# Patient Record
Sex: FEMALE | Race: WHITE | NOT HISPANIC OR LATINO | Employment: OTHER | URBAN - METROPOLITAN AREA
[De-identification: names, ages, dates, MRNs, and addresses within clinical notes are randomized per-mention and may not be internally consistent; named-entity substitution may affect disease eponyms.]

---

## 2017-01-04 DIAGNOSIS — E78.5 HYPERLIPIDEMIA WITH TARGET LDL LESS THAN 130: Primary | ICD-10-CM

## 2017-01-04 RX ORDER — SIMVASTATIN 10 MG
10 TABLET ORAL AT BEDTIME
Qty: 90 TABLET | Refills: 3 | Status: SHIPPED | OUTPATIENT
Start: 2017-01-04 | End: 2017-12-19

## 2017-01-04 NOTE — TELEPHONE ENCOUNTER
Last lipids over 1 year ago.  Naima refill #30 given last month and letter mailed to patient.    No appointment pending at this time.  Routing to provider to advise.    Alexa Crawford RN

## 2017-01-10 DIAGNOSIS — I10 ESSENTIAL HYPERTENSION WITH GOAL BLOOD PRESSURE LESS THAN 140/90: Primary | ICD-10-CM

## 2017-01-10 RX ORDER — LOSARTAN POTASSIUM AND HYDROCHLOROTHIAZIDE 25; 100 MG/1; MG/1
1 TABLET ORAL DAILY
Qty: 30 TABLET | Refills: 0 | Status: SHIPPED | OUTPATIENT
Start: 2017-01-10 | End: 2017-01-24

## 2017-01-10 NOTE — Clinical Note
[facility]                                             [address] [place]  [address]    January 10, 2017    Lindsay Yuen  [address]  [facility] [address]    Dear Lindsay,       We recently received a refill request for losartan-hydrochlorothiazide .  We have refilled this for a one time 30 day supply only because you are due for a:    Blood pressure office visit      Please call at your earliest convenience so that there will not be a delay with your future refills.          Thank you,   Your [facility] Care Team/jannet  [phone]

## 2017-01-10 NOTE — TELEPHONE ENCOUNTER
Rx refilled per [place] RN refill protocol, #30 only.    TC, patient overdue for:  OV for BP    Alexa Crawford RN

## 2017-01-10 NOTE — TELEPHONE ENCOUNTER
LOSARTAN POTASSIUM-HCTZ      Last Written Prescription Date: 10-9-16  Last Fill Quantity: 90, # refills: 1  Last Office Visit with G, [facility] or [facility] prescribing provider: 6-21-16       POTASSIUM   Date Value Ref Range Status   12/28/2016 3.9 3.4 - 5.3 mmol/L Final     CREATININE   Date Value Ref Range Status   12/28/2016 0.58 0.52 - 1.04 mg/dL Final     BP Readings from Last 3 Encounters:   06/21/16 139/72   02/25/16 132/65   09/30/15 136/84

## 2017-01-18 NOTE — PROGRESS NOTES
SUBJECTIVE:                                                    Lindsay Yuen is a 61 year old female who presents to clinic today for the following health issues:      Hyperlipidemia Follow-Up      Rate your low fat/cholesterol diet?: good    Taking statin?  Yes, no muscle aches from statin    Other lipid medications/supplements?:  none     Hypertension Follow-up      Outpatient blood pressures are being checked at work.  Results are 130/70's.    Low Salt Diet: no added salt  Hypertension ROS: taking medications as instructed, no medication side effects noted, no TIA's, no chest pain on exertion, no dyspnea on exertion, no swelling of ankles.  No headache or visual changes.        Amount of exercise or physical activity: 5 days a work     Problems taking medications regularly: No    Medication side effects: none    Diet: low salt and low fat/cholesterol    Pt has stopped eating out since 1 Jan 2017 and has already lost 5 pounds.  Starting to be more active as well.  Spouse is joining her in her efforts.  Daughter getting  this spring in [place] - so more motivation to lose wt.      Patient Active Problem List   Diagnosis     Hyperlipidemia with target LDL less than 130     Cervical polyp     Hypertension goal BP (blood pressure) < 150/90     Advanced directives, counseling/discussion     Morbid obesity due to excess calories (H)     Past Surgical History   Procedure Laterality Date     Cholecystectomy, laporoscopic               Social History   Substance Use Topics     Smoking status: Never Smoker      Smokeless tobacco: Never Used      Comment: no smokers in the [facility]     Alcohol Use: Yes      Comment: occasional     Family History   Problem Relation Age of Onset     DIABETES Paternal Grandfather      adult     Breast Cancer Sister      CANCER Sister      HEART DISEASE Maternal Aunt      Breast Cancer Mother      Hypertension Mother      Lipids Mother      CANCER Father      kidney cancer     CANCER  Sister      pancreatic         Current Outpatient Prescriptions   Medication Sig Dispense Refill     losartan-hydrochlorothiazide (HYZAAR) 100-25 MG per tablet Take 1 tablet by mouth daily 90 tablet 1     simvastatin (ZOCOR) 10 MG tablet Take 1 tablet (10 mg) by mouth At Bedtime 90 tablet 3     aspirin 81 MG tablet Take 1 tablet by mouth daily.       FOLIC ACID 1 MG OR TABS ONE DAILY       CALCIUM 600 + D 600-200 MG-UNIT OR TABS 2 pill a day  3     CENTRUM SILVER OR TABS ONE DAILY 0 0     VITAMIN E 400 UNIT OR CAPS ONE CAPSULE DAILY       [DISCONTINUED] losartan-hydrochlorothiazide (HYZAAR) 100-25 MG per tablet Take 1 tablet by mouth daily 30 tablet 0     BP Readings from Last 3 Encounters:   01/24/17 138/68   06/21/16 139/72   02/25/16 132/65    Wt Readings from Last 3 Encounters:   01/24/17 220 lb (99.791 kg)   06/21/16 218 lb (98.884 kg)   02/25/16 219 lb (99.338 kg)                  Labs reviewed in EPIC  Problem list, Medication list, Allergies, and Medical/Social/Surgical histories reviewed in [facility] and updated as appropriate.    ROS:  Constitutional, HEENT, cardiovascular, pulmonary, gi and gu systems are negative, except as otherwise noted.    OBJECTIVE:                                                    /68 mmHg  Pulse 79  Temp(Src) 98.2  F (36.8  C) (Oral)  Wt 220 lb (99.791 kg)  SpO2 97%  Body mass index is 37.74 kg/(m^2).  GENERAL: healthy, alert and no distress  EYES: Eyes grossly normal to inspection, PERRL and conjunctivae and sclerae normal  NECK: no adenopathy, no asymmetry, masses, or scars and thyroid normal to palpation  RESP: lungs clear to auscultation - no rales, rhonchi or wheezes  CV: regular rate and rhythm, normal S1 S2, no S3 or S4, no murmur, click or rub, no peripheral edema and peripheral pulses strong  MS: no gross musculoskeletal defects noted, no edema  SKIN: no suspicious lesions or rashes  PSYCH: mentation appears normal, affect normal/bright    Diagnostic Test  Results:  none      ASSESSMENT/PLAN:                                                    (I10) Essential hypertension with goal blood pressure less than 140/90  (primary encounter diagnosis)  Comment: to goal  Plan: losartan-hydrochlorothiazide (HYZAAR) 100-25 MG        per tablet         Refill x 6 months f/u 6 months for AFEand labs     (E66.01) Morbid obesity due to excess calories (H)  Comment: losing weight with changes in nutrition and activity  Plan: continue efforts, encouraged pt to keep up the good work    (Z71.89) Advanced directives, counseling/discussion  Comment: due  Plan: info given    See Patient Instructions    Colleen Marroquin MD  [facility]

## 2017-01-24 ENCOUNTER — OFFICE VISIT (OUTPATIENT)
Dept: FAMILY MEDICINE | Facility: CLINIC | Age: 62
End: 2017-01-24
Payer: COMMERCIAL

## 2017-01-24 VITALS
WEIGHT: 220 LBS | HEART RATE: 79 BPM | DIASTOLIC BLOOD PRESSURE: 68 MMHG | OXYGEN SATURATION: 97 % | TEMPERATURE: 98.2 F | BODY MASS INDEX: 37.74 KG/M2 | SYSTOLIC BLOOD PRESSURE: 138 MMHG

## 2017-01-24 DIAGNOSIS — E66.01 MORBID OBESITY DUE TO EXCESS CALORIES (H): ICD-10-CM

## 2017-01-24 DIAGNOSIS — I10 ESSENTIAL HYPERTENSION WITH GOAL BLOOD PRESSURE LESS THAN 140/90: Primary | ICD-10-CM

## 2017-01-24 DIAGNOSIS — Z71.89 ADVANCED DIRECTIVES, COUNSELING/DISCUSSION: ICD-10-CM

## 2017-01-24 PROCEDURE — 99213 OFFICE O/P EST LOW 20 MIN: CPT | Performed by: FAMILY MEDICINE

## 2017-01-24 RX ORDER — LOSARTAN POTASSIUM AND HYDROCHLOROTHIAZIDE 25; 100 MG/1; MG/1
1 TABLET ORAL DAILY
Qty: 90 TABLET | Refills: 1 | Status: SHIPPED | OUTPATIENT
Start: 2017-01-24 | End: 2017-07-28

## 2017-01-24 NOTE — NURSING NOTE
"Chief Complaint   Patient presents with     Hypertension     Lipids       Initial /68 mmHg  Pulse 79  Temp(Src) 98.2  F (36.8  C) (Oral)  Wt 220 lb (99.791 kg)  SpO2 97% Estimated body mass index is 37.74 kg/(m^2) as calculated from the following:    Height as of 6/21/16: 5' 4\" (1.626 m).    Weight as of this encounter: 220 lb (99.791 kg).  BP completed using cuff size: nahum Escobar CMA      "

## 2017-01-24 NOTE — MR AVS SNAPSHOT
"              After Visit Summary   1/24/2017    Lindsay Yuen    MRN: [id number]           Patient Information     Date Of Birth          1955        Visit Information        Provider Department      1/24/2017 10:15 AM Colleen Marroquin MD [facility]        Today's Diagnoses     Essential hypertension with goal blood pressure less than 140/90    -  1     Advanced directives, counseling/discussion           Care Instructions    A Team member will come into the exam room to help you schedule a:    Laboratory appointment  (non-fasting) in 6 month(s) - this is been scheduled for ___-___-___ at ____ am/pm   Follow up visit with Colleen Marroquin MD for physical in 6 month(s) - this is been scheduled for ___-___-___ at ____ am/pm     ----------------------------------------------------------------------------------------------------------------------------------------     ----------------------------------------------------------------------------------------------------------------------------------------    If you have problems after leaving the clinic please call [facility] at [phone] or contact us via \"Image Socket\" using the care team option      [facility]    If you have any questions regarding to your visit please contact:       Team Saints:   Clinic Hours Telephone Number   Dr. Jill Robinson Dr. William Sypura Kristen Kehr, [place] 7am-7pm  Monday - Thursday   7am-5pm  Fridays  [phone] fax    Denise/RN     After Hours Nurse Advise and Appts.   [facility] Nurse Advisors: [phone]  Matti On Call: to make appointments anytime - [phone]    Urgent Care -   [place] 12pm-8pm Monday-Friday  9am-5pm Saturday-Sunday    9am-5pm Saturday-Sunday [phone] - [place]     [phone] - [place]       After hours, weekend or if you need to make an appointment with your primary provider please call [phone].     If you need a " "medication refill please contact your pharmacy.   Please allow 3 business days for your refills to be completed.    Use [employer] (secure email communication and access to your chart) to send your primary care provider a message or make an appointment. Ask someone on your Team how to sign up for [employer].  [employer] Call now has an chiquita for your smart phone.         Follow-ups after your visit        Who to contact     If you have questions or need follow up information about today's clinic visit or your schedule please contact [facility] directly at [phone].  Normal or non-critical lab and imaging results will be communicated to you by MyChart, letter or phone within 4 business days after the clinic has received the results. If you do not hear from us within 7 days, please contact the clinic through [employer] Safety[employer] or phone. If you have a critical or abnormal lab result, we will notify you by phone as soon as possible.  Submit refill requests through [employer] Call or call your pharmacy and they will forward the refill request to us. Please allow 3 business days for your refill to be completed.          Additional Information About Your Visit        StreamLine Call Information     Stream[employer] Call lets you send messages to your doctor, view your test results, renew your prescriptions, schedule appointments and more. To sign up, go to [email or web address] . Click on \"Log in\" on the left side of the screen, which will take you to the Welcome page. Then click on \"Sign up Now\" on the right side of the page.     You will be asked to enter the access code listed below, as well as some personal information. Please follow the directions to create your username and password.     Your access code is: [email or web address]  Expires: 2017 10:31 AM     Your access code will  in 90 days. If you need help or a new code, please call your [facility] or [phone].        Care EveryWhere ID     This is your Care EveryWhere ID. This could be " used by other organizations to access your [facility] medical records  [id number]        Your Vitals Were     Pulse Temperature Pulse Oximetry             79 98.2  F (36.8  C) (Oral) 97%          Blood Pressure from Last 3 Encounters:   01/24/17 138/68   06/21/16 139/72   02/25/16 132/65    Weight from Last 3 Encounters:   01/24/17 220 lb (99.791 kg)   06/21/16 218 lb (98.884 kg)   02/25/16 219 lb (99.338 kg)              Today, you had the following     No orders found for display         Where to get your medicines      These medications were sent to [id number] - [place], [place] - [address]  [address] [place] [address]     Phone:  [phone]    - losartan-hydrochlorothiazide 100-25 MG per tablet       Primary Care Provider Office Phone # Fax #    Colleen Marroquin [phone]       [facility] [address] [address] [address]        Thank you!     Thank you for choosing [facility]  for your care. Our goal is always to provide you with excellent care. Hearing back from our patients is one way we can continue to improve our services. Please take a few minutes to complete the written survey that you may receive in the mail after your visit with us. Thank you!             Your Updated Medication List - Protect others around you: Learn how to safely use, store and throw away your medicines at www.disposemymeds.org.          This list is accurate as of: 1/24/17 10:31 AM.  Always use your most recent med list.                   Brand Name Dispense Instructions for use    aspirin 81 MG tablet      Take 1 tablet by mouth daily.       CALCIUM 600 + D 600-200 MG-UNIT Tabs          2 pill a day       CENTRUM SILVER per tablet     0    ONE DAILY       folic acid 1 MG tablet    FOLVITE         ONE DAILY       losartan-hydrochlorothiazide 100-25 MG per tablet    HYZAAR    90 tablet    Take 1 tablet by mouth daily       simvastatin 10 MG  tablet    ZOCOR    90 tablet    Take 1 tablet (10 mg) by mouth At Bedtime       vitamin E 400 UNIT capsule          ONE CAPSULE DAILY

## 2017-01-24 NOTE — PATIENT INSTRUCTIONS
"A Team member will come into the exam room to help you schedule a:    Laboratory appointment  (non-fasting) in 6 month(s) - this is been scheduled for ___-___-___ at ____ am/pm   Follow up visit with Colleen Marroquin MD for physical in 6 month(s) - this is been scheduled for ___-___-___ at ____ am/pm     ----------------------------------------------------------------------------------------------------------------------------------------     ----------------------------------------------------------------------------------------------------------------------------------------    If you have problems after leaving the clinic please call [facility] at [phone] or contact us via \"[employer]\" using the care team option      [facility]    If you have any questions regarding to your visit please contact:       Team Saints:   Clinic Hours Telephone Number   Dr. Jill Robinson Dr. William Sypura Kristen Kehr, [place] 7am-7pm  Monday - Thursday   7am-5pm  Fridays  [phone] fax    Denise/BITA     After Hours Nurse Advise and Appts.   [facility]atti Nurse Advisors: [phone]  Matti On Call: to make appointments anytime - [phone]    Urgent Care -   [place]enee Vigil    [place] 12pm-8pm Monday-Friday  9am-5pm Saturday-Sunday    9am-5pm Saturday-Sunday [phone] - [place]     [phone] - [place]       After hours, weekend or if you need to make an appointment with your primary provider please call [phone].     If you need a medication refill please contact your pharmacy.   Please allow 3 business days for your refills to be completed.    Use [employer] (secure email communication and access to your chart) to send your primary care provider a message or make an appointment. Ask someone on your Team how to sign up for [employer].  [employer] now has an chiquita for your smart phone.   "

## 2017-04-17 ENCOUNTER — TELEPHONE (OUTPATIENT)
Dept: FAMILY MEDICINE | Facility: CLINIC | Age: 62
End: 2017-04-17

## 2017-04-17 NOTE — TELEPHONE ENCOUNTER
Medication has never been addressed.   Please advise patient will need to do an   Evisit.  May need to contact    [employer] help desk number, [phone]  I can then send the questionnaire to her [employer]t.  Denise Norris RN

## 2017-04-17 NOTE — TELEPHONE ENCOUNTER
She wants flight anxiety medication (in case needed) for a flight she'll be taking 4/26.  Would like medication by end of this week.

## 2017-04-19 NOTE — TELEPHONE ENCOUNTER
Notified patient of message below and gave # to call to reset password. Patient states understanding and will send e-visit./Marilyn Garcia,

## 2017-06-17 ENCOUNTER — OFFICE VISIT (OUTPATIENT)
Dept: URGENT CARE | Facility: URGENT CARE | Age: 62
End: 2017-06-17
Payer: COMMERCIAL

## 2017-06-17 VITALS
SYSTOLIC BLOOD PRESSURE: 146 MMHG | DIASTOLIC BLOOD PRESSURE: 78 MMHG | BODY MASS INDEX: 37.42 KG/M2 | WEIGHT: 218 LBS | OXYGEN SATURATION: 95 % | TEMPERATURE: 98.8 F | HEART RATE: 85 BPM

## 2017-06-17 DIAGNOSIS — J02.9 ACUTE PHARYNGITIS, UNSPECIFIED ETIOLOGY: ICD-10-CM

## 2017-06-17 DIAGNOSIS — J02.9 SORE THROAT: Primary | ICD-10-CM

## 2017-06-17 LAB
DEPRECATED S PYO AG THROAT QL EIA: NORMAL
MICRO REPORT STATUS: NORMAL
SPECIMEN SOURCE: NORMAL

## 2017-06-17 PROCEDURE — 87880 STREP A ASSAY W/OPTIC: CPT | Performed by: FAMILY MEDICINE

## 2017-06-17 PROCEDURE — 87081 CULTURE SCREEN ONLY: CPT | Performed by: FAMILY MEDICINE

## 2017-06-17 PROCEDURE — 99213 OFFICE O/P EST LOW 20 MIN: CPT | Performed by: FAMILY MEDICINE

## 2017-06-17 NOTE — PATIENT INSTRUCTIONS
Viral Pharyngitis (Sore Throat)    You (or your child, if your child is the patient) have pharyngitis (sore throat). This infection is caused by a virus. It can cause throat pain that is worse when swallowing, aching all over, headache, and fever. The infection may be spread by coughing, kissing, or touching others after touching your mouth or nose. Antibiotic medications do not work against viruses, so they are not used for treating this condition.  Home care    If your symptoms are severe, rest at home. Return to work or school when you feel well enough.     Drink plenty of fluids to avoid dehydration.    For children: Use acetaminophen for fever, fussiness or discomfort. In infants over six months of age, you may use ibuprofen instead of acetaminophen. (NOTE: If your child has chronic liver or kidney disease or ever had a stomach ulcer or GI bleeding, talk with your doctor before using these medicines.) (NOTE: Aspirin should never be used in anyone under 18 years of age who is ill with a fever. It may cause severe liver damage.)     For adults: You may use acetaminophen or ibuprofen to control pain or fever, unless another medicine was prescribed for this. (NOTE: If you have chronic liver or kidney disease or ever had a stomach ulcer or GI bleeding, talk with your doctor before using these medicines.)    Throat lozenges or numbing throat sprays can help reduce pain. Gargling with warm salt water will also help reduce throat pain. For this, dissolve 1/2 teaspoon of salt in 1 glass of warm water. To help soothe a sore throat, children can sip on juice or a popsicle. Children 5 years and older can also suck on a lollipop or hard candy.    Avoid salty or spicy foods, which can be irritating to the throat.  Follow-up care  Follow up with your healthcare provider or our staff if you are not improving over the next week.  When to seek medical advice  Call your healthcare provider right away if any of these  occur:    Fever as directed by your doctor.  For children, seek care if:    Your child is of any age and has repeated fevers above 104 F (40 C).    Your child is younger than 2 years of age and has a fever of 100.4 F (38 C) that continues for more than 1 day.    Your child is 2 years old or older and has a fever of 100.4 F (38 C) that continues for more than 3 days.    New or worsening ear pain, sinus pain, or headache    Painful lumps in the back of neck    Stiff neck    Lymph nodes are getting larger    Inability to swallow liquids, excessive drooling, or inability to open mouth wide due to throat pain    Signs of dehydration (very dark urine or no urine, sunken eyes, dizziness)    Trouble breathing or noisy breathing    Muffled voice    New rash    Child appears to be getting sicker  Date Last Reviewed: 4/13/2015 2000-2017 [employer]. [address] [place] [address], [place] [address]. All rights reserved. This information is not intended as a substitute for professional medical care. Always follow your healthcare professional's instructions.

## 2017-06-17 NOTE — PROGRESS NOTES
SUBJECTIVE:                                                    Lindsay Yuen is a 62 year old female who presents to clinic today for the following health issues:      RESPIRATORY SYMPTOMS      Duration: this am     Description  Sore throat, feels knot in throat     Severity: moderate    Accompanying signs and symptoms: None    History (predisposing factors):  none    Precipitating or alleviating factors: started teaching     Therapies tried and outcome:  none         Problem list and histories reviewed & adjusted, as indicated.  Additional history: as documented    Patient Active Problem List   Diagnosis     Hyperlipidemia with target LDL less than 130     Cervical polyp     Hypertension goal BP (blood pressure) < 150/90     Advanced directives, counseling/discussion     Morbid obesity due to excess calories (H)     Past Surgical History:   Procedure Laterality Date     CHOLECYSTECTOMY, LAPOROSCOPIC              Social History   Substance Use Topics     Smoking status: Never Smoker     Smokeless tobacco: Never Used      Comment: no smokers in the [facility]     Alcohol use Yes      Comment: occasional     Family History   Problem Relation Age of Onset     DIABETES Paternal Grandfather      adult     Breast Cancer Sister      CANCER Sister      HEART DISEASE Maternal Aunt      Breast Cancer Mother      Hypertension Mother      Lipids Mother      CANCER Father      kidney cancer     CANCER Sister      pancreatic         Current Outpatient Prescriptions   Medication Sig Dispense Refill     losartan-hydrochlorothiazide (HYZAAR) 100-25 MG per tablet Take 1 tablet by mouth daily 90 tablet 1     simvastatin (ZOCOR) 10 MG tablet Take 1 tablet (10 mg) by mouth At Bedtime 90 tablet 3     aspirin 81 MG tablet Take 1 tablet by mouth daily.       FOLIC ACID 1 MG OR TABS ONE DAILY       CALCIUM 600 + D 600-200 MG-UNIT OR TABS 2 pill a day  3     CENTRUM SILVER OR TABS ONE DAILY 0 0     VITAMIN E 400 UNIT OR CAPS ONE  CAPSULE DAILY       Allergies   Allergen Reactions     No Known Drug Allergies      Recent Labs   Lab Test  12/28/16   0831  06/21/16   1602  09/16/15   1119  11/10/14   1033   02/14/11   0942  09/16/10   0732   LDL  94   --   79  85   < >  91  109   HDL  66   --   62  54   < >  60  49*   TRIG  141   --   79  158*   < >  88  108   ALT   --    --   36   --    --   26  28   CR  0.58  0.62  0.59  0.57   < >  0.54   --    GFRESTIMATED  >90  Non  GFR Calc    >90  Non  GFR Calc    >90  Non  GFR Calc    >90  Non  GFR Calc     < >  >90   --    GFRESTBLACK  >90   GFR Calc    >90   GFR Calc    >90   GFR Calc    >90   GFR Calc     < >  >90   --    POTASSIUM  3.9  3.8  3.9  3.7   < >  4.1   --     < > = values in this interval not displayed.      BP Readings from Last 3 Encounters:   06/17/17 146/78   01/24/17 138/68   06/21/16 139/72    Wt Readings from Last 3 Encounters:   06/17/17 218 lb (98.9 kg)   01/24/17 220 lb (99.8 kg)   06/21/16 218 lb (98.9 kg)                  Labs reviewed in EPIC    ROS:  Constitutional, HEENT, cardiovascular, pulmonary, gi and gu systems are negative, except as otherwise noted.    OBJECTIVE:                                                    /78  Pulse 85  Temp 98.8  F (37.1  C) (Tympanic)  Wt 218 lb (98.9 kg)  SpO2 95%  BMI 37.42 kg/m2  Body mass index is 37.42 kg/(m^2).  GENERAL: healthy, alert and no distress  EYES: Eyes grossly normal to inspection, PERRL and conjunctivae and sclerae normal  HENT: normal cephalic/atraumatic, ear canals and TM's normal, nose and mouth without ulcers or lesions, oral mucous membranes moist and oropharxnx crowded  NECK: no adenopathy, no asymmetry, masses, or scars and thyroid normal to palpation  RESP: lungs clear to auscultation - no rales, rhonchi or wheezes  CV: regular rates and rhythm, normal S1 S2, no S3 or S4 and no  murmur, click or rub  MS: no gross musculoskeletal defects noted, no edema    Diagnostic Test Results:  Results for orders placed or performed in visit on 06/17/17 (from the past 24 hour(s))   Rapid strep screen   Result Value Ref Range    Specimen Description Throat     Rapid Strep A Screen       NEGATIVE: No Group A streptococcal antigen detected by immunoassay, await   culture report.      Micro Report Status FINAL 06/17/2017         ASSESSMENT/PLAN:                                                          ICD-10-CM    1. Sore throat J02.9 Rapid strep screen     Beta strep group A culture   2. Acute pharyngitis, unspecified etiology J02.9      Discussed in detail differentials and further management. Symptoms are likely secondary to viral infection. Recommended well hydration, over-the-counter analgesia, warm fluids and saline gargles. Written instructions/information provided. Patient understood and in agreement with the above plan. All questions are answered. Follow-up if symptoms persist or worsen.        Patient Instructions     Viral Pharyngitis (Sore Throat)    You (or your child, if your child is the patient) have pharyngitis (sore throat). This infection is caused by a virus. It can cause throat pain that is worse when swallowing, aching all over, headache, and fever. The infection may be spread by coughing, kissing, or touching others after touching your mouth or nose. Antibiotic medications do not work against viruses, so they are not used for treating this condition.  Home care    If your symptoms are severe, rest at home. Return to work or school when you feel well enough.     Drink plenty of fluids to avoid dehydration.    For children: Use acetaminophen for fever, fussiness or discomfort. In infants over six months of age, you may use ibuprofen instead of acetaminophen. (NOTE: If your child has chronic liver or kidney disease or ever had a stomach ulcer or GI bleeding, talk with your doctor before  using these medicines.) (NOTE: Aspirin should never be used in anyone under 18 years of age who is ill with a fever. It may cause severe liver damage.)     For adults: You may use acetaminophen or ibuprofen to control pain or fever, unless another medicine was prescribed for this. (NOTE: If you have chronic liver or kidney disease or ever had a stomach ulcer or GI bleeding, talk with your doctor before using these medicines.)    Throat lozenges or numbing throat sprays can help reduce pain. Gargling with warm salt water will also help reduce throat pain. For this, dissolve 1/2 teaspoon of salt in 1 glass of warm water. To help soothe a sore throat, children can sip on juice or a popsicle. Children 5 years and older can also suck on a lollipop or hard candy.    Avoid salty or spicy foods, which can be irritating to the throat.  Follow-up care  Follow up with your healthcare provider or our staff if you are not improving over the next week.  When to seek medical advice  Call your healthcare provider right away if any of these occur:    Fever as directed by your doctor.  For children, seek care if:    Your child is of any age and has repeated fevers above 104 F (40 C).    Your child is younger than 2 years of age and has a fever of 100.4 F (38 C) that continues for more than 1 day.    Your child is 2 years old or older and has a fever of 100.4 F (38 C) that continues for more than 3 days.    New or worsening ear pain, sinus pain, or headache    Painful lumps in the back of neck    Stiff neck    Lymph nodes are getting larger    Inability to swallow liquids, excessive drooling, or inability to open mouth wide due to throat pain    Signs of dehydration (very dark urine or no urine, sunken eyes, dizziness)    Trouble breathing or noisy breathing    Muffled voice    New rash    Child appears to be getting sicker  Date Last Reviewed: 4/13/2015 2000-2017 [employer]. [address] [place] [address], [place]  [address]. All rights reserved. This information is not intended as a substitute for professional medical care. Always follow your healthcare professional's instructions.            Kevin Wharton MD  [facility]

## 2017-06-17 NOTE — NURSING NOTE
"Chief Complaint   Patient presents with     Sick       Initial /78  Pulse 85  Temp 98.8  F (37.1  C) (Tympanic)  Wt 218 lb (98.9 kg)  SpO2 95%  BMI 37.42 kg/m2 Estimated body mass index is 37.42 kg/(m^2) as calculated from the following:    Height as of 6/21/16: 5' 4\" (1.626 m).    Weight as of this encounter: 218 lb (98.9 kg).  Medication Reconciliation: complete     MARYCHUY Feliciano      "

## 2017-06-17 NOTE — MR AVS SNAPSHOT
After Visit Summary   6/17/2017    Lindsay Yuen    MRN: [id number]           Patient Information     Date Of Birth          1955        Visit Information        Provider Department      6/17/2017 2:00 PM Kevin Wharton MD [facility]        Today's Diagnoses     Sore throat    -  1    Acute pharyngitis, unspecified etiology          Care Instructions      Viral Pharyngitis (Sore Throat)    You (or your child, if your child is the patient) have pharyngitis (sore throat). This infection is caused by a virus. It can cause throat pain that is worse when swallowing, aching all over, headache, and fever. The infection may be spread by coughing, kissing, or touching others after touching your mouth or nose. Antibiotic medications do not work against viruses, so they are not used for treating this condition.  Home care    If your symptoms are severe, rest at home. Return to work or school when you feel well enough.     Drink plenty of fluids to avoid dehydration.    For children: Use acetaminophen for fever, fussiness or discomfort. In infants over six months of age, you may use ibuprofen instead of acetaminophen. (NOTE: If your child has chronic liver or kidney disease or ever had a stomach ulcer or GI bleeding, talk with your doctor before using these medicines.) (NOTE: Aspirin should never be used in anyone under 18 years of age who is ill with a fever. It may cause severe liver damage.)     For adults: You may use acetaminophen or ibuprofen to control pain or fever, unless another medicine was prescribed for this. (NOTE: If you have chronic liver or kidney disease or ever had a stomach ulcer or GI bleeding, talk with your doctor before using these medicines.)    Throat lozenges or numbing throat sprays can help reduce pain. Gargling with warm salt water will also help reduce throat pain. For this, dissolve 1/2 teaspoon of salt in 1 glass of warm water. To help soothe a sore throat,  children can sip on juice or a popsicle. Children 5 years and older can also suck on a lollipop or hard candy.    Avoid salty or spicy foods, which can be irritating to the throat.  Follow-up care  Follow up with your healthcare provider or our staff if you are not improving over the next week.  When to seek medical advice  Call your healthcare provider right away if any of these occur:    Fever as directed by your doctor.  For children, seek care if:    Your child is of any age and has repeated fevers above 104 F (40 C).    Your child is younger than 2 years of age and has a fever of 100.4 F (38 C) that continues for more than 1 day.    Your child is 2 years old or older and has a fever of 100.4 F (38 C) that continues for more than 3 days.    New or worsening ear pain, sinus pain, or headache    Painful lumps in the back of neck    Stiff neck    Lymph nodes are getting larger    Inability to swallow liquids, excessive drooling, or inability to open mouth wide due to throat pain    Signs of dehydration (very dark urine or no urine, sunken eyes, dizziness)    Trouble breathing or noisy breathing    Muffled voice    New rash    Child appears to be getting sicker  Date Last Reviewed: 4/13/2015 2000-2017 [employer]. [address] [place] [address], [place] [address]. All rights reserved. This information is not intended as a substitute for professional medical care. Always follow your healthcare professional's instructions.                Follow-ups after your visit        Who to contact     If you have questions or need follow up information about today's clinic visit or your schedule please contact [facility] directly at [phone].  Normal or non-critical lab and imaging results will be communicated to you by MyChart, letter or phone within 4 business days after the clinic has received the results. If you do not hear from us within 7 days, please contact the clinic through MyChart or phone.  If you have a critical or abnormal lab result, we will notify you by phone as soon as possible.  Submit refill requests through "[employer] or call your pharmacy and they will forward the refill request to us. Please allow 3 business days for your refill to be completed.          Additional Information About Your Visit        CMEhart Information     "[employer] gives you secure access to your electronic health record. If you see a primary care provider, you can also send messages to your care team and make appointments. If you have questions, please call your primary care clinic.  If you do not have a primary care provider, please call [phone] and they will assist you.        Care EveryWhere ID     This is your Care EveryWhere ID. This could be used by other organizations to access your [facility] medical records  [id number]        Your Vitals Were     Pulse Temperature Pulse Oximetry BMI (Body Mass Index)          85 98.8  F (37.1  C) (Tympanic) 95% 37.42 kg/m2         Blood Pressure from Last 3 Encounters:   06/17/17 146/78   01/24/17 138/68   06/21/16 139/72    Weight from Last 3 Encounters:   06/17/17 218 lb (98.9 kg)   01/24/17 220 lb (99.8 kg)   06/21/16 218 lb (98.9 kg)              We Performed the Following     Beta strep group A culture     Rapid strep screen        Primary Care Provider Office Phone # Fax #    Colleen Haylie Marroquin [phone]       [facility] [address] [facility] [address]        Thank you!     Thank you for choosing [facility]  for your care. Our goal is always to provide you with excellent care. Hearing back from our patients is one way we can continue to improve our services. Please take a few minutes to complete the written survey that you may receive in the mail after your visit with us. Thank you!             Your Updated Medication List - Protect others around you: Learn how to safely use, store and throw away your medicines at  www.disposemymeds.org.          This list is accurate as of: 6/17/17  2:24 PM.  Always use your most recent med list.                   Brand Name Dispense Instructions for use    aspirin 81 MG tablet      Take 1 tablet by mouth daily.       CALCIUM 600 + D 600-200 MG-UNIT Tabs          2 pill a day       CENTRUM SILVER per tablet     0    ONE DAILY       folic acid 1 MG tablet    FOLVITE         ONE DAILY       losartan-hydrochlorothiazide 100-25 MG per tablet    HYZAAR    90 tablet    Take 1 tablet by mouth daily       simvastatin 10 MG tablet    ZOCOR    90 tablet    Take 1 tablet (10 mg) by mouth At Bedtime       vitamin E 400 UNIT capsule          ONE CAPSULE DAILY

## 2017-06-17 NOTE — LETTER
[facility]  [address] [address]        June 19, 2017    Lindsay Yuen  [address] [facility] [address]            Dear Lindsay,    Strep throat culture came back negative.  Below is a copy of the results.  It was a pleasure to see you at your last appointment.    If you have any questions or concerns, please call myself or my nurse at [phone].    Sincerely,    Kevin Wharton MD/ks    Results for orders placed or performed in visit on 06/17/17   Rapid strep screen   Result Value Ref Range    Specimen Description Throat     Rapid Strep A Screen       NEGATIVE: No Group A streptococcal antigen detected by immunoassay, await   culture report.      Micro Report Status FINAL 06/17/2017    Beta strep group A culture   Result Value Ref Range    Specimen Description Throat     Culture Micro No beta hemolytic Streptococcus Group A isolated     Micro Report Status FINAL 06/18/2017

## 2017-06-18 LAB
BACTERIA SPEC CULT: NORMAL
MICRO REPORT STATUS: NORMAL
SPECIMEN SOURCE: NORMAL

## 2017-07-28 DIAGNOSIS — I10 ESSENTIAL HYPERTENSION WITH GOAL BLOOD PRESSURE LESS THAN 140/90: ICD-10-CM

## 2017-07-30 RX ORDER — LOSARTAN POTASSIUM AND HYDROCHLOROTHIAZIDE 25; 100 MG/1; MG/1
TABLET ORAL
Qty: 90 TABLET | Refills: 0 | Status: SHIPPED | OUTPATIENT
Start: 2017-07-30 | End: 2017-11-14

## 2017-07-31 NOTE — TELEPHONE ENCOUNTER
Medication is being filled for 1 time refill only due to:  Needs annual exam.  Zoe Reynolds RN

## 2017-08-11 ENCOUNTER — RADIANT APPOINTMENT (OUTPATIENT)
Dept: MAMMOGRAPHY | Facility: CLINIC | Age: 62
End: 2017-08-11
Attending: FAMILY MEDICINE
Payer: COMMERCIAL

## 2017-08-11 DIAGNOSIS — Z12.31 VISIT FOR SCREENING MAMMOGRAM: ICD-10-CM

## 2017-08-11 PROCEDURE — G0202 SCR MAMMO BI INCL CAD: HCPCS | Mod: TC

## 2017-11-14 DIAGNOSIS — I10 ESSENTIAL HYPERTENSION WITH GOAL BLOOD PRESSURE LESS THAN 140/90: ICD-10-CM

## 2017-11-14 NOTE — LETTER
[facility]  [address] [facility] [id number] [phone]    November 15, 2017    Lindsay Yuen  [address]  [facility] [address]    Dear Lindsay,       We recently received a refill request for losartan-hydrochlorothiazide.  We have refilled this for a one time 30 day supply only because you are due for a:    Blood pressure office visit and fasting lab appointment      Please schedule this lab appointment 4-5 days prior to the office visit.     Please call at your earliest convenience so that there will not be a delay with your future refills.          Thank you,   Your [facility] Care Team/jannet  [phone]

## 2017-11-15 RX ORDER — LOSARTAN POTASSIUM AND HYDROCHLOROTHIAZIDE 25; 100 MG/1; MG/1
TABLET ORAL
Qty: 30 TABLET | Refills: 0 | Status: SHIPPED | OUTPATIENT
Start: 2017-11-15 | End: 2017-12-19

## 2017-11-15 NOTE — TELEPHONE ENCOUNTER
Medication is being filled for 1 time refill only due to:  Patient needs to be seen because due for fasting lab appt and ov.   Kelsie Dunne RN

## 2017-11-27 ENCOUNTER — DOCUMENTATION ONLY (OUTPATIENT)
Dept: LAB | Facility: CLINIC | Age: 62
End: 2017-11-27

## 2017-11-27 DIAGNOSIS — I10 HYPERTENSION GOAL BP (BLOOD PRESSURE) < 150/90: ICD-10-CM

## 2017-11-27 DIAGNOSIS — E78.5 HYPERLIPIDEMIA WITH TARGET LDL LESS THAN 130: Primary | ICD-10-CM

## 2017-11-27 NOTE — PROGRESS NOTES
Need previsit labs-see orders and close encounter if nothing else needed./Marilyn Garcia,       BP 12/19/17

## 2017-11-27 NOTE — PROGRESS NOTES
Please review, associate diagnosis and sign pending laboratory future orders for patient  upcoming lab appointment on 11/29/17.    Thank you,   Eneida Harris MLT

## 2017-11-29 DIAGNOSIS — I10 HYPERTENSION GOAL BP (BLOOD PRESSURE) < 150/90: ICD-10-CM

## 2017-11-29 DIAGNOSIS — E78.5 HYPERLIPIDEMIA WITH TARGET LDL LESS THAN 130: ICD-10-CM

## 2017-11-29 LAB
ANION GAP SERPL CALCULATED.3IONS-SCNC: 11 MMOL/L (ref 3–14)
BUN SERPL-MCNC: 22 MG/DL (ref 7–30)
CALCIUM SERPL-MCNC: 9.6 MG/DL (ref 8.5–10.1)
CHLORIDE SERPL-SCNC: 103 MMOL/L (ref 94–109)
CHOLEST SERPL-MCNC: 167 MG/DL
CO2 SERPL-SCNC: 26 MMOL/L (ref 20–32)
CREAT SERPL-MCNC: 0.64 MG/DL (ref 0.52–1.04)
GFR SERPL CREATININE-BSD FRML MDRD: >90 ML/MIN/1.7M2
GLUCOSE SERPL-MCNC: 89 MG/DL (ref 70–99)
HDLC SERPL-MCNC: 70 MG/DL
LDLC SERPL CALC-MCNC: 81 MG/DL
NONHDLC SERPL-MCNC: 97 MG/DL
POTASSIUM SERPL-SCNC: 3.8 MMOL/L (ref 3.4–5.3)
SODIUM SERPL-SCNC: 140 MMOL/L (ref 133–144)
TRIGL SERPL-MCNC: 79 MG/DL

## 2017-11-29 PROCEDURE — 36415 COLL VENOUS BLD VENIPUNCTURE: CPT | Performed by: FAMILY MEDICINE

## 2017-11-29 PROCEDURE — 80061 LIPID PANEL: CPT | Performed by: FAMILY MEDICINE

## 2017-11-29 PROCEDURE — 80048 BASIC METABOLIC PNL TOTAL CA: CPT | Performed by: FAMILY MEDICINE

## 2017-12-18 NOTE — PROGRESS NOTES
"  SUBJECTIVE:   Lindsay Yuen is a 62 year old female who presents to clinic today for the following health issues:        Hypertension Follow-up      Outpatient blood pressures {ISCHECKIN}    Low Salt Diet: { :897009::\"no added salt\"}        Amount of exercise or physical activity: {Exercise frequency days per week:540682}    Problems taking medications regularly: {Med Problems:118923::\"No\"}    Medication side effects: {CHRONIC MED SIDE EFFECTS:323168::\"none\"}    Diet: { :158590}        {additional problems for provider to add:732432}    Problem list and histories reviewed & adjusted, as indicated.  Additional history: {NONE - AS DOCUMENTED:571181::\"as documented\"}    {HIST REVIEW/ LINKS 2:272113}    Reviewed and updated as needed this visit by clinical staff     Reviewed and updated as needed this visit by Provider         {PROVIDER CHARTING PREFERENCE:339124}    "

## 2017-12-19 ENCOUNTER — OFFICE VISIT (OUTPATIENT)
Dept: FAMILY MEDICINE | Facility: CLINIC | Age: 62
End: 2017-12-19
Payer: COMMERCIAL

## 2017-12-19 VITALS
OXYGEN SATURATION: 98 % | HEIGHT: 64 IN | DIASTOLIC BLOOD PRESSURE: 85 MMHG | TEMPERATURE: 97.4 F | SYSTOLIC BLOOD PRESSURE: 147 MMHG | WEIGHT: 213 LBS | HEART RATE: 71 BPM | BODY MASS INDEX: 36.37 KG/M2

## 2017-12-19 DIAGNOSIS — E66.01 MORBID OBESITY DUE TO EXCESS CALORIES (H): ICD-10-CM

## 2017-12-19 DIAGNOSIS — E78.5 HYPERLIPIDEMIA WITH TARGET LDL LESS THAN 130: ICD-10-CM

## 2017-12-19 DIAGNOSIS — I10 HYPERTENSION GOAL BP (BLOOD PRESSURE) < 150/90: Primary | ICD-10-CM

## 2017-12-19 DIAGNOSIS — Z23 NEED FOR PROPHYLACTIC VACCINATION AND INOCULATION AGAINST INFLUENZA: ICD-10-CM

## 2017-12-19 PROCEDURE — 90686 IIV4 VACC NO PRSV 0.5 ML IM: CPT | Performed by: FAMILY MEDICINE

## 2017-12-19 PROCEDURE — 99214 OFFICE O/P EST MOD 30 MIN: CPT | Mod: 25 | Performed by: FAMILY MEDICINE

## 2017-12-19 PROCEDURE — 90471 IMMUNIZATION ADMIN: CPT | Performed by: FAMILY MEDICINE

## 2017-12-19 RX ORDER — SIMVASTATIN 10 MG
10 TABLET ORAL AT BEDTIME
Qty: 90 TABLET | Refills: 3 | Status: SHIPPED | OUTPATIENT
Start: 2017-12-19 | End: 2019-01-09

## 2017-12-19 RX ORDER — LOSARTAN POTASSIUM AND HYDROCHLOROTHIAZIDE 25; 100 MG/1; MG/1
TABLET ORAL
Qty: 90 TABLET | Refills: 1 | Status: SHIPPED | OUTPATIENT
Start: 2017-12-19 | End: 2018-06-20

## 2017-12-19 NOTE — NURSING NOTE
"Chief Complaint   Patient presents with     Hypertension       Initial /85  Pulse 71  Temp 97.4  F (36.3  C) (Oral)  Ht 5' 4\" (1.626 m)  Wt 213 lb (96.6 kg)  SpO2 98%  BMI 36.56 kg/m2 Estimated body mass index is 36.56 kg/(m^2) as calculated from the following:    Height as of this encounter: 5' 4\" (1.626 m).    Weight as of this encounter: 213 lb (96.6 kg).  Medication Reconciliation: complete  Debby Epperson M.A.    "

## 2017-12-19 NOTE — MR AVS SNAPSHOT
After Visit Summary   12/19/2017    Lindsay Yuen    MRN: [id number]           Patient Information     Date Of Birth          1955        Visit Information        Provider Department      12/19/2017 12:15 PM Colleen Marroquin MD [facility]        Today's Diagnoses     Hypertension goal BP (blood pressure) < 150/90    -  1    Morbid obesity due to excess calories (H)        Hyperlipidemia with target LDL less than 130        Essential hypertension with goal blood pressure less than 140/90        Need for prophylactic vaccination and inoculation against influenza           Follow-ups after your visit        Follow-up notes from your care team     Return in about 6 months (around 6/19/2018) for Physical Exam, Lab Work non-fasting.      Who to contact     If you have questions or need follow up information about today's clinic visit or your schedule please contact [facility] directly at [phone].  Normal or non-critical lab and imaging results will be communicated to you by MyChart, letter or phone within 4 business days after the clinic has received the results. If you do not hear from us within 7 days, please contact the clinic through Xebia[employer]art or phone. If you have a critical or abnormal lab result, we will notify you by phone as soon as possible.  Submit refill requests through [employer] or call your pharmacy and they will forward the refill request to us. Please allow 3 business days for your refill to be completed.          Additional Information About Your Visit        MyChart Information     [employer] gives you secure access to your electronic health record. If you see a primary care provider, you can also send messages to your care team and make appointments. If you have questions, please call your primary care clinic.  If you do not have a primary care provider, please call [phone] and they will assist you.        Care EveryWhere ID     This is your Care  "EveryWhere ID. This could be used by other organizations to access your [place] medical records  [id number]        Your Vitals Were     Pulse Temperature Height Pulse Oximetry BMI (Body Mass Index)       71 97.4  F (36.3  C) (Oral) 5' 4\" (1.626 m) 98% 36.56 kg/m2        Blood Pressure from Last 3 Encounters:   12/19/17 147/85   06/17/17 146/78   01/24/17 138/68    Weight from Last 3 Encounters:   12/19/17 213 lb (96.6 kg)   06/17/17 218 lb (98.9 kg)   01/24/17 220 lb (99.8 kg)              We Performed the Following     FLU VAC, SPLIT VIRUS IM > 3 YO (QUADRIVALENT) [79607]     Vaccine Administration, Initial [92807]          Today's Medication Changes          These changes are accurate as of: 12/19/17 12:47 PM.  If you have any questions, ask your nurse or doctor.               These medicines have changed or have updated prescriptions.        Dose/Directions    losartan-hydrochlorothiazide 100-25 MG per tablet   Commonly known as:  HYZAAR   This may have changed:  See the new instructions.   Used for:  Essential hypertension with goal blood pressure less than 140/90   Changed by:  Colleen Marroquin MD        TAKE 1 TABLET BY MOUTH ONCE DAILY. NEED APPT FOR REFILLS   Quantity:  90 tablet   Refills:  1            Where to get your medicines      These medications were sent to [id number] - [place], [place] - [address]  [address] [address] [address]     Phone:  [phone]     losartan-hydrochlorothiazide 100-25 MG per tablet    simvastatin 10 MG tablet                Primary Care Provider Office Phone # Fax #    Colleen Marroquin [phone] [address] [facility] [address]        Equal Access to Services     SOHAM ROMERO AH: Caren Godoy, peter goins, qaybta kaalmacharlene gallo, golden espinal. So [facility] [phone].    ATENCIÓN: Si habla español, tiene a spivey disposición servicios gratuitos de asistencia " lingüísticaRakesh Leal al [phone].    We comply with applicable federal civil rights laws and [place] laws. We do not discriminate on the basis of race, color, national origin, age, disability, sex, sexual orientation, or gender identity.            Thank you!     [facility] you for [facility]  [facility] your [facility]. Our goal is always to provide you with excellent care. Hearing back from our patients is one way we can continue to improve our services. Please take a few minutes to complete the written survey that you may receive in the mail after your visit with us. Thank you!             Your Updated Medication List - Protect others around you: Learn how to safely use, store and throw away your medicines at www.disposemymeds.org.          This list is accurate as of: 12/19/17 12:47 PM.  Always use your most recent med list.                   Brand Name Dispense Instructions for use Diagnosis    aspirin 81 MG tablet      Take 1 tablet by mouth daily.        CALCIUM 600 + D 600-200 MG-UNIT Tabs          2 pill a day        CENTRUM SILVER per tablet     0    ONE DAILY        folic acid 1 MG tablet    FOLVITE         ONE DAILY        losartan-hydrochlorothiazide 100-25 MG per tablet    HYZAAR    90 tablet    TAKE 1 TABLET BY MOUTH ONCE DAILY. NEED APPT FOR REFILLS    Essential hypertension with goal blood pressure less than 140/90       simvastatin 10 MG tablet    ZOCOR    90 tablet    Take 1 tablet (10 mg) by mouth At Bedtime    Hyperlipidemia with target LDL less than 130       vitamin E 400 UNIT capsule          ONE CAPSULE DAILY

## 2017-12-19 NOTE — PROGRESS NOTES
SUBJECTIVE:   Lindsay Yuen is a 62 year old female who presents to clinic today for the following health issues:    Hyperlipidemia Follow-Up      Rate your low fat/cholesterol diet?: good    Taking statin?  Yes, no muscle aches from statin    Other lipid medications/supplements?:  none    Hypertension Follow-up      Outpatient blood pressures are being checked at home.  Results are home.  Low Salt Diet: no added salt  Hypertension ROS: taking medications as instructed, no medication side effects noted, no TIA's, no chest pain on exertion, no dyspnea on exertion, no swelling of ankles.  No headache or visual changes.         Amount of exercise or physical activity: 2-3 days/week for an average of 15-30 minutes    Problems taking medications regularly: No    Medication side effects: none  Diet: diabetic  Stopped eating out and fats have dropped        Problem list and histories reviewed & adjusted, as indicated.  Additional history: as documented    Patient Active Problem List   Diagnosis     Hyperlipidemia with target LDL less than 130     Cervical polyp     Hypertension goal BP (blood pressure) < 150/90     Advanced directives, counseling/discussion     Morbid obesity due to excess calories (H)     Past Surgical History:   Procedure Laterality Date     CHOLECYSTECTOMY, LAPOROSCOPIC              Social History   Substance Use Topics     Smoking status: Never Smoker     Smokeless tobacco: Never Used      Comment: no smokers in the [facility]     Alcohol use Yes      Comment: occasional     Family History   Problem Relation Age of Onset     DIABETES Paternal Grandfather      adult     Breast Cancer Sister      CANCER Sister      HEART DISEASE Maternal Aunt      Breast Cancer Mother      Hypertension Mother      Lipids Mother      CANCER Father      kidney cancer     CANCER Sister      pancreatic         Current Outpatient Prescriptions   Medication Sig Dispense Refill     losartan-hydrochlorothiazide (HYZAAR) 100-25  "MG per tablet TAKE 1 TABLET BY MOUTH ONCE DAILY. NEED APPT FOR REFILLS 90 tablet 1     simvastatin (ZOCOR) 10 MG tablet Take 1 tablet (10 mg) by mouth At Bedtime 90 tablet 3     aspirin 81 MG tablet Take 1 tablet by mouth daily.       FOLIC ACID 1 MG OR TABS ONE DAILY       CALCIUM 600 + D 600-200 MG-UNIT OR TABS 2 pill a day  3     CENTRUM SILVER OR TABS ONE DAILY 0 0     VITAMIN E 400 UNIT OR CAPS ONE CAPSULE DAILY       [DISCONTINUED] losartan-hydrochlorothiazide (HYZAAR) 100-25 MG per tablet TAKE 1 TABLET BY MOUTH ONCE DAILY. NEED APPT FOR REFILLS 30 tablet 0     [DISCONTINUED] simvastatin (ZOCOR) 10 MG tablet Take 1 tablet (10 mg) by mouth At Bedtime 90 tablet 3     BP Readings from Last 3 Encounters:   12/19/17 147/85   06/17/17 146/78   01/24/17 138/68    Wt Readings from Last 3 Encounters:   12/19/17 213 lb (96.6 kg)   06/17/17 218 lb (98.9 kg)   01/24/17 220 lb (99.8 kg)                  Labs reviewed in EPIC        Reviewed and updated as needed this visit by clinical staffTobacco  Allergies  Meds  Problems  Med Hx  Surg Hx  Fam Hx  Soc Hx        Reviewed and updated as needed this visit by Provider  Allergies  Meds  Problems         ROS:  Constitutional, HEENT, cardiovascular, pulmonary, gi and gu systems are negative, except as otherwise noted.      OBJECTIVE:   /85  Pulse 71  Temp 97.4  F (36.3  C) (Oral)  Ht 5' 4\" (1.626 m)  Wt 213 lb (96.6 kg)  SpO2 98%  BMI 36.56 kg/m2  Body mass index is 36.56 kg/(m^2).  GENERAL: healthy, alert and no distress  EYES: Eyes grossly normal to inspection, PERRL and conjunctivae and sclerae normal  NECK: no adenopathy, no asymmetry, masses, or scars and thyroid normal to palpation  RESP: lungs clear to auscultation - no rales, rhonchi or wheezes  CV: regular rate and rhythm, normal S1 S2, no S3 or S4, no murmur, click or rub, no peripheral edema and peripheral pulses strong  MS: no gross musculoskeletal defects noted, no edema  SKIN: no suspicious " lesions or rashes  PSYCH: mentation appears normal, affect normal/bright    Diagnostic Test Results:  none     ASSESSMENT/PLAN:     (I10) Hypertension goal BP (blood pressure) < 150/90  (primary encounter diagnosis)  Comment: to goal  Plan: losartan-hydrochlorothiazide (HYZAAR) 100-25 MG        per tablet         Refill x 6 months f/u 6 months for OV and labs -afe    (E66.01) Morbid obesity due to excess calories (H)  Comment: gradual wt loss with increased activity and decreased intak  Plan: keep up plan    (E78.5) Hyperlipidemia with target LDL less than 130  Comment: to goal  Plan: simvastatin (ZOCOR) 10 MG tablet        Refill x 1 yr     (Z23) Need for prophylactic vaccination and inoculation against influenza  Comment: due  Plan: FLU VAC, SPLIT VIRUS IM > 3 YO (QUADRIVALENT)         [64692], Vaccine Administration, Initial         [59322]              See Patient Instructions    Colleen Marroquin MD  [facility]

## 2017-12-19 NOTE — PROGRESS NOTES

## 2018-05-29 ENCOUNTER — OFFICE VISIT (OUTPATIENT)
Dept: FAMILY MEDICINE | Facility: CLINIC | Age: 63
End: 2018-05-29
Payer: COMMERCIAL

## 2018-05-29 VITALS
OXYGEN SATURATION: 95 % | SYSTOLIC BLOOD PRESSURE: 133 MMHG | HEART RATE: 83 BPM | DIASTOLIC BLOOD PRESSURE: 87 MMHG | WEIGHT: 216 LBS | HEIGHT: 64 IN | RESPIRATION RATE: 16 BRPM | TEMPERATURE: 98.7 F | BODY MASS INDEX: 36.88 KG/M2

## 2018-05-29 DIAGNOSIS — H81.13 BENIGN PAROXYSMAL POSITIONAL VERTIGO DUE TO BILATERAL VESTIBULAR DISORDER: Primary | ICD-10-CM

## 2018-05-29 DIAGNOSIS — R09.81 SINUS CONGESTION: ICD-10-CM

## 2018-05-29 PROCEDURE — 99213 OFFICE O/P EST LOW 20 MIN: CPT | Performed by: PHYSICIAN ASSISTANT

## 2018-05-29 RX ORDER — FLUTICASONE PROPIONATE 50 MCG
1-2 SPRAY, SUSPENSION (ML) NASAL DAILY
Qty: 1 BOTTLE | Refills: 11 | Status: SHIPPED | OUTPATIENT
Start: 2018-05-29 | End: 2019-08-05

## 2018-05-29 RX ORDER — MECLIZINE HYDROCHLORIDE 25 MG/1
25-50 TABLET ORAL
COMMUNITY
Start: 2018-05-25 | End: 2019-01-30

## 2018-05-29 NOTE — MR AVS SNAPSHOT
After Visit Summary   5/29/2018    Lindsay Yuen    MRN: [id number]           Patient Information     Date Of Birth          1955        Visit Information        Provider Department      5/29/2018 2:10 PM Rancho Tong PA-C [facility]        Today's Diagnoses     Benign paroxysmal positional vertigo due to bilateral vestibular disorder    -  1    Sinus congestion          Care Instructions                Positional Vertigo          What is positional vertigo?   Positional vertigo is an inner ear problem. It causes brief but sometimes severe feelings of spinning. Some people feel that their head or body is spinning. Others feel the room is spinning. People often say they are dizzy, but dizzy is a very general term. Vertigo, on the other hand, is the very specific feeling of uncontrollable spinning.   Symptoms of positional vertigo happen suddenly when you change the position of your head.   How does it occur?   In the inner part of your ear are 3 semicircular canals. Movement of the fluid in these canals helps your brain maintain your balance and know what position you are in (for example, standing up, lying down, or standing on your head).   Sometimes small crystals of calcium develop and float in the fluid in the inner ear. This can happen after a head injury, with a severe cold, or simply as a part of normal aging. The crystals can cause vertigo when you change head position and they strike against nerve endings in the semicircular canals. Usually the calcium crystals dissolve in a few weeks and stop causing vertigo. However, sometimes the crystals do not dissolve and the vertigo returns from time to time.   What are the symptoms?   A sudden feeling that you are spinning, or that the room is spinning, is the main symptom. You may feel the vertigo when you first wake up. It may seem that any turn of your head brings on brief but intense spells of vertigo. It may happen  when you tilt your head, look up or down, or roll over in bed.   You may have nausea and vomiting along with the vertigo. Even if a spell of vertigo is brief, you may have a feeling of queasiness for several minutes or even hours afterward.   How is it diagnosed?   Your healthcare provider will ask about your symptoms and examine you. You may also be given a Nazario-Hallpike position test.   You start the Nazario-Hallpike test by sitting upright on the examining table. Your healthcare provider slowly brings your head down over the edge of the table and turns your head to one side. If you have positional vertigo, your provider will see your eyes making fast, jerky movements called nystagmus. If no nystagmus is seen, your provider will repeat the test, this time turning your head to the opposite side, to test the other inner ear. If you then have nystagmus and vertigo, the ear that is pointing toward the floor is the one causing the problem. The nystagmus and vertigo will slow down and stop after 15 to 20 seconds. If you do not move your head, no more symptoms will occur. When you sit back up, you will have vertigo again, but for a shorter time.   Other tests you may have are:   an ear exam   an audiogram to check your hearing   a test of your nerve responses   an electronystagmogram (ENG) test.   How is it treated?   Mild vertigo is often treated with medicine. The most common medicine for this problem is meclizine. It is taken up to 4 times a day for the vertigo and nausea or vomiting. One of the problems with this medicine is that it causes drowsiness. This is not as much of a problem if you have severe vertigo, which usually requires bed rest. Then the medicine can help you sleep and get relief from the vertigo while you sleep.   Your healthcare provider may recommend techniques that use gravity to move the crystals away from the nerve endings into an area of the inner ear that won't cause any problems. These are called  repositioning techniques.   One repositioning technique is the Epley maneuver. It can be very helpful. Your healthcare provider will move your head into 4 positions. You will hold each position for about 30 seconds.   Your healthcare provider may also suggest that you do Pedersen-Daroff exercises. Your provider may recommend that you do these exercises 3 times a day for 2 weeks. To do these exercises:   Start by sitting upright on your bed.   Lie on your left side, with your head angled upward about shelter. (Imagine that you are looking at the head of someone standing about 6 feet in front of you.) Stay in this position for 30 seconds, or, if you are having vertigo, until the vertigo stops.   Return to the sitting position for 30 seconds.   Lie on your right side, and follow the same routine.   Your healthcare provider may refer you to a physical therapist to learn and practice these repositioning techniques.   Rarely, when repositioning techniques don't help and the vertigo has not gone away after a few weeks, severe cases may eventually require surgery.   How long will the effects last?   Even without treatment, positional vertigo usually goes away within several weeks. Sometimes it recurs despite treatment.   How do I take care of myself?   If your vertigo is mild, you may be able to continue your usual activities, especially if you have opportunities to sit and rest when you have vertigo.   If your vertigo does not allow you to continue your usual routine, you should rest at home.   Use medicine as prescribed by your healthcare provider to help stop symptoms of dizziness, nausea, and vomiting.   Follow your instructions for using the repositioning techniques.   Do not try to drive, operate tools or machinery, or do other tasks, even cooking, that could endanger yourself or others if you suddenly become dizzy.   Follow your healthcare provider's recommendations for follow-up visits.   Contact your healthcare  provider if:   Your symptoms seem to be getting worse, more frequent, or longer lasting.   You develop new symptoms, such as a loss of hearing or severe headache.     Published by [employer].  This content is reviewed periodically and is subject to change as new health information becomes available. The information is intended to inform and educate and is not a replacement for medical evaluation, advice, diagnosis or treatment by a healthcare professional.   Developed by [employer].   ? 2010 [employer] and/or its affiliates. All Rights Reserved.   Copyright   [employer] Reference Systems 2011  Adult Health Advisor                 Follow-ups after your visit        Who to contact     If you have questions or need follow up information about today's clinic visit or your schedule please contact [facility] directly at [phone].  Normal or non-critical lab and imaging results will be communicated to you by iDubbahart, letter or phone within 4 business days after the clinic has received the results. If you do not hear from us within 7 days, please contact the clinic through [employer] or phone. If you have a critical or abnormal lab result, we will notify you by phone as soon as possible.  Submit refill requests through [employer] or call your pharmacy and they will forward the refill request to us. Please allow 3 business days for your refill to be completed.          Additional Information About Your Visit        [employer] Information     [employer] gives you secure access to your electronic health record. If you see a primary care provider, you can also send messages to your care team and make appointments. If you have questions, please call your primary care clinic.  If you do not have a primary care provider, please call [phone] and they will assist you.        Care EveryWhere ID     This is your Care EveryWhere ID. This could be used by other organizations to access your [facility]  "records  [id number]        Your Vitals Were     Pulse Temperature Respirations Height Pulse Oximetry BMI (Body Mass Index)    83 98.7  F (37.1  C) (Oral) 16 5' 4\" (1.626 m) 95% 37.08 kg/m2       Blood Pressure from Last 3 Encounters:   05/29/18 133/87   12/19/17 147/85   06/17/17 146/78    Weight from Last 3 Encounters:   05/29/18 216 lb (98 kg)   12/19/17 213 lb (96.6 kg)   06/17/17 218 lb (98.9 kg)              Today, you had the following     No orders found for display         Today's Medication Changes          These changes are accurate as of 5/29/18  2:28 PM.  If you have any questions, ask your nurse or doctor.               Start taking these medicines.        Dose/Directions    fluticasone 50 MCG/ACT spray   Commonly known as:  FLONASE   Used for:  Sinus congestion   Started by:  Rancho Tong PA-C        Dose:  1-2 spray   Spray 1-2 sprays into both nostrils daily   Quantity:  1 Bottle   Refills:  11            Where to get your medicines      These medications were sent to [id number] [address] [address]     Phone:  [phone]     fluticasone 50 MCG/ACT spray                Primary Care Provider Office Phone # Fax #    Colleen Marroquin [phone] [address] [address] [facility] [address]        Equal Access to Services     [facility] AH: Hadii aad ku hadasho Soomaali, waaxda luqadaha, qaybta kaalmada adeegyada, golden espinal. So [facility] [phone].    ATENCIÓN: Si habla español, tiene a spivey disposición servicios gratuitos de asistencia lingüística. Llame al [phone].    We comply with applicable federal civil rights laws and [place] laws. We do not discriminate on the basis of race, color, national origin, age, disability, sex, sexual orientation, or gender identity.            Thank you!     Thank you for choosing [facility]  [facility]. Our goal " is always to provide you with excellent care. Hearing back from our patients is one way we can continue to improve our services. Please take a few minutes to complete the written survey that you may receive in the mail after your visit with us. Thank you!             Your Updated Medication List - Protect others around you: Learn how to safely use, store and throw away your medicines at www.disposemymeds.org.          This list is accurate as of 5/29/18  2:28 PM.  Always use your most recent med list.                   Brand Name Dispense Instructions for use Diagnosis    aspirin 81 MG tablet      Take 1 tablet by mouth daily.        CALCIUM 600 + D 600-200 MG-UNIT Tabs          2 pill a day        CENTRUM SILVER per tablet     0    ONE DAILY        fluticasone 50 MCG/ACT spray    FLONASE    1 Bottle    Spray 1-2 sprays into both nostrils daily    Sinus congestion       folic acid 1 MG tablet    FOLVITE         ONE DAILY        losartan-hydrochlorothiazide 100-25 MG per tablet    HYZAAR    90 tablet    TAKE 1 TABLET BY MOUTH ONCE DAILY. NEED APPT FOR REFILLS    Hypertension goal BP (blood pressure) < 150/90       meclizine 25 MG tablet    ANTIVERT     Take 25-50 mg by mouth        simvastatin 10 MG tablet    ZOCOR    90 tablet    Take 1 tablet (10 mg) by mouth At Bedtime    Hyperlipidemia with target LDL less than 130       vitamin E 400 UNIT capsule          ONE CAPSULE DAILY

## 2018-05-29 NOTE — PATIENT INSTRUCTIONS
Positional Vertigo          What is positional vertigo?   Positional vertigo is an inner ear problem. It causes brief but sometimes severe feelings of spinning. Some people feel that their head or body is spinning. Others feel the room is spinning. People often say they are dizzy, but dizzy is a very general term. Vertigo, on the other hand, is the very specific feeling of uncontrollable spinning.   Symptoms of positional vertigo happen suddenly when you change the position of your head.   How does it occur?   In the inner part of your ear are 3 semicircular canals. Movement of the fluid in these canals helps your brain maintain your balance and know what position you are in (for example, standing up, lying down, or standing on your head).   Sometimes small crystals of calcium develop and float in the fluid in the inner ear. This can happen after a head injury, with a severe cold, or simply as a part of normal aging. The crystals can cause vertigo when you change head position and they strike against nerve endings in the semicircular canals. Usually the calcium crystals dissolve in a few weeks and stop causing vertigo. However, sometimes the crystals do not dissolve and the vertigo returns from time to time.   What are the symptoms?   A sudden feeling that you are spinning, or that the room is spinning, is the main symptom. You may feel the vertigo when you first wake up. It may seem that any turn of your head brings on brief but intense spells of vertigo. It may happen when you tilt your head, look up or down, or roll over in bed.   You may have nausea and vomiting along with the vertigo. Even if a spell of vertigo is brief, you may have a feeling of queasiness for several minutes or even hours afterward.   How is it diagnosed?   Your healthcare provider will ask about your symptoms and examine you. You may also be given a Nazario-Hallpike position test.   You start the Osco-Hallpike test by sitting upright  on the examining table. Your healthcare provider slowly brings your head down over the edge of the table and turns your head to one side. If you have positional vertigo, your provider will see your eyes making fast, jerky movements called nystagmus. If no nystagmus is seen, your provider will repeat the test, this time turning your head to the opposite side, to test the other inner ear. If you then have nystagmus and vertigo, the ear that is pointing toward the floor is the one causing the problem. The nystagmus and vertigo will slow down and stop after 15 to 20 seconds. If you do not move your head, no more symptoms will occur. When you sit back up, you will have vertigo again, but for a shorter time.   Other tests you may have are:   an ear exam   an audiogram to check your hearing   a test of your nerve responses   an electronystagmogram (ENG) test.   How is it treated?   Mild vertigo is often treated with medicine. The most common medicine for this problem is meclizine. It is taken up to 4 times a day for the vertigo and nausea or vomiting. One of the problems with this medicine is that it causes drowsiness. This is not as much of a problem if you have severe vertigo, which usually requires bed rest. Then the medicine can help you sleep and get relief from the vertigo while you sleep.   Your healthcare provider may recommend techniques that use gravity to move the crystals away from the nerve endings into an area of the inner ear that won't cause any problems. These are called repositioning techniques.   One repositioning technique is the Epley maneuver. It can be very helpful. Your healthcare provider will move your head into 4 positions. You will hold each position for about 30 seconds.   Your healthcare provider may also suggest that you do Pedersen-Daroff exercises. Your provider may recommend that you do these exercises 3 times a day for 2 weeks. To do these exercises:   Start by sitting upright on your bed.    Lie on your left side, with your head angled upward about alf. (Imagine that you are looking at the head of someone standing about 6 feet in front of you.) Stay in this position for 30 seconds, or, if you are having vertigo, until the vertigo stops.   Return to the sitting position for 30 seconds.   Lie on your right side, and follow the same routine.   Your healthcare provider may refer you to a physical therapist to learn and practice these repositioning techniques.   Rarely, when repositioning techniques don't help and the vertigo has not gone away after a few weeks, severe cases may eventually require surgery.   How long will the effects last?   Even without treatment, positional vertigo usually goes away within several weeks. Sometimes it recurs despite treatment.   How do I take care of myself?   If your vertigo is mild, you may be able to continue your usual activities, especially if you have opportunities to sit and rest when you have vertigo.   If your vertigo does not allow you to continue your usual routine, you should rest at home.   Use medicine as prescribed by your healthcare provider to help stop symptoms of dizziness, nausea, and vomiting.   Follow your instructions for using the repositioning techniques.   Do not try to drive, operate tools or machinery, or do other tasks, even cooking, that could endanger yourself or others if you suddenly become dizzy.   Follow your healthcare provider's recommendations for follow-up visits.   Contact your healthcare provider if:   Your symptoms seem to be getting worse, more frequent, or longer lasting.   You develop new symptoms, such as a loss of hearing or severe headache.     Published by [employer].  This content is reviewed periodically and is subject to change as new health information becomes available. The information is intended to inform and educate and is not a replacement for medical evaluation, advice, diagnosis or treatment by a healthcare  professional.   Developed by [employer].   ? 2010 [employer] and/or its affiliates. All Rights Reserved.   Copyright   [employer] 2011  [employer]ult [employer]visor

## 2018-05-29 NOTE — NURSING NOTE
"Chief Complaint   Patient presents with     Hospital F/U     Health Maintenance     UTD       Initial /87  Pulse 83  Temp 98.7  F (37.1  C) (Oral)  Resp 16  Ht 5' 4\" (1.626 m)  Wt 216 lb (98 kg)  SpO2 95%  BMI 37.08 kg/m2 Estimated body mass index is 37.08 kg/(m^2) as calculated from the following:    Height as of this encounter: 5' 4\" (1.626 m).    Weight as of this encounter: 216 lb (98 kg).  Medication Reconciliation: complete  Luz Maria Godoy CMA    "

## 2018-05-29 NOTE — PROGRESS NOTES
SUBJECTIVE:                                                    Lindsay Yuen is a 63 year old female who presents to clinic today for the following health issues:    ED/UC Followup:    Facility:  [facility]  Date of visit: 5/25/18  Reason for visit: Dizziness   Current Status: better but still present  70% better.   Mild sinus congestion and increase allergies.      Care Everywhere Reviewed    Chief Complaint:  Dizzy    History of Present Illness:  WALTER Reeves presents the ER today with complaints of dizziness and spinning sensation which started when she woke up this morning. She reports that she woke up around 430 and went to sit up and had spinning sensation. She is able to lie back down and after trying to get up 2 more times the spinning sensation had resolved but she noted that she felt a little off balance and slightly dizzy but this did not affect her ability to get up and get ready. She does report a spinning sensation when she woke up Wednesday morning but this resolved after lying back down for a few minutes and did not recur. She denies any headache, nausea, numbness, tingling, or weakness in her extremities, chest pain, or shortness of breath. She has noted that her ears have felt somewhat plugged and she has slight frontal head pressure which she attributes to sinuses but she has no nasal discharge. She does have history of sinus issues but is not currently on any allergy medications or using any nasal decongestant. She has had no recent medication changes. She does note that she was stuck in a hot gymnasium at work yesterday for about 3 hours and only had one bottle of water last night after getting home and feels she may have been a little dehydrated. She currently takes medications for blood pressure and cholesterol as well as an aspirin. She presented to the ER today because she was concerned about a possible stroke.        Problem list and histories reviewed & adjusted, as  indicated.  Additional history: as documented    Patient Active Problem List   Diagnosis     Hyperlipidemia with target LDL less than 130     Cervical polyp     Hypertension goal BP (blood pressure) < 150/90     Advanced directives, counseling/discussion     Morbid obesity due to excess calories (H)     Past Surgical History:   Procedure Laterality Date     CHOLECYSTECTOMY, LAPOROSCOPIC              Social History   Substance Use Topics     Smoking status: Never Smoker     Smokeless tobacco: Never Used      [facility]     Alcohol use Yes      Comment: occasional     Family History   Problem Relation Age of Onset     Breast Cancer Mother      Hypertension Mother      Lipids Mother      CANCER Father      kidney cancer     DIABETES Paternal Grandfather      adult     Breast Cancer Sister      CANCER Sister      HEART DISEASE Maternal Aunt      CANCER Sister      pancreatic         Current Outpatient Prescriptions   Medication Sig Dispense Refill     aspirin 81 MG tablet Take 1 tablet by mouth daily.       CALCIUM 600 + D 600-200 MG-UNIT OR TABS 2 pill a day  3     CENTRUM SILVER OR TABS ONE DAILY 0 0     fluticasone (FLONASE) 50 MCG/ACT spray Spray 1-2 sprays into both nostrils daily 1 Bottle 11     FOLIC ACID 1 MG OR TABS ONE DAILY       losartan-hydrochlorothiazide (HYZAAR) 100-25 MG per tablet TAKE 1 TABLET BY MOUTH ONCE DAILY. NEED APPT FOR REFILLS 90 tablet 1     meclizine (ANTIVERT) 25 MG tablet Take 25-50 mg by mouth       simvastatin (ZOCOR) 10 MG tablet Take 1 tablet (10 mg) by mouth At Bedtime 90 tablet 3     VITAMIN E 400 UNIT OR CAPS ONE CAPSULE DAILY       Allergies   Allergen Reactions     No Known Drug Allergies      BP Readings from Last 3 Encounters:   05/29/18 133/87   12/19/17 147/85   06/17/17 146/78    Wt Readings from Last 3 Encounters:   05/29/18 216 lb (98 kg)   12/19/17 213 lb (96.6 kg)   06/17/17 218 lb (98.9 kg)                  Labs reviewed in  "EPIC    ROS:  Constitutional, HEENT, cardiovascular, pulmonary, gi and gu systems are negative, except as otherwise noted.    OBJECTIVE:     /87  Pulse 83  Temp 98.7  F (37.1  C) (Oral)  Resp 16  Ht 5' 4\" (1.626 m)  Wt 216 lb (98 kg)  SpO2 95%  BMI 37.08 kg/m2  Body mass index is 37.08 kg/(m^2).  GENERAL: healthy, alert and no distress  EYES: Eyes grossly normal to inspection, PERRL and conjunctivae and sclerae normal  HENT: ear canals and TM's normal, nose and mouth without ulcers or lesions  NECK: no adenopathy, no asymmetry, masses, or scars and thyroid normal to palpation  RESP: lungs clear to auscultation - no rales, rhonchi or wheezes  CV: regular rate and rhythm, normal S1 S2, no S3 or S4, no murmur, click or rub, no peripheral edema and peripheral pulses strong  ABDOMEN: soft, nontender, no hepatosplenomegaly, no masses and bowel sounds normal  MS: no gross musculoskeletal defects noted, no edema  SKIN: no suspicious lesions or rashes  NEURO: Normal strength and tone, mentation intact and speech normal  CN 2-12 intact  PSYCH: mentation appears normal, affect normal/bright    Diagnostic Test Results:  none     ASSESSMENT/PLAN:         ICD-10-CM    1. Benign paroxysmal positional vertigo due to bilateral vestibular disorder H81.13    2. Sinus congestion R09.81 fluticasone (FLONASE) 50 MCG/ACT spray     See Patient Instructions  Patient reassurance  Ok for flonase  warning signs discussed. side effects discussed  Recheck as needed     Rancho Tong PA-C  [facility]    "

## 2018-06-20 DIAGNOSIS — I10 HYPERTENSION GOAL BP (BLOOD PRESSURE) < 150/90: ICD-10-CM

## 2018-06-20 RX ORDER — LOSARTAN POTASSIUM AND HYDROCHLOROTHIAZIDE 25; 100 MG/1; MG/1
1 TABLET ORAL DAILY
Qty: 90 TABLET | Refills: 1 | Status: SHIPPED | OUTPATIENT
Start: 2018-06-20 | End: 2018-12-21

## 2018-07-06 NOTE — PROGRESS NOTES
"  SUBJECTIVE:   Lindsay Yuen is a 63 year old female who presents to clinic today for the following health issues:      Hypertension Follow-up      Outpatient blood pressures {ISCHECKIN}    Low Salt Diet: { :527887::\"no added salt\"}  Hypertension ROS: {HTN [facility] ROS:5727::\"taking medications as instructed\",\"no medication side effects noted\",\"no TIA's\",\"no chest pain on exertion\",\"no dyspnea on exertion\",\"no swelling of ankles\"}.  No headache or visual changes.      Amount of exercise or physical activity: {Exercise frequency days per week:486908}    Problems taking medications regularly: {Med Problems:788911::\"No\"}    Medication side effects: {CHRONIC MED SIDE EFFECTS:900789::\"none\"}    Diet: { :320098}        {additional problems for provider to add:043013}    Problem list and histories reviewed & adjusted, as indicated.  Additional history: {NONE - AS DOCUMENTED:060182::\"as documented\"}    {HIST REVIEW/ LINKS 2:710192}    Reviewed and updated as needed this visit by clinical staff       Reviewed and updated as needed this visit by Provider         {PROVIDER CHARTING PREFERENCE:065454}  "

## 2018-07-11 ENCOUNTER — OFFICE VISIT (OUTPATIENT)
Dept: FAMILY MEDICINE | Facility: CLINIC | Age: 63
End: 2018-07-11
Payer: COMMERCIAL

## 2018-07-11 VITALS
TEMPERATURE: 98.3 F | HEART RATE: 76 BPM | OXYGEN SATURATION: 93 % | BODY MASS INDEX: 36.87 KG/M2 | SYSTOLIC BLOOD PRESSURE: 126 MMHG | RESPIRATION RATE: 16 BRPM | WEIGHT: 214.8 LBS | DIASTOLIC BLOOD PRESSURE: 70 MMHG

## 2018-07-11 DIAGNOSIS — I10 HYPERTENSION GOAL BP (BLOOD PRESSURE) < 150/90: Primary | ICD-10-CM

## 2018-07-11 DIAGNOSIS — E66.01 MORBID OBESITY DUE TO EXCESS CALORIES (H): ICD-10-CM

## 2018-07-11 DIAGNOSIS — Z11.9 SCREENING EXAMINATION FOR INFECTIOUS DISEASE: ICD-10-CM

## 2018-07-11 DIAGNOSIS — D23.5 BENIGN NEOPLASM OF SKIN OF TRUNK, EXCEPT SCROTUM: ICD-10-CM

## 2018-07-11 LAB
ANION GAP SERPL CALCULATED.3IONS-SCNC: 8 MMOL/L (ref 3–14)
BUN SERPL-MCNC: 25 MG/DL (ref 7–30)
CALCIUM SERPL-MCNC: 9.1 MG/DL (ref 8.5–10.1)
CHLORIDE SERPL-SCNC: 106 MMOL/L (ref 94–109)
CO2 SERPL-SCNC: 26 MMOL/L (ref 20–32)
CREAT SERPL-MCNC: 0.55 MG/DL (ref 0.52–1.04)
GFR SERPL CREATININE-BSD FRML MDRD: >90 ML/MIN/1.7M2
GLUCOSE SERPL-MCNC: 104 MG/DL (ref 70–99)
POTASSIUM SERPL-SCNC: 3.7 MMOL/L (ref 3.4–5.3)
SODIUM SERPL-SCNC: 140 MMOL/L (ref 133–144)

## 2018-07-11 PROCEDURE — 99214 OFFICE O/P EST MOD 30 MIN: CPT | Performed by: FAMILY MEDICINE

## 2018-07-11 PROCEDURE — 87389 HIV-1 AG W/HIV-1&-2 AB AG IA: CPT | Performed by: FAMILY MEDICINE

## 2018-07-11 PROCEDURE — 36415 COLL VENOUS BLD VENIPUNCTURE: CPT | Performed by: FAMILY MEDICINE

## 2018-07-11 PROCEDURE — 80048 BASIC METABOLIC PNL TOTAL CA: CPT | Performed by: FAMILY MEDICINE

## 2018-07-11 NOTE — MR AVS SNAPSHOT
After Visit Summary   7/11/2018    Lindsay Yuen    MRN: [id number]           Patient Information     Date Of Birth          1955        Visit Information        Provider Department      7/11/2018 8:55 AM Colleen Marroquin MD [facility]        Today's Diagnoses     Hypertension goal BP (blood pressure) < 150/90    -  1    Hyperlipidemia with target LDL less than 130        Morbid obesity due to excess calories (H)        Benign neoplasm of skin of trunk, except scrotum        Screening examination for infectious disease           Follow-ups after your visit        Follow-up notes from your care team     Return in about 6 months (around 1/11/2019) for Fasting Lab Work, Wellness Exam.      Who to contact     If you have questions or need follow up information about today's clinic visit or your schedule please contact [facility] directly at [phone].  Normal or non-critical lab and imaging results will be communicated to you by [employer], letter or phone within 4 business days after the clinic has received the results. If you do not hear from us within 7 days, please contact the clinic through [employer] or phone. If you have a critical or abnormal lab result, we will notify you by phone as soon as possible.  Submit refill requests through [employer] or call your pharmacy and they will forward the refill request to us. Please allow 3 business days for your refill to be completed.          Additional Information About Your Visit        MyChart Information     ShareThis gives you secure access to your electronic health record. If you see a primary care provider, you can also send messages to your care team and make appointments. If you have questions, please call your primary care clinic.  If you do not have a primary care provider, please call [phone] and they will assist you.        Care EveryWhere ID     This is your Care EveryWhere ID. This could be used by other  organizations to access your [facility] medical records  [id number]        Your Vitals Were     Pulse Temperature Respirations Pulse Oximetry BMI (Body Mass Index)       76 98.3  F (36.8  C) (Oral) 16 93% 36.87 kg/m2        Blood Pressure from Last 3 Encounters:   07/11/18 126/70   05/29/18 133/87   12/19/17 147/85    Weight from Last 3 Encounters:   07/11/18 214 lb 12.8 oz (97.4 kg)   05/29/18 216 lb (98 kg)   12/19/17 213 lb (96.6 kg)              We Performed the Following     BASIC METABOLIC PANEL     HIV Screening        Primary Care Provider Office Phone # Fax #    Colleen Marroquin [phone] [id number] [facility] [address]        Equal Access to Services     [facility] [place] [place] : Hadii kenneth aponet hadasho Soomaali, waaxda luqadaha, qaybta kaalmada adeegyada, waxedis coyne . So [facility] [phone].    ATENCIÓN: Si habla español, tiene a spivey disposición servicios gratuitos de asistencia lingüística. Elvis al [phone].    We comply with applicable federal civil rights laws and [place] laws. We do not discriminate on the basis of race, color, national origin, age, disability, sex, sexual orientation, or gender identity.            Thank you!     Thank you for choosing [facility]  for your care. Our goal is always to provide you with excellent care. Hearing back from our patients is one way we can continue to improve our services. Please take a few minutes to complete the written survey that you may receive in the mail after your visit with us. Thank you!             Your Updated Medication List - Protect others around you: Learn how to safely use, store and throw away your medicines at www.disposemymeds.org.          This list is accurate as of 7/11/18  9:18 AM.  Always use your most recent med list.                   Brand Name Dispense Instructions for use Diagnosis    aspirin 81 MG tablet      Take 1 tablet by mouth daily.        CALCIUM 600 +  D 600-200 MG-UNIT Tabs          2 pill a day        CENTRUM SILVER per tablet     0    ONE DAILY        fluticasone 50 MCG/ACT spray    FLONASE    1 Bottle    Spray 1-2 sprays into both nostrils daily    Sinus congestion       losartan-hydrochlorothiazide 100-25 MG per tablet    HYZAAR    90 tablet    Take 1 tablet by mouth daily    Hypertension goal BP (blood pressure) < 150/90       meclizine 25 MG tablet    ANTIVERT     Take 25-50 mg by mouth        simvastatin 10 MG tablet    ZOCOR    90 tablet    Take 1 tablet (10 mg) by mouth At Bedtime    Hyperlipidemia with target LDL less than 130       vitamin E 400 UNIT capsule          ONE CAPSULE DAILY

## 2018-07-11 NOTE — NURSING NOTE
"Chief Complaint   Patient presents with     Hypertension       Initial /78  Pulse 76  Temp 98.3  F (36.8  C) (Oral)  Resp 16  Wt 214 lb 12.8 oz (97.4 kg)  SpO2 93%  BMI 36.87 kg/m2 Estimated body mass index is 36.87 kg/(m^2) as calculated from the following:    Height as of 5/29/18: 5' 4\" (1.626 m).    Weight as of this encounter: 214 lb 12.8 oz (97.4 kg).  Medication Reconciliation: complete  Tariq Mcdermott CMA    "

## 2018-07-11 NOTE — PROGRESS NOTES
SUBJECTIVE:   Lindsay Yuen is a 63 year old female who presents to clinic today for the following health issues:      History of Present Illness     Hypertension:     Outpatient blood pressures:  Are being checked (Purchased a new wrist cuff.  Reading on home cuff in clinic today shows 125/80 , and 130/77 )    Blood pressures checked at:  Home    Dietary sodium intake::  Low salt diet    Diet:  Low salt  Frequency of exercise:  2-3 days/week  Duration of exercise:  15-30 minutes  Additional concerns today:  No  Hypertension ROS: taking medications as instructed, no medication side effects noted, no TIA's, no chest pain on exertion, no dyspnea on exertion, no swelling of ankles.  No headache or visual changes.    Has skin lesion she would like checked. + itch, no color or size change      Problem list and histories reviewed & adjusted, as indicated.  Additional history: as documented        Patient Active Problem List   Diagnosis     Hyperlipidemia with target LDL less than 130     Cervical polyp     Hypertension goal BP (blood pressure) < 150/90     Advanced directives, counseling/discussion     Morbid obesity due to excess calories (H)     Past Surgical History:   Procedure Laterality Date     CHOLECYSTECTOMY, LAPOROSCOPIC              Social History   Substance Use Topics     Smoking status: Never Smoker     Smokeless tobacco: Never Used      Comment: no smokers in the [facility]     Alcohol use Yes      Comment: occasional     Family History   Problem Relation Age of Onset     Breast Cancer Mother      Hypertension Mother      Lipids Mother      Cancer Father      kidney cancer     Diabetes Paternal Grandfather      adult     Breast Cancer Sister      Cancer Sister      HEART DISEASE Maternal Aunt      Cancer Sister      pancreatic         Current Outpatient Prescriptions   Medication Sig Dispense Refill     aspirin 81 MG tablet Take 1 tablet by mouth daily.       CALCIUM 600 + D 600-200 MG-UNIT OR TABS 2 pill  a day  3     CENTRUM SILVER OR TABS ONE DAILY 0 0     fluticasone (FLONASE) 50 MCG/ACT spray Spray 1-2 sprays into both nostrils daily 1 Bottle 11     losartan-hydrochlorothiazide (HYZAAR) 100-25 MG per tablet Take 1 tablet by mouth daily 90 tablet 1     simvastatin (ZOCOR) 10 MG tablet Take 1 tablet (10 mg) by mouth At Bedtime 90 tablet 3     VITAMIN E 400 UNIT OR CAPS ONE CAPSULE DAILY       meclizine (ANTIVERT) 25 MG tablet Take 25-50 mg by mouth       BP Readings from Last 3 Encounters:   07/11/18 126/70   05/29/18 133/87   12/19/17 147/85    Wt Readings from Last 3 Encounters:   07/11/18 214 lb 12.8 oz (97.4 kg)   05/29/18 216 lb (98 kg)   12/19/17 213 lb (96.6 kg)                  Labs reviewed in EPIC    ROS:  Constitutional, HEENT, cardiovascular, pulmonary, gi and gu systems are negative, except as otherwise noted.    OBJECTIVE:     /70  Pulse 76  Temp 98.3  F (36.8  C) (Oral)  Resp 16  Wt 214 lb 12.8 oz (97.4 kg)  SpO2 93%  BMI 36.87 kg/m2  Body mass index is 36.87 kg/(m^2).  GENERAL: healthy, alert and no distress  EYES: Eyes grossly normal to inspection, PERRL and conjunctivae and sclerae normal  RESP: lungs clear to auscultation - no rales, rhonchi or wheezes  CV: regular rate and rhythm, normal S1 S2, no S3 or S4, no murmur, click or rub, no peripheral edema and peripheral pulses strong  MS: no gross musculoskeletal defects noted, no edema  SKIN: no suspicious lesions or rashes, small lesion on trunk,   PSYCH: mentation appears normal, affect normal/bright    Diagnostic Test Results:  none     ASSESSMENT/PLAN:     (I10) Hypertension goal BP (blood pressure) < 150/90  (primary encounter diagnosis)  Comment: to goal  Plan: BASIC METABOLIC PANEL        Due for labs f/u 6 months for OV and labs AFE      (E66.01) Morbid obesity due to excess calories (H)  Comment: stable  Plan: reviewed need for wt loss and how to do that    (D23.5) Benign neoplasm of skin of trunk, except scrotum  Comment:  likely benign  Plan: return if desires removal, likely shave    (Z11.9) Screening examination for infectious disease  Comment: due  Plan: HIV Screening              See Patient Instructions    Colleen Marroquin MD  [facility]

## 2018-07-12 LAB — HIV 1+2 AB+HIV1 P24 AG SERPL QL IA: NONREACTIVE

## 2018-08-16 ENCOUNTER — RADIANT APPOINTMENT (OUTPATIENT)
Dept: MAMMOGRAPHY | Facility: CLINIC | Age: 63
End: 2018-08-16
Attending: FAMILY MEDICINE
Payer: COMMERCIAL

## 2018-08-16 DIAGNOSIS — Z12.31 VISIT FOR SCREENING MAMMOGRAM: ICD-10-CM

## 2018-08-16 PROCEDURE — 77067 SCR MAMMO BI INCL CAD: CPT | Mod: TC

## 2018-12-21 DIAGNOSIS — I10 HYPERTENSION GOAL BP (BLOOD PRESSURE) < 150/90: ICD-10-CM

## 2018-12-24 RX ORDER — LOSARTAN POTASSIUM AND HYDROCHLOROTHIAZIDE 25; 100 MG/1; MG/1
TABLET ORAL
Qty: 90 TABLET | Refills: 1 | Status: SHIPPED | OUTPATIENT
Start: 2018-12-24 | End: 2019-06-25

## 2019-01-15 ENCOUNTER — DOCUMENTATION ONLY (OUTPATIENT)
Dept: FAMILY MEDICINE | Facility: CLINIC | Age: 64
End: 2019-01-15

## 2019-01-15 DIAGNOSIS — E78.5 HYPERLIPIDEMIA WITH TARGET LDL LESS THAN 130: Primary | ICD-10-CM

## 2019-01-15 DIAGNOSIS — Z00.00 ROUTINE GENERAL MEDICAL EXAMINATION AT A HEALTH CARE FACILITY: ICD-10-CM

## 2019-01-17 DIAGNOSIS — E78.5 HYPERLIPIDEMIA WITH TARGET LDL LESS THAN 130: ICD-10-CM

## 2019-01-17 DIAGNOSIS — Z00.00 ROUTINE GENERAL MEDICAL EXAMINATION AT A HEALTH CARE FACILITY: ICD-10-CM

## 2019-01-17 LAB
ANION GAP SERPL CALCULATED.3IONS-SCNC: 7 MMOL/L (ref 3–14)
BUN SERPL-MCNC: 18 MG/DL (ref 7–30)
CALCIUM SERPL-MCNC: 9.4 MG/DL (ref 8.5–10.1)
CHLORIDE SERPL-SCNC: 104 MMOL/L (ref 94–109)
CHOLEST SERPL-MCNC: 185 MG/DL
CO2 SERPL-SCNC: 27 MMOL/L (ref 20–32)
CREAT SERPL-MCNC: 0.6 MG/DL (ref 0.52–1.04)
GFR SERPL CREATININE-BSD FRML MDRD: >90 ML/MIN/{1.73_M2}
GLUCOSE SERPL-MCNC: 89 MG/DL (ref 70–99)
HDLC SERPL-MCNC: 62 MG/DL
LDLC SERPL CALC-MCNC: 100 MG/DL
NONHDLC SERPL-MCNC: 123 MG/DL
POTASSIUM SERPL-SCNC: 3.9 MMOL/L (ref 3.4–5.3)
SODIUM SERPL-SCNC: 138 MMOL/L (ref 133–144)
TRIGL SERPL-MCNC: 115 MG/DL

## 2019-01-17 PROCEDURE — 36415 COLL VENOUS BLD VENIPUNCTURE: CPT | Performed by: FAMILY MEDICINE

## 2019-01-17 PROCEDURE — 80048 BASIC METABOLIC PNL TOTAL CA: CPT | Performed by: FAMILY MEDICINE

## 2019-01-17 PROCEDURE — 80061 LIPID PANEL: CPT | Performed by: FAMILY MEDICINE

## 2019-01-24 NOTE — PROGRESS NOTES
"  SUBJECTIVE:   Lindsay Yuen is a 63 year old female who presents to clinic today for the following health issues:      Hyperlipidemia Follow-Up      Rate your low fat/cholesterol diet?: { :625090::\"good\"}    Taking statin?  { :724751::\"No\"}    Other lipid medications/supplements?:  { :601967::\"none\"}    Hypertension Follow-up      Outpatient blood pressures {ISCHECKIN}    Low Salt Diet: { :653452::\"no added salt\"}  Hypertension ROS: {HTN [facility] ROS:5727::\"taking medications as instructed\",\"no medication side effects noted\",\"no TIA's\",\"no chest pain on exertion\",\"no dyspnea on exertion\",\"no swelling of ankles\"}.  No headache or visual changes.      Amount of exercise or physical activity: {Exercise frequency days per week:437089}    Problems taking medications regularly: {Med Problems:926113::\"No\"}    Medication side effects: {CHRONIC MED SIDE EFFECTS:036159::\"none\"}    Diet: { :427435}        {additional problems for provider to add:963893}    Problem list and histories reviewed & adjusted, as indicated.  Additional history: {NONE - AS DOCUMENTED:908127::\"as documented\"}    {HIST REVIEW/ LINKS 2:382821}    Reviewed and updated as needed this visit by clinical staff       Reviewed and updated as needed this visit by Provider         {PROVIDER CHARTING PREFERENCE:929451}  "

## 2019-01-30 ENCOUNTER — OFFICE VISIT (OUTPATIENT)
Dept: FAMILY MEDICINE | Facility: CLINIC | Age: 64
End: 2019-01-30
Payer: COMMERCIAL

## 2019-01-30 VITALS
TEMPERATURE: 98.5 F | WEIGHT: 221.2 LBS | DIASTOLIC BLOOD PRESSURE: 70 MMHG | BODY MASS INDEX: 37.76 KG/M2 | HEIGHT: 64 IN | HEART RATE: 76 BPM | SYSTOLIC BLOOD PRESSURE: 138 MMHG | RESPIRATION RATE: 18 BRPM

## 2019-01-30 DIAGNOSIS — Z23 NEED FOR VACCINATION: ICD-10-CM

## 2019-01-30 DIAGNOSIS — I10 HYPERTENSION GOAL BP (BLOOD PRESSURE) < 150/90: Primary | ICD-10-CM

## 2019-01-30 DIAGNOSIS — E66.01 MORBID OBESITY DUE TO EXCESS CALORIES (H): ICD-10-CM

## 2019-01-30 DIAGNOSIS — E78.5 HYPERLIPIDEMIA WITH TARGET LDL LESS THAN 130: ICD-10-CM

## 2019-01-30 PROCEDURE — 99214 OFFICE O/P EST MOD 30 MIN: CPT | Mod: 25 | Performed by: FAMILY MEDICINE

## 2019-01-30 PROCEDURE — 90471 IMMUNIZATION ADMIN: CPT | Performed by: FAMILY MEDICINE

## 2019-01-30 PROCEDURE — 90715 TDAP VACCINE 7 YRS/> IM: CPT | Performed by: FAMILY MEDICINE

## 2019-01-30 RX ORDER — ATORVASTATIN CALCIUM 10 MG/1
10 TABLET, FILM COATED ORAL DAILY
Qty: 90 TABLET | Refills: 3 | Status: SHIPPED | OUTPATIENT
Start: 2019-01-30 | End: 2020-02-20

## 2019-01-30 ASSESSMENT — MIFFLIN-ST. JEOR: SCORE: 1538.36

## 2019-01-30 NOTE — NURSING NOTE
Prior to injection verified patient identity using patient's name and date of birth.    Patient instructed to wait in clinic for 20 minutes following injection and to notify staff of any adverse reactions immediately.     Screening Questionnaire for Adult Immunization     Are you sick today?   No   Do you have allergies to medications, food or any vaccine?   No   Have you ever had a serious reaction after receiving a vaccination?   No   Do you have a long-term health problem with heart disease, lung disease,  asthma, kidney disease, diabetes, anemia, metabolic or blood disease?   No   Do you have cancer, leukemia, AIDS, or any immune system problem?   No   Do you take cortisone, prednisone, other steroids, or anticancer drugs, or  have you had any x-ray (radiation) treatments?   No   Have you had a seizure, brain, or other nervous system problem?   No   During the past year, have you received a transfusion of blood or blood       products, or been given a medicine called immune (gamma) globulin?   No   For women: Are you pregnant or is there a chance you could become         pregnant during the next month?   No   Have you received any vaccinations in the past 4 weeks?   No    Immunization questionnaire answers were all negative.      MNVFC doesn't apply on this patient  Tariq Mcdermott, [facility]

## 2019-01-30 NOTE — PATIENT INSTRUCTIONS
Amazon shopping list:    Fit Crunch cheese puffs    Fit crunch protein bars - peanut butter chocolate.      [employer] username: [email or web address]

## 2019-01-30 NOTE — PROGRESS NOTES
SUBJECTIVE:   Lindsay Yuen is a 63 year old female who presents to clinic today for the following health issues:      History of Present Illness     Hyperlipidemia:     Low fat/chol diet rating::  Good    Taking Statins::  YES    Side effects from hypolipidemia medication::  No muscle aches from Statin    Hypertension:     Outpatient blood pressures:  Are being checked (130-140/70-76)    Blood pressures checked at:  Home    Dietary sodium intake::  Low salt diet    Diet:  Low salt and Low fat/cholesterol  Frequency of exercise:  2-3 days/week  Duration of exercise:  15-30 minutes  Medication side effects:  None  Additional concerns today:  No  Hypertension ROS: taking medications as instructed, no medication side effects noted, no TIA's, no chest pain on exertion, no dyspnea on exertion, no swelling of ankles.  No headache or visual changes.    Weight up over the holidays, working to get back on track    Problem list and histories reviewed & adjusted, as indicated.  Additional history: as documented        Patient Active Problem List   Diagnosis     Hyperlipidemia with target LDL less than 130     Cervical polyp     Hypertension goal BP (blood pressure) < 150/90     Advanced directives, counseling/discussion     Morbid obesity due to excess calories (H)     Past Surgical History:   Procedure Laterality Date     CHOLECYSTECTOMY, LAPOROSCOPIC              Social History     Tobacco Use     Smoking status: Never Smoker     Smokeless tobacco: Never Used     Tobacco comment: no smokers in the [facility]   Substance Use Topics     Alcohol use: Yes     Comment: occasional     Family History   Problem Relation Age of Onset     Breast Cancer Mother      Hypertension Mother      Lipids Mother      Cancer Father         kidney cancer     Diabetes Paternal Grandfather         adult     Breast Cancer Sister      Cancer Sister      Heart Disease Maternal Aunt      Cancer Sister         pancreatic         Current Outpatient  "Medications   Medication Sig Dispense Refill     atorvastatin (LIPITOR) 10 MG tablet Take 1 tablet (10 mg) by mouth daily 90 tablet 3     aspirin 81 MG tablet Take 1 tablet by mouth daily.       CALCIUM 600 + D 600-200 MG-UNIT OR TABS 2 pill a day  3     CENTRUM SILVER OR TABS ONE DAILY 0 0     fluticasone (FLONASE) 50 MCG/ACT spray Spray 1-2 sprays into both nostrils daily 1 Bottle 11     losartan-hydrochlorothiazide (HYZAAR) 100-25 MG tablet TAKE ONE TABLET BY MOUTH ONCE DAILY. 90 tablet 1     VITAMIN E 400 UNIT OR CAPS ONE CAPSULE DAILY       BP Readings from Last 3 Encounters:   01/30/19 138/70   07/11/18 126/70   05/29/18 133/87    Wt Readings from Last 3 Encounters:   01/30/19 100.3 kg (221 lb 3.2 oz)   07/11/18 97.4 kg (214 lb 12.8 oz)   05/29/18 98 kg (216 lb)                  Labs reviewed in EPIC    ROS:  Constitutional, HEENT, cardiovascular, pulmonary, gi and gu systems are negative, except as otherwise noted.    OBJECTIVE:     /70   Pulse 76   Temp 98.5  F (36.9  C) (Oral)   Resp 18   Ht 1.626 m (5' 4\")   Wt 100.3 kg (221 lb 3.2 oz)   BMI 37.97 kg/m    Body mass index is 37.97 kg/m .  GENERAL: healthy, alert and no distress  EYES: Eyes grossly normal to inspection, PERRL and conjunctivae and sclerae normal  RESP: lungs clear to auscultation - no rales, rhonchi or wheezes  CV: regular rate and rhythm, normal S1 S2, no S3 or S4, no murmur, click or rub, no peripheral edema and peripheral pulses strong  MS: no gross musculoskeletal defects noted, no edema  SKIN: no suspicious lesions or rashes  PSYCH: mentation appears normal, affect normal/bright    Diagnostic Test Results:  none     ASSESSMENT/PLAN:     (I10) Hypertension goal BP (blood pressure) < 150/90  (primary encounter diagnosis)  Comment: to goal  Plan:  Refill x 6 months continue meds  f/u 6 months for OV and labs wellness exam    (E78.5) Hyperlipidemia with target LDL less than 130  Comment: to goal  Plan: atorvastatin (LIPITOR) 10 " MG tablet        Change to atorvastatin to avoid interactions when on simvastatin  Ok to change back if more expensive or side effects     (E66.01) Morbid obesity due to excess calories (H)  Comment: increased wt due to holiday eating   Plan: getting back on track    (Z23) Need for vaccination  Comment: due  Plan: TDAP VACCINE (ADACEL) [41672.002], 1st          Administration  [38379]              Patient Instructions   Amazon shopping list:    Fit Crunch cheese puffs    Fit crunch protein bars - peanut butter chocolate.      MyChart username: DOROTHY Marroquin MD  [facility]

## 2019-02-04 NOTE — PROGRESS NOTES
.Please place or confirm pending lab orders for upcoming lab appointment on 1/17/19  Thank you,  Abbey  
Please review and sign Pending Pre-visit Labs in [facility].Labs 01/17/19 and BP/Chol 01/30/19   Ema MOCK    
None felt

## 2019-06-25 DIAGNOSIS — I10 HYPERTENSION GOAL BP (BLOOD PRESSURE) < 150/90: ICD-10-CM

## 2019-06-25 NOTE — LETTER
June 26, 2019    Lindsay Yuen  [address] [place] [facility] [address]    Dear Lindsay,       We recently received a refill request for Losartan-hydrochlorothiazide (HYZAAR) 100-25 MG tablet.  We have refilled this for a one time 30 day supply only because you are due for a:    Hypertension office visit and Non Fasting appointment      Please schedule this lab appointment 4-5 days prior to the office visit.     Please call at your earliest convenience so that there will not be a delay with your future refills.          Thank you,   Your [facility] Team/mkr  [phone]

## 2019-06-26 RX ORDER — LOSARTAN POTASSIUM AND HYDROCHLOROTHIAZIDE 25; 100 MG/1; MG/1
TABLET ORAL
Qty: 30 TABLET | Refills: 0 | Status: SHIPPED | OUTPATIENT
Start: 2019-06-26 | End: 2019-07-26

## 2019-06-26 NOTE — TELEPHONE ENCOUNTER
:;  Per last OV patient due now for her 6 month follow up for bp and labwork with MD.   1 month refill given on her losartan- hydrochlorothiazide . Meliza Martinez RN

## 2019-07-12 ENCOUNTER — TRANSFERRED RECORDS (OUTPATIENT)
Dept: HEALTH INFORMATION MANAGEMENT | Facility: CLINIC | Age: 64
End: 2019-07-12

## 2019-07-24 ENCOUNTER — DOCUMENTATION ONLY (OUTPATIENT)
Dept: FAMILY MEDICINE | Facility: CLINIC | Age: 64
End: 2019-07-24

## 2019-07-24 DIAGNOSIS — E78.5 HYPERLIPIDEMIA WITH TARGET LDL LESS THAN 130: Primary | ICD-10-CM

## 2019-07-24 DIAGNOSIS — I10 HYPERTENSION GOAL BP (BLOOD PRESSURE) < 150/90: ICD-10-CM

## 2019-07-24 NOTE — PROGRESS NOTES
Please review and sign Pending Pre-visit Labs in [facility]. Labs 07/29/19 and BP med check 08/05/19  Ema MOCK

## 2019-07-24 NOTE — PROGRESS NOTES
.Please place or confirm pending lab orders for upcoming lab appointment on 7/29/19  Thank you,  Abbey

## 2019-07-26 DIAGNOSIS — I10 HYPERTENSION GOAL BP (BLOOD PRESSURE) < 150/90: ICD-10-CM

## 2019-07-29 DIAGNOSIS — I10 HYPERTENSION GOAL BP (BLOOD PRESSURE) < 150/90: ICD-10-CM

## 2019-07-29 DIAGNOSIS — E78.5 HYPERLIPIDEMIA WITH TARGET LDL LESS THAN 130: ICD-10-CM

## 2019-07-29 LAB
ANION GAP SERPL CALCULATED.3IONS-SCNC: 6 MMOL/L (ref 3–14)
BUN SERPL-MCNC: 17 MG/DL (ref 7–30)
CALCIUM SERPL-MCNC: 9.3 MG/DL (ref 8.5–10.1)
CHLORIDE SERPL-SCNC: 105 MMOL/L (ref 94–109)
CHOLEST SERPL-MCNC: 163 MG/DL
CO2 SERPL-SCNC: 28 MMOL/L (ref 20–32)
CREAT SERPL-MCNC: 0.59 MG/DL (ref 0.52–1.04)
GFR SERPL CREATININE-BSD FRML MDRD: >90 ML/MIN/{1.73_M2}
GLUCOSE SERPL-MCNC: 102 MG/DL (ref 70–99)
HDLC SERPL-MCNC: 63 MG/DL
LDLC SERPL CALC-MCNC: 79 MG/DL
NONHDLC SERPL-MCNC: 100 MG/DL
POTASSIUM SERPL-SCNC: 3.8 MMOL/L (ref 3.4–5.3)
SODIUM SERPL-SCNC: 139 MMOL/L (ref 133–144)
TRIGL SERPL-MCNC: 107 MG/DL

## 2019-07-29 PROCEDURE — 36415 COLL VENOUS BLD VENIPUNCTURE: CPT | Performed by: FAMILY MEDICINE

## 2019-07-29 PROCEDURE — 80061 LIPID PANEL: CPT | Performed by: FAMILY MEDICINE

## 2019-07-29 PROCEDURE — 80048 BASIC METABOLIC PNL TOTAL CA: CPT | Performed by: FAMILY MEDICINE

## 2019-07-29 RX ORDER — LOSARTAN POTASSIUM AND HYDROCHLOROTHIAZIDE 25; 100 MG/1; MG/1
TABLET ORAL
Qty: 30 TABLET | Refills: 0 | Status: SHIPPED | OUTPATIENT
Start: 2019-07-29 | End: 2019-08-05

## 2019-07-29 NOTE — RESULT ENCOUNTER NOTE
I have reviewed the schedule of future appointments and this patient has an appointment scheduled for 8-5-2019 with Dr Colleen Marroquin     Visit date not found

## 2019-07-29 NOTE — PROGRESS NOTES
"Subjective     Lindsay Yuen is a 64 year old female who presents to clinic today for the following health issues:    HPI   Hypertension Follow-up      Do you check your blood pressure regularly outside of the clinic? { :393500}     Are you following a low salt diet? { :714475}    Are your blood pressures ever more than 140 on the top number (systolic) OR more   than 90 on the bottom number (diastolic), for example 140/90? { :334436}     Hypertension ROS: {HTN CVS ROS:5727::\"taking medications as instructed\",\"no medication side effects noted\",\"no TIA's\",\"no chest pain on exertion\",\"no dyspnea on exertion\",\"no swelling of ankles\"}.  No headache or visual changes.        Amount of exercise or physical activity: {Exercise frequency days per week:119557}    Problems taking medications regularly: {Med Problems:617138::\"No\"}    Medication side effects: {CHRONIC MED SIDE EFFECTS:894642::\"none\"}    Diet: { :048419}      {additonal problems for provider to add (Optional):473992}    {HIST REVIEW/ LINKS 2 (Optional):666140}    Reviewed and updated as needed this visit by Provider         Review of Systems   {ROS COMP (Optional):013716}      Objective    There were no vitals taken for this visit.  There is no height or weight on file to calculate BMI.  Physical Exam   {Exam List (Optional):703753}    {Diagnostic Test Results (Optional):785568::\"Diagnostic Test Results:\",\"Labs reviewed in Epic\"}        {PROVIDER CHARTING PREFERENCE:092020}    "

## 2019-08-05 ENCOUNTER — OFFICE VISIT (OUTPATIENT)
Dept: FAMILY MEDICINE | Facility: CLINIC | Age: 64
End: 2019-08-05
Payer: COMMERCIAL

## 2019-08-05 VITALS
BODY MASS INDEX: 36.7 KG/M2 | OXYGEN SATURATION: 96 % | WEIGHT: 213.8 LBS | DIASTOLIC BLOOD PRESSURE: 72 MMHG | RESPIRATION RATE: 20 BRPM | SYSTOLIC BLOOD PRESSURE: 134 MMHG | HEART RATE: 82 BPM | TEMPERATURE: 98.4 F

## 2019-08-05 DIAGNOSIS — I10 HYPERTENSION GOAL BP (BLOOD PRESSURE) < 150/90: Primary | ICD-10-CM

## 2019-08-05 DIAGNOSIS — Z12.31 ENCOUNTER FOR SCREENING MAMMOGRAM FOR BREAST CANCER: ICD-10-CM

## 2019-08-05 PROCEDURE — 99213 OFFICE O/P EST LOW 20 MIN: CPT | Performed by: FAMILY MEDICINE

## 2019-08-05 RX ORDER — LOSARTAN POTASSIUM AND HYDROCHLOROTHIAZIDE 25; 100 MG/1; MG/1
1 TABLET ORAL DAILY
Qty: 90 TABLET | Refills: 1 | Status: SHIPPED | OUTPATIENT
Start: 2019-08-05 | End: 2019-12-09

## 2019-08-05 NOTE — PROGRESS NOTES
Subjective     Lindsay Yuen is a 64 year old female who presents to clinic today for the following health issues:    History of Present Illness        Hyperlipidemia:  She presents for follow up of hyperlipidemia.  She is taking medication to lower cholesterol. She is not having myalgia or other side effects to statin medications.She is not reporting shortness of breath, increased sweating or nausea with activity, left-sided neck or arm pain, chest pain or pressure, or pain in calves when walking 1-2 blocks.    Hypertension: She presents for follow up of hypertension.  She does check blood pressure  regularly outside of the clinic. Outpatient blood pressures have not been over 140/90. She follows a low salt diet.     She eats 2-3 servings of fruits and vegetables daily.She consumes 2 sweetened beverage(s) daily.  She is taking medications regularly.     Hypertension ROS: taking medications as instructed, no medication side effects noted, no TIA's, no chest pain on exertion, no dyspnea on exertion, no swelling of ankles.  No headache or visual changes.            Patient Active Problem List   Diagnosis     Hyperlipidemia with target LDL less than 130     Cervical polyp     Hypertension goal BP (blood pressure) < 150/90     Advanced directives, counseling/discussion     Morbid obesity due to excess calories (H)     Past Surgical History:   Procedure Laterality Date     CHOLECYSTECTOMY, LAPOROSCOPIC              Social History     Tobacco Use     Smoking status: Never Smoker     Smokeless tobacco: Never Used     Tobacco comment: no smokers in the [facility]   Substance Use Topics     Alcohol use: Yes     Comment: occasional     Family History   Problem Relation Age of Onset     Breast Cancer Mother      Hypertension Mother      Lipids Mother      Cancer Father         kidney cancer     Diabetes Paternal Grandfather         adult     Breast Cancer Sister      Cancer Sister      Heart Disease Maternal Aunt      Cancer  Sister         pancreatic         Current Outpatient Medications   Medication Sig Dispense Refill     aspirin 81 MG tablet Take 1 tablet by mouth daily.       atorvastatin (LIPITOR) 10 MG tablet Take 1 tablet (10 mg) by mouth daily 90 tablet 3     CALCIUM 600 + D 600-200 MG-UNIT OR TABS 2 pill a day  3     CENTRUM SILVER OR TABS ONE DAILY 0 0     losartan-hydrochlorothiazide (HYZAAR) 100-25 MG tablet Take 1 tablet by mouth daily 90 tablet 1     VITAMIN E 400 UNIT OR CAPS ONE CAPSULE DAILY       fluticasone (FLONASE) 50 MCG/ACT spray Spray 1-2 sprays into both nostrils daily (Patient not taking: Reported on 8/5/2019) 1 Bottle 11     BP Readings from Last 3 Encounters:   08/05/19 134/72   01/30/19 138/70   07/11/18 126/70    Wt Readings from Last 3 Encounters:   08/05/19 97 kg (213 lb 12.8 oz)   01/30/19 100.3 kg (221 lb 3.2 oz)   07/11/18 97.4 kg (214 lb 12.8 oz)                    Reviewed and updated as needed this visit by Provider  Tobacco  Allergies  Meds  Problems  Med Hx  Surg Hx  Fam Hx         Review of Systems   ROS COMP: Constitutional, HEENT, cardiovascular, pulmonary, gi and gu systems are negative, except as otherwise noted.      Objective    /72   Pulse 82   Temp 98.4  F (36.9  C) (Oral)   Resp 20   Wt 97 kg (213 lb 12.8 oz)   SpO2 96%   BMI 36.70 kg/m    Body mass index is 36.7 kg/m .  Physical Exam   GENERAL: healthy, alert and no distress  EYES: Eyes grossly normal to inspection, PERRL and conjunctivae and sclerae normal  RESP: lungs clear to auscultation - no rales, rhonchi or wheezes  CV: regular rate and rhythm, normal S1 S2, no S3 or S4, no murmur, click or rub, no peripheral edema and peripheral pulses strong  MS: no gross musculoskeletal defects noted, no edema  SKIN: no suspicious lesions or rashes  PSYCH: mentation appears normal, affect normal/bright    Diagnostic Test Results:  Labs reviewed in Epic        Assessment & Plan     (I10) Hypertension goal BP (blood pressure)  < 150/90  (primary encounter diagnosis)  Comment: to goal  Plan: losartan-hydrochlorothiazide (HYZAAR) 100-25 MG        tablet         Refill x 6 months   f/u 6 months for OV and labs preventive/wellness exam    (Z12.31) Encounter for screening mammogram for breast cancer  Comment: due  Plan: *MA Screening Digital Bilateral                 Patient Instructions       Continue all medications.      Continue the great work on your health.          Return in about 6 months (around 2/5/2020) for Preventive exam.    Colleen Marroquin MD  [facility]

## 2019-08-05 NOTE — NURSING NOTE
"Chief Complaint   Patient presents with     Hypertension       Initial BP (!) 142/84   Pulse 82   Temp 98.4  F (36.9  C) (Oral)   Resp 20   Wt 97 kg (213 lb 12.8 oz)   SpO2 96%   BMI 36.70 kg/m   Estimated body mass index is 36.7 kg/m  as calculated from the following:    Height as of 1/30/19: 1.626 m (5' 4\").    Weight as of this encounter: 97 kg (213 lb 12.8 oz).  Medication Reconciliation: complete  Tariq Mcdermott CMA    "

## 2019-08-20 ENCOUNTER — OFFICE VISIT (OUTPATIENT)
Dept: FAMILY MEDICINE | Facility: CLINIC | Age: 64
End: 2019-08-20
Payer: COMMERCIAL

## 2019-08-20 VITALS
HEART RATE: 76 BPM | BODY MASS INDEX: 36.7 KG/M2 | OXYGEN SATURATION: 94 % | DIASTOLIC BLOOD PRESSURE: 80 MMHG | TEMPERATURE: 98.2 F | RESPIRATION RATE: 20 BRPM | SYSTOLIC BLOOD PRESSURE: 132 MMHG | WEIGHT: 213.8 LBS

## 2019-08-20 DIAGNOSIS — H25.011 CORTICAL AGE-RELATED CATARACT OF RIGHT EYE: ICD-10-CM

## 2019-08-20 DIAGNOSIS — I10 HYPERTENSION GOAL BP (BLOOD PRESSURE) < 150/90: ICD-10-CM

## 2019-08-20 DIAGNOSIS — Z01.818 PREOP GENERAL PHYSICAL EXAM: Primary | ICD-10-CM

## 2019-08-20 DIAGNOSIS — E66.01 MORBID OBESITY DUE TO EXCESS CALORIES (H): ICD-10-CM

## 2019-08-20 PROCEDURE — 99214 OFFICE O/P EST MOD 30 MIN: CPT | Performed by: FAMILY MEDICINE

## 2019-08-20 RX ORDER — MOXIFLOXACIN 5 MG/ML
SOLUTION/ DROPS OPHTHALMIC 4 TIMES DAILY
Refills: 1 | COMMUNITY
Start: 2019-08-14 | End: 2020-02-05

## 2019-08-20 RX ORDER — PREDNISOLONE ACETATE 10 MG/ML
SUSPENSION/ DROPS OPHTHALMIC 4 TIMES DAILY
Refills: 1 | COMMUNITY
Start: 2019-08-14 | End: 2020-02-05

## 2019-08-20 NOTE — NURSING NOTE
"Chief Complaint   Patient presents with     Pre-Op Exam       Initial BP (!) 144/91   Pulse 76   Temp 98.2  F (36.8  C) (Oral)   Resp 20   Wt 97 kg (213 lb 12.8 oz)   SpO2 94%   BMI 36.70 kg/m   Estimated body mass index is 36.7 kg/m  as calculated from the following:    Height as of 1/30/19: 1.626 m (5' 4\").    Weight as of this encounter: 97 kg (213 lb 12.8 oz).  Medication Reconciliation: complete  Tariq Mcdermott CMA    "

## 2019-08-20 NOTE — PROGRESS NOTES
[facility]  [address] [facility] [facility] [address] [phone]  Dept: [phone]    PRE-OP EVALUATION:  Today's date: 2019    Lindsay Yuen (: 1955) presents for pre-operative evaluation assessment as requested by Dr. Bales.  She requires evaluation and anesthesia risk assessment prior to undergoing surgery/procedure for treatment of Cataracts .    Fax number for surgical facility: [phone]  Primary Physician: Colleen Marroquin  Type of Anesthesia Anticipated: to be determined    Patient has a Health Care Directive or Living Will:  NO    Preop Questions 2019   Who is doing your surgery? [facility]   What are you having done? [facility]   Date of Surgery/Procedure: 19   Facility or Hospital where procedure/surgery will be performed: [facility]   1.  Do you have a history of Heart attack, stroke, stent, coronary bypass surgery, or other heart surgery? No   2.  Do you ever have any pain or discomfort in your chest? No   3.  Do you have a history of  Heart Failure? No   4.   Are you troubled by shortness of breath when:  walking on a level surface, or up a slight hill, or at night? No   5.  Do you currently have a cold, bronchitis or other respiratory infection? No   6.  Do you have a cough, shortness of breath, or wheezing? No   7.  Do you sometimes get pains in the calves of your legs when you walk? No   8. Do you or anyone in your family have previous history of blood clots? YES - mom, lower leg   9.  Do you or does anyone in your family have a serious bleeding problem such as prolonged bleeding following surgeries or cuts? No   10. Have you ever had problems with anemia or been told to take iron pills? No   11. Have you had any abnormal blood loss such as black, tarry or bloody stools, or abnormal vaginal bleeding? No   12. Have you ever had a blood transfusion? No   13. Have you or any of your relatives ever had problems with anesthesia? No   14.  Do you have sleep apnea, excessive snoring or daytime drowsiness? UNKNOWN - possible snoring   15. Do you have any prosthetic heart valves? No   16. Do you have prosthetic joints? No   17. Is there any chance that you may be pregnant? No         HPI:     HPI related to upcoming procedure: right eye cataract requiring removal      HYPERTENSION - Patient has longstanding history of HTN , currently denies any symptoms referable to elevated blood pressure. Specifically denies chest pain, palpitations, dyspnea, orthopnea, PND or peripheral edema. Blood pressure readings have been in normal range. Current medication regimen is as listed below. Patient denies any side effects of medication.       MEDICAL HISTORY:     Patient Active Problem List    Diagnosis Date Noted     Advanced directives, counseling/discussion 01/24/2017     Priority: Medium     Discussed Advance Directive planning with patient; information given to patient to review.  Sharron Escobar CMA           Morbid obesity due to excess calories (H) 01/24/2017     Priority: Medium     Hypertension goal BP (blood pressure) < 150/90 09/30/2015     Priority: Medium     Cervical polyp 09/20/2011     Priority: Medium     Hyperlipidemia with target LDL less than 130 08/17/2010     Priority: Medium     Diagnosis updated by automated process. Provider to review and confirm.        Past Medical History:   Diagnosis Date     Abnormal Pap smear 1985    cryo, nl paps since     HTN (hypertension)      Hyperlipidemia LDL goal < 130      Past Surgical History:   Procedure Laterality Date     CHOLECYSTECTOMY, LAPOROSCOPIC            Current Outpatient Medications   Medication Sig Dispense Refill     aspirin 81 MG tablet Take 1 tablet by mouth daily.       atorvastatin (LIPITOR) 10 MG tablet Take 1 tablet (10 mg) by mouth daily 90 tablet 3     CALCIUM 600 + D 600-200 MG-UNIT OR TABS 2 pill a day  3     CENTRUM SILVER OR TABS ONE DAILY 0 0     losartan-hydrochlorothiazide  (HYZAAR) 100-25 MG tablet Take 1 tablet by mouth daily 90 tablet 1     moxifloxacin (VIGAMOX) 0.5 % ophthalmic solution 4 times daily  1     prednisoLONE acetate (PRED FORTE) 1 % ophthalmic suspension 4 times daily  1     VITAMIN E 400 UNIT OR CAPS ONE CAPSULE DAILY       OTC products: None, except as noted above    Allergies   Allergen Reactions     No Known Drug Allergies       Latex Allergy: NO    Social History     Tobacco Use     Smoking status: Never Smoker     Smokeless tobacco: Never Used     Tobacco comment: no smokers in the [facility]   Substance Use Topics     Alcohol use: Yes     Comment: occasional     History   Drug Use No       REVIEW OF SYSTEMS:   CONSTITUTIONAL: NEGATIVE for fever, chills, change in weight  ENT/MOUTH: NEGATIVE for ear, mouth and throat problems  RESP: NEGATIVE for significant cough or SOB  CV: NEGATIVE for chest pain, palpitations or peripheral edema    EXAM:   /80   Pulse 76   Temp 98.2  F (36.8  C) (Oral)   Resp 20   Wt 97 kg (213 lb 12.8 oz)   SpO2 94%   BMI 36.70 kg/m    GENERAL APPEARANCE: healthy, alert and no distress  HENT: ear canals and TM's normal and nose and mouth without ulcers or lesions  RESP: lungs clear to auscultation - no rales, rhonchi or wheezes  CV: regular rate and rhythm, normal S1 S2, no S3 or S4 and no murmur, click or rub   ABDOMEN: soft, nontender, no HSM or masses and bowel sounds normal  NEURO: Normal strength and tone, sensory exam grossly normal, mentation intact and speech normal    DIAGNOSTICS:   No labs or EKG required for low risk surgery (cataract, skin procedure, breast biopsy, etc)    Recent Labs   Lab Test 07/29/19  0711 01/17/19  1023  03/05/14  1436   HGB  --   --   --  15.8*   PLT  --   --   --  298    138   < >  --    POTASSIUM 3.8 3.9   < >  --    CR 0.59 0.60   < >  --     < > = values in this interval not displayed.        IMPRESSION:   Reason for surgery/procedure: right eye cataract requiring removal    The proposed  surgical procedure is considered LOW risk.    REVISED CARDIAC RISK INDEX  The patient has the following serious cardiovascular risks for perioperative complications such as (MI, PE, VFib and 3  AV Block):  No serious cardiac risks  INTERPRETATION: 0 risks: Class I (very low risk - 0.4% complication rate)    The patient has the following additional risks for perioperative complications:  No identified additional risks      ICD-10-CM    1. Preop general physical exam Z01.818    2. Cortical age-related cataract of right eye H25.011    3. Hypertension goal BP (blood pressure) < 150/90 I10    4. Morbid obesity due to excess calories (H) E66.01        RECOMMENDATIONS:         --Patient is to take all scheduled medications on the day of surgery.    APPROVAL GIVEN to proceed with proposed procedure, without further diagnostic evaluation       Signed Electronically by: Colleen Marroquin MD    Copy of this evaluation report is provided to requesting physician.    [facility] Preop Guidelines    Revised Cardiac Risk Index

## 2019-09-20 ENCOUNTER — ANCILLARY PROCEDURE (OUTPATIENT)
Dept: MAMMOGRAPHY | Facility: CLINIC | Age: 64
End: 2019-09-20
Attending: FAMILY MEDICINE
Payer: COMMERCIAL

## 2019-09-20 DIAGNOSIS — Z12.31 ENCOUNTER FOR SCREENING MAMMOGRAM FOR BREAST CANCER: ICD-10-CM

## 2019-09-20 PROCEDURE — 77067 SCR MAMMO BI INCL CAD: CPT | Mod: TC

## 2019-09-20 PROCEDURE — 77063 BREAST TOMOSYNTHESIS BI: CPT | Mod: TC

## 2019-09-28 ENCOUNTER — HEALTH MAINTENANCE LETTER (OUTPATIENT)
Age: 64
End: 2019-09-28

## 2019-12-09 ENCOUNTER — TELEPHONE (OUTPATIENT)
Dept: FAMILY MEDICINE | Facility: CLINIC | Age: 64
End: 2019-12-09

## 2019-12-09 DIAGNOSIS — I10 HYPERTENSION GOAL BP (BLOOD PRESSURE) < 150/90: Primary | ICD-10-CM

## 2019-12-09 RX ORDER — HYDROCHLOROTHIAZIDE 25 MG/1
25 TABLET ORAL DAILY
Qty: 90 TABLET | Refills: 0 | Status: SHIPPED | OUTPATIENT
Start: 2019-12-09 | End: 2020-01-29

## 2019-12-09 RX ORDER — LOSARTAN POTASSIUM 100 MG/1
100 TABLET ORAL DAILY
Qty: 90 TABLET | Refills: 0 | Status: SHIPPED | OUTPATIENT
Start: 2019-12-09 | End: 2020-01-29

## 2019-12-09 NOTE — TELEPHONE ENCOUNTER
Information below is reviewed with  Dr Colleen Marroquin, Verbal Orders okay to split medication.  Prescriptions are sent to pharmacy.    Per protocol, will route encounter to be cosigned by provider for Verbal Orders.  Denise Norris RN

## 2020-01-13 NOTE — PROGRESS NOTES
Subjective     Lindsay Yuen is a 64 year old female who presents to clinic today for the following health issues:    HPI   ED/UC Followup:    Facility:  [facility]   Date of visit: 1/12/20  Reason for visit: Lightheadedness   Current Status: Improved.   Is noting sinus symptoms x 4-5 days, sinus pressure and pain.   With last prescription fill received losartan and hydrochlorothiazide separate instead of combined in one pill like previously received.  Questioning if dizziness could be from change in prescription        New blood pressure medications started 6 months ago. Normally BP is in 130/75 range but was 162/98 in ED. Did not take hydrochlorothiazide this am due to the single dizzy episode, wondering if that caused it.    No hx of dizziness in past. No ear pain/hearing issues. Dizziness is exacerbated when standing up. Patient reports only one episode  of dizziness. ER r/o heart related causes. Hx of one episode of vertigo in past and treatment tried.     Frontal and maxillary sinus pressure for past 4-5 days with productive cough. Sx have gotten slightly worse. No fever. No OTC meds  for sinus pressure/cold sx.     Patient Active Problem List   Diagnosis     Hyperlipidemia with target LDL less than 130     Cervical polyp     Hypertension goal BP (blood pressure) < 140/90     Advanced directives, counseling/discussion     Morbid obesity due to excess calories (H)     Past Surgical History:   Procedure Laterality Date     CHOLECYSTECTOMY, LAPOROSCOPIC              Social History     Tobacco Use     Smoking status: Never Smoker     Smokeless tobacco: Never Used     Tobacco comment: no smokers in the [facility]   Substance Use Topics     Alcohol use: Yes     Comment: occasional     Family History   Problem Relation Age of Onset     Breast Cancer Mother      Hypertension Mother      Lipids Mother      Cancer Father         kidney cancer     Diabetes Paternal Grandfather         adult     Breast Cancer Sister       "Cancer Sister      Heart Disease Maternal Aunt      Cancer Sister         pancreatic         Reviewed and updated as needed this visit by Provider  Allergies  Surg Hx         Review of Systems   ROS COMP: Constitutional, HEENT, cardiovascular, pulmonary, gi and gu systems are negative, except as otherwise noted.      Objective    BP (!) 152/80   Pulse 87   Temp 98.3  F (36.8  C) (Oral)   Resp 20   Ht 1.626 m (5' 4\")   Wt 98 kg (216 lb)   BMI 37.08 kg/m    Body mass index is 37.08 kg/m .  Physical Exam   GENERAL: healthy, alert and no distress  EYES: Eyes grossly normal to inspection, PERRL and conjunctivae and sclerae normal  HENT: ear canals and TM's normal, nose and mouth without ulcers or lesions  NECK: no adenopathy, no asymmetry, masses, or scars and thyroid normal to palpation  RESP: lungs clear to auscultation - no rales, rhonchi or wheezes  CV: regular rate and rhythm, normal S1 S2, no S3 or S4, no murmur, click or rub, no peripheral edema and peripheral pulses strong  ABDOMEN: soft, nontender, no hepatosplenomegaly, no masses and bowel sounds normal  MS: no gross musculoskeletal defects noted, no edema  SKIN: no suspicious lesions or rashes  NEURO: CN 2-12 intact. Normal strength and tone, mentation intact and speech normal  PSYCH: mentation appears normal, affect normal/bright    Diagnostic Test Results:  Labs and ED work-up from 1/12/2020 reviewed in [facility].     No labs ordered.         Assessment & Plan     (I10) Hypertension goal BP (blood pressure) < 140/90  (primary encounter diagnosis)  Comment: BP medication adjustments made 6 months ago. BP high in ER and high in clinic today (even after provider rechecked it). Pt did not take hydrochlorothiazide this am so may explain why not to goal  Plan: Continue losartan 100mg and hydrochlorthiazide 25 mg QDAY. Follow-up with pharmacy in one weeks for BP recheck and bring BP cuff to ensure it accurate.     (J34.89) Sinus pressure  Comment: Sinus pressure " 4-5 days; getting slightly worse but no fever.   Plan: Start the below OTC meds. Follow-up if patient develops fever (>100.4) or sx persist for more than 10 days.     Mucinex OTC for sinus pressure/cold symptoms. Making sure you're hydrated.     Sudafed (behind the counter) for sinus pressure.     Tylenol or ibuprofen for discomfort/pain.     (J06.9) Upper respiratory tract infection, unspecified type  Comment: Productive cough/drainage present for 4-5 days; getting slightly worse but no fever.   Plan: See above plan for sinus pressure.     Return in about 1 week (around 1/22/2020), or BP Check with Pharmacy, for BP Recheck.    Schedule wellness exam 3/2020    DAREN Hayes MD    [facility]

## 2020-01-15 ENCOUNTER — OFFICE VISIT (OUTPATIENT)
Dept: FAMILY MEDICINE | Facility: CLINIC | Age: 65
End: 2020-01-15
Payer: COMMERCIAL

## 2020-01-15 VITALS
WEIGHT: 216 LBS | RESPIRATION RATE: 20 BRPM | HEART RATE: 87 BPM | TEMPERATURE: 98.3 F | HEIGHT: 64 IN | DIASTOLIC BLOOD PRESSURE: 80 MMHG | BODY MASS INDEX: 36.88 KG/M2 | SYSTOLIC BLOOD PRESSURE: 152 MMHG

## 2020-01-15 DIAGNOSIS — J06.9 UPPER RESPIRATORY TRACT INFECTION, UNSPECIFIED TYPE: ICD-10-CM

## 2020-01-15 DIAGNOSIS — I10 HYPERTENSION GOAL BP (BLOOD PRESSURE) < 140/90: Primary | ICD-10-CM

## 2020-01-15 DIAGNOSIS — J34.89 SINUS PRESSURE: ICD-10-CM

## 2020-01-15 PROCEDURE — 99214 OFFICE O/P EST MOD 30 MIN: CPT | Performed by: FAMILY MEDICINE

## 2020-01-15 RX ORDER — AMLODIPINE BESYLATE 5 MG/1
5 TABLET ORAL DAILY
Qty: 90 TABLET | Refills: 1 | Status: CANCELLED | OUTPATIENT
Start: 2020-01-15

## 2020-01-15 ASSESSMENT — MIFFLIN-ST. JEOR: SCORE: 1509.77

## 2020-01-15 NOTE — NURSING NOTE
"Chief Complaint   Patient presents with     RECHECK       Initial BP (!) 159/81   Pulse 87   Temp 98.3  F (36.8  C) (Oral)   Resp 20   Ht 1.626 m (5' 4\")   Wt 98 kg (216 lb)   BMI 37.08 kg/m   Estimated body mass index is 37.08 kg/m  as calculated from the following:    Height as of this encounter: 1.626 m (5' 4\").    Weight as of this encounter: 98 kg (216 lb).  Medication Reconciliation: complete  Tariq Mcdermott CMA    " [facility]        NAME: Nik Kuo is a 14 y o  male  : 2006    MRN: [id number]  DATE: December 10, 2019  TIME: 12:08 PM    Assessment and Plan   Acute pharyngitis, unspecified etiology [J02 9]  1  Acute pharyngitis, unspecified etiology  amoxicillin (AMOXIL) 875 mg tablet   2  Sore throat  POCT rapid strepA         Patient Instructions       Follow up with PCP in 3-5 days  Proceed to  ER if symptoms worsen  Chief Complaint     Chief Complaint   Patient presents with    Sore Throat     for 3 days    Cough     cough, sinus congestion for 3 days         History of Present Illness       Patient presents with 3 day history of sore throat runny nose and dry cough  Denies any fever chills headache myalgias arthralgias chest pain shortness of breath nausea vomiting  Mother states he has a history of strep throat  Review of Systems   Review of Systems   Constitutional: Negative for activity change, appetite change, chills and fever  HENT: Positive for rhinorrhea and sore throat  Negative for congestion, ear pain, postnasal drip and trouble swallowing  Respiratory: Positive for cough  Negative for wheezing  Cardiovascular: Negative for chest pain  Gastrointestinal: Negative for nausea and vomiting  Musculoskeletal: Negative for myalgias  Skin: Negative for rash  Neurological: Negative for headaches  Hematological: Negative for adenopathy  Current Medications       Current Outpatient Medications:     amoxicillin (AMOXIL) 875 mg tablet, Take 1 tablet (875 mg total) by mouth 2 (two) times a day for 7 days, Disp: 14 tablet, Rfl: 0    Current Allergies     Allergies as of 12/10/2019    (No Known Allergies)            The following portions of the patient's history were reviewed and updated as appropriate: allergies, current medications, past family history, past medical history, past social history, past surgical history and problem list      History reviewed   No pertinent past medical history  History reviewed  No pertinent surgical history  History reviewed  No pertinent family history  Medications have been verified  Objective   /76   Pulse 87   Temp 98 °F (36 7 °C) (Tympanic)   Resp (!) 20   Wt 84 4 kg (186 lb)   SpO2 99%        Physical Exam     Physical Exam   Constitutional: He is oriented to person, place, and time  He appears well-developed and well-nourished  HENT:   Head: Normocephalic and atraumatic  Right Ear: Tympanic membrane normal    Left Ear: Tympanic membrane normal    Mouth/Throat: Uvula is midline and mucous membranes are normal  No uvula swelling  No posterior oropharyngeal edema  Erythema soft palate tonsils no tonsillar exudates airway patent  Neck: Neck supple  Cardiovascular: Normal rate, regular rhythm and normal heart sounds  Pulmonary/Chest: Effort normal and breath sounds normal    Lymphadenopathy:     He has no cervical adenopathy  Neurological: He is alert and oriented to person, place, and time  Skin: Skin is warm and dry  No rash noted  Psychiatric: He has a normal mood and affect  His behavior is normal    Nursing note and vitals reviewed

## 2020-01-15 NOTE — PATIENT INSTRUCTIONS
Sinus Pressure, Upper Respiratory Infection:   Mucinex OTC for sinus pressure/cold symptoms. Making sure you're hydrated.     Sudafed (behind the counter) for sinus pressure.     Tylenol or ibuprofen for discomfort/pain.       High Blood Pressure  Continue losartan 100mg and hydrochlorthiazide 25 mg QDAY. Follow-up with pharmacy in one weeks for BP recheck and bring BP cuff to ensure it accurate.

## 2020-01-29 ENCOUNTER — ALLIED HEALTH/NURSE VISIT (OUTPATIENT)
Dept: FAMILY MEDICINE | Facility: CLINIC | Age: 65
End: 2020-01-29
Payer: COMMERCIAL

## 2020-01-29 VITALS — HEART RATE: 79 BPM | SYSTOLIC BLOOD PRESSURE: 148 MMHG | DIASTOLIC BLOOD PRESSURE: 78 MMHG

## 2020-01-29 DIAGNOSIS — Z01.30 BLOOD PRESSURE CHECK: Primary | ICD-10-CM

## 2020-01-29 DIAGNOSIS — I10 HYPERTENSION GOAL BP (BLOOD PRESSURE) < 150/90: ICD-10-CM

## 2020-01-29 PROCEDURE — 99207 ZZC NO CHARGE NURSE ONLY: CPT | Performed by: FAMILY MEDICINE

## 2020-01-29 RX ORDER — TRIAMTERENE AND HYDROCHLOROTHIAZIDE 37.5; 25 MG/1; MG/1
1 CAPSULE ORAL EVERY MORNING
Qty: 30 CAPSULE | Refills: 0 | Status: SHIPPED | OUTPATIENT
Start: 2020-01-29 | End: 2020-03-10 | Stop reason: SINTOL

## 2020-01-29 RX ORDER — LOSARTAN POTASSIUM 100 MG/1
100 TABLET ORAL DAILY
Qty: 90 TABLET | Refills: 0 | Status: SHIPPED | OUTPATIENT
Start: 2020-01-29 | End: 2020-03-10

## 2020-01-29 NOTE — PROGRESS NOTES
Lindsay Yuen was evaluated at [facility] on January 29, 2020 at which time her blood pressure was:    BP Readings from Last 3 Encounters:   01/29/20 (!) 148/78   01/15/20 (!) 152/80   08/20/19 132/80     Pulse Readings from Last 3 Encounters:   01/29/20 79   01/15/20 87   08/20/19 76       Reviewed lifestyle modifications for blood pressure control and reduction: including making healthy food choices, managing weight, getting regular exercise, smoking cessation, reducing alcohol consumption, monitoring blood pressure regularly.     Symptoms: None    BP Goal:< 140/90 mmHg    BP Assessment:  BP too high    Potential Reasons for BP too high: Unknown/Other: Unknown    BP Follow-Up Plan: Recheck BP in 30 days at pharmacy    Recommendation to Provider:None    Note completed by: Fredis Dixon RPh.  [facility]  [phone]

## 2020-01-29 NOTE — PROGRESS NOTES
Please notify pt that she will trial a different water pill that has an additional med to hydrochlorothiazide.      After 2 weeks on new med, needs bmp and pharm/anc bp check  Lab and meds ordered

## 2020-01-29 NOTE — Clinical Note
Routing message to PCP for review because BP checked at pharmacy is above goal. Recommended patient to follow-up with PCP.  PCP please close this encounter.Home monitor did not test accurate enough so we recommended replacing the wrist Home Monitor with a Cuff Home Monitor.Fredis Dixon RPh.[phone]

## 2020-01-30 ENCOUNTER — TRANSFERRED RECORDS (OUTPATIENT)
Dept: HEALTH INFORMATION MANAGEMENT | Facility: CLINIC | Age: 65
End: 2020-01-30

## 2020-01-30 NOTE — PROGRESS NOTES
Patient/parent is informed of MD note below, as it is written. Verbalized good understanding.  Follow up lab, 2/13/20 and blood pressure check.   Denise Norris RN

## 2020-02-05 ENCOUNTER — TELEPHONE (OUTPATIENT)
Dept: FAMILY MEDICINE | Facility: CLINIC | Age: 65
End: 2020-02-05

## 2020-02-05 ENCOUNTER — OFFICE VISIT (OUTPATIENT)
Dept: FAMILY MEDICINE | Facility: CLINIC | Age: 65
End: 2020-02-05
Payer: COMMERCIAL

## 2020-02-05 VITALS
WEIGHT: 217.6 LBS | TEMPERATURE: 98.4 F | HEART RATE: 90 BPM | RESPIRATION RATE: 22 BRPM | BODY MASS INDEX: 37.35 KG/M2 | SYSTOLIC BLOOD PRESSURE: 146 MMHG | DIASTOLIC BLOOD PRESSURE: 66 MMHG

## 2020-02-05 DIAGNOSIS — L50.9 HIVES: Primary | ICD-10-CM

## 2020-02-05 DIAGNOSIS — I10 HYPERTENSION GOAL BP (BLOOD PRESSURE) < 140/90: ICD-10-CM

## 2020-02-05 PROCEDURE — 99214 OFFICE O/P EST MOD 30 MIN: CPT | Performed by: FAMILY MEDICINE

## 2020-02-05 RX ORDER — LATANOPROST 50 UG/ML
1 SOLUTION/ DROPS OPHTHALMIC AT BEDTIME
COMMUNITY
Start: 2020-01-31 | End: 2021-10-05

## 2020-02-05 RX ORDER — HYDROCHLOROTHIAZIDE 25 MG/1
25 TABLET ORAL DAILY
Qty: 90 TABLET | Refills: 0 | Status: SHIPPED | OUTPATIENT
Start: 2020-02-05 | End: 2020-03-10

## 2020-02-05 RX ORDER — HYDROCHLOROTHIAZIDE 25 MG/1
25 TABLET ORAL DAILY
COMMUNITY
End: 2020-02-05

## 2020-02-05 NOTE — TELEPHONE ENCOUNTER
Patient reports, developed hives, lower neck, neck.    Denies facial swelling, tongue swelling, lip swelling, shortness of breath, wheezing, clearing of the throat.   Area is with itch.  Slight hive on inner arm    Areas are spot like, red.  Denies raised areas.   Advise evaluation.  Next 5 appointments (look out 90 days)    Feb 05, 2020  2:35 PM CST  [facility] [address] [facility] [address]  [phone]   Mar 10, 2020  9:55 AM CDT  PHYSICAL with Colleen Marroquin MD  [facility] [address] [facility] [address]  [phone]        Warning signs and symptoms are reviewed, facial swelling, lip swelling, tongue swelling, wheezing, shortness of breath  and pt should proceed to the ER.   Patient/parent verbalized understanding of instructions provided and agreed with the plan of care  Denise Norris RN

## 2020-02-05 NOTE — TELEPHONE ENCOUNTER
Reason for Call:  Other call back    Detailed comments:  Patient is calling stating started taking RX: terene-HCTZ (DYAZIDE) 37.5-25 MG capsule, and taking latanoprost from another provider for eyes around the same time and experiencing hives. Would like to discuss how patient will tell which is giving hives. Please call to discuss. Thank  You.    Phone Number Patient can be reached at: Home number on file [phone] (home)    Best Time:     Can we leave a detailed message on this number? YES    Call taken on 2/5/2020 at 10:57 AM by Rebecca Trevino

## 2020-02-05 NOTE — PATIENT INSTRUCTIONS
Hold eye drops until hives are gone then restart.      Keep hydrochlorothiazide at 25 mg daily.        We will recheck in March when you come to see me.

## 2020-02-05 NOTE — PROGRESS NOTES
Subjective     Lindsay Yuen is a 64 year old female who presents to clinic today for the following health issues:    HPI   Rash:  On face, chest and right arm.   Itching and redness present.  New exposures:  Triamterene-hctz  started on 1/30/20, and latanaprost 0.0005% also started 1/30/20.   Rash started a few days ago.  No other new exposures    Took just hydrochlorothiazide this am.    Reports lots of stress and wondering if that is driving up her bp.    Last took eye drops last night.  Today hives are better    No breathing or swallowing issues.          Per RN Triage:   Patient reports, developed hives, lower neck, neck.    Denies facial swelling, tongue swelling, lip swelling, shortness of breath, wheezing, clearing of the throat.   Area is with itch.  Slight hive on inner arm    Areas are spot like, red.  Denies raised areas.   Advise evaluation.  Warning signs and symptoms are reviewed, facial swelling, lip swelling, tongue swelling, wheezing, shortness of breath  and pt should proceed to the ER.   Patient/parent verbalized understanding of instructions provided and agreed with the plan of care  Denise Norris RN         Patient Active Problem List   Diagnosis     Hyperlipidemia with target LDL less than 130     Cervical polyp     Hypertension goal BP (blood pressure) < 140/90     Advanced directives, counseling/discussion     Morbid obesity due to excess calories (H)     Past Surgical History:   Procedure Laterality Date     CHOLECYSTECTOMY, LAPOROSCOPIC              Social History     Tobacco Use     Smoking status: Never Smoker     Smokeless tobacco: Never Used     Tobacco comment: no smokers in the [facility]   Substance Use Topics     Alcohol use: Yes     Comment: occasional     Family History   Problem Relation Age of Onset     Breast Cancer Mother      Hypertension Mother      Lipids Mother      Cancer Father         kidney cancer     Diabetes Paternal Grandfather         adult     Breast Cancer  Sister      Cancer Sister      Heart Disease Maternal Aunt      Cancer Sister         pancreatic         Current Outpatient Medications   Medication Sig Dispense Refill     aspirin 81 MG tablet Take 1 tablet by mouth daily.       CALCIUM 600 + D 600-200 MG-UNIT OR TABS 2 pill a day  3     CENTRUM SILVER OR TABS ONE DAILY 0 0     hydrochlorothiazide (HYDRODIURIL) 25 MG tablet Take 1 tablet (25 mg) by mouth daily 90 tablet 0     latanoprost (XALATAN) 0.005 % ophthalmic solution Place 1 drop Into the left eye At Bedtime       losartan (COZAAR) 100 MG tablet Take 1 tablet (100 mg) by mouth daily 90 tablet 0     VITAMIN E 400 UNIT OR CAPS ONE CAPSULE DAILY       atorvastatin (LIPITOR) 10 MG tablet Take 1 tablet (10 mg) by mouth daily 90 tablet 3     triamterene-HCTZ (DYAZIDE) 37.5-25 MG capsule Take 1 capsule by mouth every morning (Patient not taking: Reported on 2/5/2020) 30 capsule 0     BP Readings from Last 3 Encounters:   02/05/20 (!) 146/66   01/29/20 (!) 148/78   01/15/20 (!) 152/80    Wt Readings from Last 3 Encounters:   02/05/20 98.7 kg (217 lb 9.6 oz)   01/15/20 98 kg (216 lb)   08/20/19 97 kg (213 lb 12.8 oz)                    Reviewed and updated as needed this visit by Provider  Tobacco  Allergies  Meds  Problems  Med Hx  Surg Hx  Fam Hx         Review of Systems   ROS COMP: Constitutional, HEENT, cardiovascular, pulmonary, gi and gu systems are negative, except as otherwise noted.      Objective    BP (!) 146/66   Pulse 90   Temp 98.4  F (36.9  C) (Oral)   Resp 22   Wt 98.7 kg (217 lb 9.6 oz)   BMI 37.35 kg/m    Body mass index is 37.35 kg/m .  Physical Exam   GENERAL: healthy, alert and no distress  EYES: Eyes grossly normal to inspection, PERRL and conjunctivae and sclerae normal  RESP: no wheezing, normal air movement.  MS: no gross musculoskeletal defects noted, no edema  SKIN: scattered wheals on chest/arms/back  PSYCH: mentation appears normal, anxious and fatigued    Diagnostic Test  "Results:  Labs reviewed in Epic        Assessment & Plan     (L50.9) Hives  (primary encounter diagnosis)  Comment: likely due to triamterene  Plan: stop maxide, continue hydrochlorothiazide, for bp, recheck in 1 month at next visit.  Stop eye drops until hives resolve then restart, unlikely the cause of her symptoms     (I10) Hypertension goal BP (blood pressure) < 140/90  Comment: see plan above  Plan: hydrochlorothiazide (HYDRODIURIL) 25 MG tablet        refills x 3 months       BMI:   Estimated body mass index is 37.35 kg/m  as calculated from the following:    Height as of 1/15/20: 1.626 m (5' 4\").    Weight as of this encounter: 98.7 kg (217 lb 9.6 oz).   Weight management plan: Discussed healthy diet and exercise guidelines        Patient Instructions       Hold eye drops until hives are gone then restart.      Keep hydrochlorothiazide at 25 mg daily.        We will recheck in March when you come to see me.            Return in about 4 weeks (around 3/4/2020) for BP Recheck.    Colleen Marroquin MD  [facility]    "

## 2020-02-05 NOTE — NURSING NOTE
"Chief Complaint   Patient presents with     Derm Problem       Initial BP (!) 159/81   Pulse 90   Temp 98.4  F (36.9  C) (Oral)   Resp 22   Wt 98.7 kg (217 lb 9.6 oz)   BMI 37.35 kg/m   Estimated body mass index is 37.35 kg/m  as calculated from the following:    Height as of 1/15/20: 1.626 m (5' 4\").    Weight as of this encounter: 98.7 kg (217 lb 9.6 oz).  Medication Reconciliation: complete  Tariq Mcdermott CMA    "

## 2020-02-19 DIAGNOSIS — E78.5 HYPERLIPIDEMIA WITH TARGET LDL LESS THAN 130: ICD-10-CM

## 2020-02-20 RX ORDER — ATORVASTATIN CALCIUM 10 MG/1
10 TABLET, FILM COATED ORAL DAILY
Qty: 90 TABLET | Refills: 1 | Status: SHIPPED | OUTPATIENT
Start: 2020-02-20 | End: 2020-08-12

## 2020-03-02 NOTE — PATIENT INSTRUCTIONS
Patient Education   Personalized Prevention Plan  You are due for the preventive services outlined below.  Your care team is available to assist you in scheduling these services.  If you have already completed any of these items, please share that information with your care team to update in your medical record.  Health Maintenance Due   Topic Date Due     Zoster (Shingles) Vaccine (1 of 2) 05/15/2004     Annual Wellness Visit  05/15/2019     Pneumococcal Vaccine (1 of 2 - PCV13) 05/15/2019     Basic Metabolic Panel  01/29/2020      continue all medications

## 2020-03-10 ENCOUNTER — OFFICE VISIT (OUTPATIENT)
Dept: FAMILY MEDICINE | Facility: CLINIC | Age: 65
End: 2020-03-10
Payer: COMMERCIAL

## 2020-03-10 VITALS
HEART RATE: 75 BPM | SYSTOLIC BLOOD PRESSURE: 126 MMHG | DIASTOLIC BLOOD PRESSURE: 70 MMHG | WEIGHT: 215 LBS | TEMPERATURE: 98.1 F | RESPIRATION RATE: 20 BRPM | BODY MASS INDEX: 36.9 KG/M2

## 2020-03-10 DIAGNOSIS — I10 HYPERTENSION GOAL BP (BLOOD PRESSURE) < 140/90: ICD-10-CM

## 2020-03-10 DIAGNOSIS — E78.5 HYPERLIPIDEMIA WITH TARGET LDL LESS THAN 130: ICD-10-CM

## 2020-03-10 DIAGNOSIS — Z00.00 ENCOUNTER FOR MEDICARE ANNUAL WELLNESS EXAM: Primary | ICD-10-CM

## 2020-03-10 LAB
ANION GAP SERPL CALCULATED.3IONS-SCNC: 5 MMOL/L (ref 3–14)
BUN SERPL-MCNC: 22 MG/DL (ref 7–30)
CALCIUM SERPL-MCNC: 9.3 MG/DL (ref 8.5–10.1)
CHLORIDE SERPL-SCNC: 106 MMOL/L (ref 94–109)
CHOLEST SERPL-MCNC: 153 MG/DL
CO2 SERPL-SCNC: 28 MMOL/L (ref 20–32)
CREAT SERPL-MCNC: 0.61 MG/DL (ref 0.52–1.04)
GFR SERPL CREATININE-BSD FRML MDRD: >90 ML/MIN/{1.73_M2}
GLUCOSE SERPL-MCNC: 97 MG/DL (ref 70–99)
HDLC SERPL-MCNC: 60 MG/DL
LDLC SERPL CALC-MCNC: 67 MG/DL
NONHDLC SERPL-MCNC: 93 MG/DL
POTASSIUM SERPL-SCNC: 3.5 MMOL/L (ref 3.4–5.3)
SODIUM SERPL-SCNC: 139 MMOL/L (ref 133–144)
TRIGL SERPL-MCNC: 131 MG/DL

## 2020-03-10 PROCEDURE — 36415 COLL VENOUS BLD VENIPUNCTURE: CPT | Performed by: FAMILY MEDICINE

## 2020-03-10 PROCEDURE — 99397 PER PM REEVAL EST PAT 65+ YR: CPT | Performed by: FAMILY MEDICINE

## 2020-03-10 PROCEDURE — 80061 LIPID PANEL: CPT | Performed by: FAMILY MEDICINE

## 2020-03-10 PROCEDURE — 80048 BASIC METABOLIC PNL TOTAL CA: CPT | Performed by: FAMILY MEDICINE

## 2020-03-10 RX ORDER — LOSARTAN POTASSIUM 100 MG/1
100 TABLET ORAL DAILY
Qty: 90 TABLET | Refills: 0 | Status: SHIPPED | OUTPATIENT
Start: 2020-03-10 | End: 2020-06-05

## 2020-03-10 RX ORDER — HYDROCHLOROTHIAZIDE 25 MG/1
25 TABLET ORAL DAILY
Qty: 90 TABLET | Refills: 0 | Status: SHIPPED | OUTPATIENT
Start: 2020-03-10 | End: 2020-09-14

## 2020-03-10 ASSESSMENT — ENCOUNTER SYMPTOMS
WEAKNESS: 0
ABDOMINAL PAIN: 0
SORE THROAT: 0
MYALGIAS: 0
COUGH: 0
SHORTNESS OF BREATH: 0
PALPITATIONS: 0
ARTHRALGIAS: 1
HEMATURIA: 0
DYSURIA: 0
HEMATOCHEZIA: 0
BREAST MASS: 0
JOINT SWELLING: 1
FEVER: 0
FREQUENCY: 0
NERVOUS/ANXIOUS: 0
PARESTHESIAS: 0
NAUSEA: 0
EYE PAIN: 0
HEADACHES: 0
HEARTBURN: 0
CHILLS: 0
DIARRHEA: 0
DIZZINESS: 0
CONSTIPATION: 0

## 2020-03-10 ASSESSMENT — ACTIVITIES OF DAILY LIVING (ADL): CURRENT_FUNCTION: NO ASSISTANCE NEEDED

## 2020-03-10 NOTE — NURSING NOTE
"Chief Complaint   Patient presents with     Wellness Visit       Initial BP (!) 153/87   Pulse 75   Temp 98.1  F (36.7  C) (Oral)   Resp 20   Wt 97.5 kg (215 lb)   BMI 36.90 kg/m   Estimated body mass index is 36.9 kg/m  as calculated from the following:    Height as of 1/15/20: 1.626 m (5' 4\").    Weight as of this encounter: 97.5 kg (215 lb).  Medication Reconciliation: complete  Tariq Mcdermott CMA    "

## 2020-06-05 DIAGNOSIS — I10 HYPERTENSION GOAL BP (BLOOD PRESSURE) < 140/90: ICD-10-CM

## 2020-06-05 RX ORDER — LOSARTAN POTASSIUM 100 MG/1
100 TABLET ORAL DAILY
Qty: 90 TABLET | Refills: 0 | Status: SHIPPED | OUTPATIENT
Start: 2020-06-05 | End: 2020-09-14

## 2020-06-15 ENCOUNTER — ALLIED HEALTH/NURSE VISIT (OUTPATIENT)
Dept: NURSING | Facility: CLINIC | Age: 65
End: 2020-06-15
Payer: COMMERCIAL

## 2020-06-15 DIAGNOSIS — Z23 NEED FOR VACCINATION: Primary | ICD-10-CM

## 2020-06-15 PROCEDURE — 90471 IMMUNIZATION ADMIN: CPT

## 2020-06-15 NOTE — PROGRESS NOTES
Patient consents to receive outdoor care: Yes    Upon arrival, patient instructed to proceed to designated location, place vehicle in park, turn off, and remove keys     If we are unable to safely and ergonomically able to provide care- is the patient able to safely able to get out of car and transfer to a chair? Yes        Patient would like to receive their AVS in person after care is given.    Prior to immunization administration, verified patients identity using patient s name and date of birth. Please see Immunization Activity for additional information.     Screening Questionnaire for Adult Immunization    Are you sick today?   No   Do you have allergies to medications, food, a vaccine component or latex?   No   Have you ever had a serious reaction after receiving a vaccination?   No   Do you have a long-term health problem with heart, lung, kidney, or metabolic disease (e.g., diabetes), asthma, a blood disorder, no spleen, complement component deficiency, a cochlear implant, or a spinal fluid leak?  Are you on long-term aspirin therapy?   No   Do you have cancer, leukemia, HIV/AIDS, or any other immune system problem?   No   Do you have a parent, brother, or sister with an immune system problem?   No   In the past 3 months, have you taken medications that affect  your immune system, such as prednisone, other steroids, or anticancer drugs; drugs for the treatment of rheumatoid arthritis, Crohn s disease, or psoriasis; or have you had radiation treatments?   No   Have you had a seizure, or a brain or other nervous system problem?   No   During the past year, have you received a transfusion of blood or blood    products, or been given immune (gamma) globulin or antiviral drug?   No   For women: Are you pregnant or is there a chance you could become       pregnant during the next month?   No   Have you received any vaccinations in the past 4 weeks?   No     Immunization questionnaire answers were all negative.         Per orders of Dr. Marroquin, injection of Shingrix given by Cecilia Baig CMA. Patient instructed to remain in clinic for 15 minutes afterwards, and to report any adverse reaction to me immediately.       Screening performed by Cecilia Baig CMA on 6/15/2020 at 11:54 AM.

## 2020-07-13 ENCOUNTER — VIRTUAL VISIT (OUTPATIENT)
Dept: FAMILY MEDICINE | Facility: CLINIC | Age: 65
End: 2020-07-13
Payer: COMMERCIAL

## 2020-07-13 DIAGNOSIS — R30.0 DYSURIA: Primary | ICD-10-CM

## 2020-07-13 DIAGNOSIS — R30.0 DYSURIA: ICD-10-CM

## 2020-07-13 LAB
ALBUMIN UR-MCNC: NEGATIVE MG/DL
APPEARANCE UR: CLEAR
BILIRUB UR QL STRIP: NEGATIVE
COLOR UR AUTO: YELLOW
GLUCOSE UR STRIP-MCNC: NEGATIVE MG/DL
HGB UR QL STRIP: NEGATIVE
KETONES UR STRIP-MCNC: NEGATIVE MG/DL
LEUKOCYTE ESTERASE UR QL STRIP: ABNORMAL
NITRATE UR QL: NEGATIVE
NON-SQ EPI CELLS #/AREA URNS LPF: ABNORMAL /LPF
PH UR STRIP: 5.5 PH (ref 5–7)
RBC #/AREA URNS AUTO: ABNORMAL /HPF
SOURCE: ABNORMAL
SP GR UR STRIP: 1.02 (ref 1–1.03)
UROBILINOGEN UR STRIP-ACNC: 0.2 EU/DL (ref 0.2–1)
WBC #/AREA URNS AUTO: ABNORMAL /HPF

## 2020-07-13 PROCEDURE — 99213 OFFICE O/P EST LOW 20 MIN: CPT | Mod: TEL | Performed by: FAMILY MEDICINE

## 2020-07-13 PROCEDURE — 81001 URINALYSIS AUTO W/SCOPE: CPT | Performed by: FAMILY MEDICINE

## 2020-07-13 NOTE — PROGRESS NOTES
"Lindsay Yuen is a 65 year old female who is being evaluated via a billable telephone visit.      The patient has been notified of following:     \"This telephone visit will be conducted via a call between you and your physician/provider. We have found that certain health care needs can be provided without the need for a physical exam.  This service lets us provide the care you need with a short phone conversation.  If a prescription is necessary we can send it directly to your pharmacy.  If lab work is needed we can place an order for that and you can then stop by our lab to have the test done at a later time.    Telephone visits are billed at different rates depending on your insurance coverage. During this emergency period, for some insurers they may be billed the same as an in-person visit.  Please reach out to your insurance provider with any questions.    If during the course of the call the physician/provider feels a telephone visit is not appropriate, you will not be charged for this service.\"    Patient has given verbal consent for Telephone visit?  Yes    What phone number would you like to be contacted at? [phone]    How would you like to obtain your AVS? Mail a copy    Subjective     Lindsay Yuen is a 65 year old female who presents via phone visit today for the following health issues:    HPI  URINARY TRACT SYMPTOMS      Duration: 1 week    Description  Increased urination/pink/ a little at a time     Intensity:  mild    Accompanying signs and symptoms:  Fever/chills: no   Flank pain no   Nausea and vomiting: no   Vaginal symptoms: none  Abdominal/Pelvic Pain: YES- a little     History  History of frequent UTI's: YES- not very many  History of kidney stones: no   Sexually Active: YES  Possibility of pregnancy: No    Precipitating or alleviating factors: None  Therapies tried and outcome: cranberry juice                  Patient Active Problem List   Diagnosis     Hyperlipidemia with target LDL " less than 130     Cervical polyp     Hypertension goal BP (blood pressure) < 140/90     Advanced directives, counseling/discussion     Morbid obesity due to excess calories (H)     Past Surgical History:   Procedure Laterality Date     CHOLECYSTECTOMY, LAPOROSCOPIC              Social History     Tobacco Use     Smoking status: Never Smoker     Smokeless tobacco: Never Used     Tobacco comment: no smokers in the [facility]   Substance Use Topics     Alcohol use: Yes     Comment: occasional     Family History   Problem Relation Age of Onset     Breast Cancer Mother      Hypertension Mother      Lipids Mother      Cancer Father         kidney cancer     Diabetes Paternal Grandfather         adult     Breast Cancer Sister      Cancer Sister      Heart Disease Maternal Aunt      Cancer Sister         pancreatic         Current Outpatient Medications   Medication Sig Dispense Refill     aspirin 81 MG tablet Take 1 tablet by mouth daily.       ATORVASTATIN 10 MG PO tablet Take 1 tablet (10 mg) by mouth daily 90 tablet 1     CALCIUM 600 + D 600-200 MG-UNIT OR TABS 2 pill a day  3     CENTRUM SILVER OR TABS ONE DAILY 0 0     hydrochlorothiazide (HYDRODIURIL) 25 MG tablet Take 1 tablet (25 mg) by mouth daily 90 tablet 0     latanoprost (XALATAN) 0.005 % ophthalmic solution Place 1 drop Into the left eye At Bedtime       losartan (COZAAR) 100 MG tablet Take 1 tablet (100 mg) by mouth daily  90 tablet 0     VITAMIN E 400 UNIT OR CAPS ONE CAPSULE DAILY       Allergies   Allergen Reactions     Triamterene Hives     Recent Labs   Lab Test 03/10/20  1032 07/29/19  0711 01/17/19  1023  09/16/15  1119   LDL 67 79 100*   < > 79   HDL 60 63 62   < > 62   TRIG 131 107 115   < > 79   ALT  --   --   --   --  36   CR 0.61 0.59 0.60   < > 0.59   GFRESTIMATED >90 >90 >90   < > >90  Non  GFR Calc     GFRESTBLACK >90 >90 >90   < > >90  African American GFR Calc     POTASSIUM 3.5 3.8 3.9   < > 3.9    < > = values in this  interval not displayed.      BP Readings from Last 3 Encounters:   03/10/20 126/70   02/05/20 (!) 146/66   01/29/20 (!) 148/78    Wt Readings from Last 3 Encounters:   03/10/20 97.5 kg (215 lb)   02/05/20 98.7 kg (217 lb 9.6 oz)   01/15/20 98 kg (216 lb)                    Reviewed and updated as needed this visit by Provider  Tobacco  Allergies  Meds  Problems  Med Hx  Surg Hx  Fam Hx         Review of Systems   Constitutional, HEENT, cardiovascular, pulmonary, gi and gu systems are negative, except as otherwise noted.       Objective   Reported vitals:  There were no vitals taken for this visit.   healthy, alert and no distress  PSYCH: Alert and oriented times 3; coherent speech, normal   rate and volume, able to articulate logical thoughts, able   to abstract reason, no tangential thoughts, no hallucinations   or delusions  Her affect is normal  RESP: No cough, no audible wheezing, able to talk in full sentences  Remainder of exam unable to be completed due to telephone visits            Assessment/Plan:  1. Dysuria  Patient is concerned about dysuria   Symptoms include frequency , burning sensation   No fever or chills   No back pain   I recommend UA   Will treat with appropriate antibiotic if UA is positive   Encouraged patient to increase water intake   Patient verbalized understanding and agreed on the plan of care. All questions answered.     - UA with Microscopic reflex to Culture; Future    Return if symptoms worsen or fail to improve.      Phone call duration:  5 minutes    Jack Kramer MD

## 2020-09-09 DIAGNOSIS — I10 HYPERTENSION GOAL BP (BLOOD PRESSURE) < 150/90: ICD-10-CM

## 2020-09-09 DIAGNOSIS — I10 HYPERTENSION GOAL BP (BLOOD PRESSURE) < 140/90: ICD-10-CM

## 2020-09-09 LAB
ANION GAP SERPL CALCULATED.3IONS-SCNC: 5 MMOL/L (ref 3–14)
BUN SERPL-MCNC: 23 MG/DL (ref 7–30)
CALCIUM SERPL-MCNC: 9.8 MG/DL (ref 8.5–10.1)
CHLORIDE SERPL-SCNC: 107 MMOL/L (ref 94–109)
CO2 SERPL-SCNC: 29 MMOL/L (ref 20–32)
CREAT SERPL-MCNC: 0.62 MG/DL (ref 0.52–1.04)
GFR SERPL CREATININE-BSD FRML MDRD: >90 ML/MIN/{1.73_M2}
GLUCOSE SERPL-MCNC: 97 MG/DL (ref 70–99)
POTASSIUM SERPL-SCNC: 3.8 MMOL/L (ref 3.4–5.3)
SODIUM SERPL-SCNC: 141 MMOL/L (ref 133–144)

## 2020-09-09 PROCEDURE — 80048 BASIC METABOLIC PNL TOTAL CA: CPT | Performed by: FAMILY MEDICINE

## 2020-09-09 PROCEDURE — 36415 COLL VENOUS BLD VENIPUNCTURE: CPT | Performed by: FAMILY MEDICINE

## 2020-09-10 NOTE — RESULT ENCOUNTER NOTE
Lindsay,  Here are your lab results.  We will discuss these at your upcoming office or telephone visit.   See you soon.  Colleen Marroquin MD

## 2020-09-10 NOTE — PROGRESS NOTES
Subjective     Lindsay Yuen is a 65 year old female who presents to clinic today for the following health issues:    History of Present Illness        Hyperlipidemia:  She presents for follow up of hyperlipidemia.  She is taking medication to lower cholesterol. She is not having myalgia or other side effects to statin medications.    Hypertension: She presents for follow up of hypertension.  She does check blood pressure  regularly outside of the clinic. Outside blood pressures have been over 140/90. (A few elevated readings at home due to stress ) She follows a low salt diet.     She eats 2-3 servings of fruits and vegetables daily.She consumes 1 sweetened beverage(s) daily.She exercises with enough effort to increase her heart rate 20 to 29 minutes per day.  She exercises with enough effort to increase her heart rate 5 days per week.   She is taking medications regularly.       Hypertension ROS: taking medications as instructed, no medication side effects noted, no TIA's, no chest pain on exertion, no dyspnea on exertion, no swelling of ankles.  No headache or visual changes.    Spouse is very ill and has been in ICU    Review of Systems   Constitutional: Negative for chills and fever.   HENT: Negative for ear pain.    Eyes: Negative for visual disturbance.   Cardiovascular: Negative for chest pain.   Psychiatric/Behavioral: Negative for behavioral problems. The patient is nervous/anxious.             Objective    /76   Pulse 71   Temp 98.2  F (36.8  C) (Oral)   Resp 18   Wt 92.6 kg (204 lb 3.2 oz)   SpO2 97%   BMI 35.05 kg/m    Body mass index is 35.05 kg/m .  Physical Exam   GENERAL: healthy, alert and no distress  EYES: Eyes grossly normal to inspection, PERRL and conjunctivae and sclerae normal  HENT: ear canals and TM's normal, nose and mouth without ulcers or lesions  RESP: lungs clear to auscultation - no rales, rhonchi or wheezes  CV: regular rate and rhythm, normal S1 S2, no S3 or S4, no  "murmur, click or rub, no peripheral edema and peripheral pulses strong  MS: no gross musculoskeletal defects noted, no edema  SKIN: no suspicious lesions or rashes  NEURO: Normal strength and tone, mentation intact and speech normal  PSYCH: mentation appears normal, affect normal/bright                    Assessment & Plan     (I10) Hypertension goal BP (blood pressure) < 140/90 (primary encounter diagnosis)  Comment: Stable, continue with current care plan; due to increased stress at home, recommend frequent bp checks at home; patient reports using an at home wrist cuff to check bp;   Plan: hydrochlorothiazide (HYDRODIURIL) 25 MG tablet,        losartan (COZAAR) 100 MG tablet      (Z23) Need for prophylactic vaccination and inoculation against influenza Comment: administered in clinic today  Plan: FLUZONE HIGH DOSE 65+  [41482], ADMIN INFLUENZA        (For MEDICARE Patients ONLY) []             (Z12.11) Screen for colon cancer  Comment: Patient is due for colonoscopy; referral ordered  Plan: GASTROENTEROLOGY ADULT REF PROCEDURE ONLY     BMI:   Estimated body mass index is 35.05 kg/m  as calculated from the following:    Height as of 1/15/20: 1.626 m (5' 4\").    Weight as of this encounter: 92.6 kg (204 lb 3.2 oz).   Weight management plan: Discussed healthy diet and exercise guidelines        Patient Instructions       Continue all medications.      Schedule colonoscopy.            Return in about 6 months (around 3/16/2021) for Wellness Exam, Fasting Lab Work.    Colleen Marroquin MD  [facility]    [facility] [place]  "

## 2020-09-14 RX ORDER — LOSARTAN POTASSIUM 100 MG/1
TABLET ORAL
Qty: 30 TABLET | Refills: 0 | Status: SHIPPED | OUTPATIENT
Start: 2020-09-14 | End: 2020-09-16

## 2020-09-14 RX ORDER — HYDROCHLOROTHIAZIDE 25 MG/1
25 TABLET ORAL DAILY
Qty: 30 TABLET | Refills: 0 | Status: SHIPPED | OUTPATIENT
Start: 2020-09-14 | End: 2020-09-16

## 2020-09-14 NOTE — TELEPHONE ENCOUNTER
Next 5 appointments (look out 90 days)    Sep 16, 2020  8:55 AM CDT  [facility] with Colleen [facility]quin [facility]  [facility] [address] [facility] [address]  [phone]        Prescription approved per [facility] Refill Protocol #30  Need to keep upcoming appointment for further refills  Denise Norris RN

## 2020-09-16 ENCOUNTER — OFFICE VISIT (OUTPATIENT)
Dept: FAMILY MEDICINE | Facility: CLINIC | Age: 65
End: 2020-09-16
Payer: COMMERCIAL

## 2020-09-16 VITALS
WEIGHT: 204.2 LBS | DIASTOLIC BLOOD PRESSURE: 76 MMHG | OXYGEN SATURATION: 97 % | SYSTOLIC BLOOD PRESSURE: 135 MMHG | BODY MASS INDEX: 35.05 KG/M2 | HEART RATE: 71 BPM | TEMPERATURE: 98.2 F | RESPIRATION RATE: 18 BRPM

## 2020-09-16 DIAGNOSIS — Z12.11 SCREEN FOR COLON CANCER: ICD-10-CM

## 2020-09-16 DIAGNOSIS — I10 HYPERTENSION GOAL BP (BLOOD PRESSURE) < 140/90: Primary | ICD-10-CM

## 2020-09-16 DIAGNOSIS — Z23 NEED FOR PROPHYLACTIC VACCINATION AND INOCULATION AGAINST INFLUENZA: ICD-10-CM

## 2020-09-16 PROCEDURE — 90471 IMMUNIZATION ADMIN: CPT | Performed by: FAMILY MEDICINE

## 2020-09-16 PROCEDURE — 90662 IIV NO PRSV INCREASED AG IM: CPT | Performed by: FAMILY MEDICINE

## 2020-09-16 PROCEDURE — 99213 OFFICE O/P EST LOW 20 MIN: CPT | Mod: 25 | Performed by: FAMILY MEDICINE

## 2020-09-16 RX ORDER — HYDROCHLOROTHIAZIDE 25 MG/1
25 TABLET ORAL DAILY
Qty: 90 TABLET | Refills: 1 | Status: SHIPPED | OUTPATIENT
Start: 2020-09-16 | End: 2021-03-15

## 2020-09-16 RX ORDER — LOSARTAN POTASSIUM 100 MG/1
100 TABLET ORAL DAILY
Qty: 90 TABLET | Refills: 1 | Status: SHIPPED | OUTPATIENT
Start: 2020-09-16 | End: 2021-03-15

## 2020-09-16 NOTE — NURSING NOTE
"Chief Complaint   Patient presents with     Hypertension     Lipids       Initial BP (!) 142/74   Pulse 71   Temp 98.2  F (36.8  C) (Oral)   Resp 18   Wt 92.6 kg (204 lb 3.2 oz)   SpO2 97%   BMI 35.05 kg/m   Estimated body mass index is 35.05 kg/m  as calculated from the following:    Height as of 1/15/20: 1.626 m (5' 4\").    Weight as of this encounter: 92.6 kg (204 lb 3.2 oz).  Medication Reconciliation: complete  Tariq Mcdermott CMA    "

## 2020-09-17 ASSESSMENT — ENCOUNTER SYMPTOMS
FEVER: 0
CHILLS: 0
NERVOUS/ANXIOUS: 1

## 2020-10-02 ENCOUNTER — ANCILLARY PROCEDURE (OUTPATIENT)
Dept: MAMMOGRAPHY | Facility: CLINIC | Age: 65
End: 2020-10-02
Attending: FAMILY MEDICINE
Payer: MEDICARE

## 2020-10-02 DIAGNOSIS — Z12.31 VISIT FOR SCREENING MAMMOGRAM: ICD-10-CM

## 2020-10-02 PROCEDURE — 77067 SCR MAMMO BI INCL CAD: CPT | Performed by: RADIOLOGY

## 2020-10-02 PROCEDURE — 77063 BREAST TOMOSYNTHESIS BI: CPT | Performed by: RADIOLOGY

## 2020-10-19 NOTE — PROGRESS NOTES
Subjective     Lindsay Yuen is a 65 year old female who presents to clinic today for the following health issues:    HPI         Concern - Spotting   Onset: Noted symptoms around mid July. Thought it was UTI, urine testing neg for blood, increased fluids and then resolved until 2 wks ago   Description: Spotting off and on, light pink.  Will note spotting on liner daily   Intensity: mild  Progression of Symptoms:  same and intermittent  Accompanying Signs & Symptoms:  was ill and in hospital, large amount of stress related to his care.   recently passed away on 10/10/20- stress increased.  Previous history of similar problem: No history of abnormal vaginal bleeding,   Precipitating factors:        Worsened by: noted with stress increase   Alleviating factors:        Improved by: none   Therapies tried and outcome:  none   Visit in July  - UA neg for blood, + for a few wbc  Poor sleep for several months related to spouse's diagnosis and illness.  Awakens to urinate then cannot return to sleep because mind takes off.    Labs reviewed in EPIC  BP Readings from Last 3 Encounters:   10/20/20 116/68   09/16/20 135/76   03/10/20 126/70    Wt Readings from Last 3 Encounters:   10/20/20 90.7 kg (200 lb)   09/16/20 92.6 kg (204 lb 3.2 oz)   03/10/20 97.5 kg (215 lb)                  Patient Active Problem List   Diagnosis     Hyperlipidemia with target LDL less than 130     Cervical polyp     Hypertension goal BP (blood pressure) < 140/90     Advanced directives, counseling/discussion     Morbid obesity due to excess calories (H)     Past Surgical History:   Procedure Laterality Date     CHOLECYSTECTOMY, LAPOROSCOPIC              Social History     Tobacco Use     Smoking status: Never Smoker     Smokeless tobacco: Never Used     Tobacco comment: no smokers in the [facility]   Substance Use Topics     Alcohol use: Yes     Comment: occasional     Family History   Problem Relation Age of Onset     Breast Cancer  Mother      Hypertension Mother      Lipids Mother      Cancer Father         kidney cancer     Diabetes Paternal Grandfather         adult     Breast Cancer Sister      Cancer Sister      Heart Disease Maternal Aunt      Cancer Sister         pancreatic         Current Outpatient Medications   Medication Sig Dispense Refill     aspirin 81 MG tablet Take 1 tablet by mouth daily.       atorvastatin (LIPITOR) 10 MG tablet Take 1 tablet (10 mg) by mouth daily 90 tablet 1     CALCIUM 600 + D 600-200 MG-UNIT OR TABS 2 pill a day  3     CENTRUM SILVER OR TABS ONE DAILY 0 0     hydrochlorothiazide (HYDRODIURIL) 25 MG tablet Take 1 tablet (25 mg) by mouth daily 90 tablet 1     latanoprost (XALATAN) 0.005 % ophthalmic solution Place 1 drop Into the left eye At Bedtime       losartan (COZAAR) 100 MG tablet Take 1 tablet (100 mg) by mouth daily 90 tablet 1     QUEtiapine (SEROQUEL) 25 MG tablet Take 1 tablet (25 mg) by mouth At Bedtime 30 tablet 0     VITAMIN E 400 UNIT OR CAPS ONE CAPSULE DAILY          Review of Systems   Constitutional, HEENT, cardiovascular, pulmonary, gi and gu systems are negative, except as otherwise noted.      Objective    /68   Pulse 68   Temp 96.7  F (35.9  C) (Tympanic)   Resp 18   Wt 90.7 kg (200 lb)   SpO2 96%   BMI 34.33 kg/m    Body mass index is 34.33 kg/m .  Physical Exam   GENERAL: healthy, alert and no distress  EYES: Eyes grossly normal to inspection, PERRL and conjunctivae and sclerae normal  MS: no gross musculoskeletal defects noted, no edema  SKIN: no suspicious lesions or rashes  PSYCH: mentation appears normal, affect flat and tearful            Assessment & Plan     (N93.9) Vaginal spotting  (primary encounter diagnosis)  Comment: at risk for endometrial hyperplasia due to wt  Plan: *UA reflex to Microscopic and Culture ([facility]         and [facility] (except [place] and         [place]), US Pelvic Complete with Transvaginal,        Urine Microscopic         Uncertain if truly vag bleeding vs calcium ox in urine on pad  Obtain UA today, pelvic US      (E66.01) Morbid obesity due to excess calories (H)  Comment: puts at risk for endometrial hyperplasia  Plan: continue wt loss efforts    (F51.04) Psychophysiological insomnia  (F43.21) Grief  Comment: due to death of spouse 10 days ago  Plan: QUEtiapine (SEROQUEL) 25 MG tablet        Trial of seroquel for sleep,   Monitor for depression.               Patient Instructions       Make appt for ultrasound, Please  call [phone] to schedule.    Make appt for physical in March 2021, call now.        Trial of seroquel for sleep each night. Check with pharmacist if can cut in half.       Return in about 2 weeks (around 11/3/2020) for results.    Colleen Marroquin MD  [facility]

## 2020-10-20 ENCOUNTER — OFFICE VISIT (OUTPATIENT)
Dept: FAMILY MEDICINE | Facility: CLINIC | Age: 65
End: 2020-10-20
Payer: COMMERCIAL

## 2020-10-20 VITALS
DIASTOLIC BLOOD PRESSURE: 68 MMHG | OXYGEN SATURATION: 96 % | RESPIRATION RATE: 18 BRPM | TEMPERATURE: 96.7 F | BODY MASS INDEX: 34.33 KG/M2 | SYSTOLIC BLOOD PRESSURE: 116 MMHG | HEART RATE: 68 BPM | WEIGHT: 200 LBS

## 2020-10-20 DIAGNOSIS — R93.89 ABNORMAL PELVIC ULTRASOUND: ICD-10-CM

## 2020-10-20 DIAGNOSIS — N93.9 VAGINAL SPOTTING: Primary | ICD-10-CM

## 2020-10-20 DIAGNOSIS — F51.04 PSYCHOPHYSIOLOGICAL INSOMNIA: ICD-10-CM

## 2020-10-20 DIAGNOSIS — E66.01 MORBID OBESITY DUE TO EXCESS CALORIES (H): ICD-10-CM

## 2020-10-20 DIAGNOSIS — F43.21 GRIEF: ICD-10-CM

## 2020-10-20 LAB
ALBUMIN UR-MCNC: NEGATIVE MG/DL
APPEARANCE UR: CLEAR
BACTERIA #/AREA URNS HPF: ABNORMAL /HPF
BILIRUB UR QL STRIP: NEGATIVE
COLOR UR AUTO: YELLOW
GLUCOSE UR STRIP-MCNC: NEGATIVE MG/DL
HGB UR QL STRIP: ABNORMAL
KETONES UR STRIP-MCNC: NEGATIVE MG/DL
LEUKOCYTE ESTERASE UR QL STRIP: NEGATIVE
NITRATE UR QL: NEGATIVE
NON-SQ EPI CELLS #/AREA URNS LPF: ABNORMAL /LPF
PH UR STRIP: 6 PH (ref 5–7)
RBC #/AREA URNS AUTO: ABNORMAL /HPF
SOURCE: ABNORMAL
SP GR UR STRIP: 1.02 (ref 1–1.03)
UROBILINOGEN UR STRIP-ACNC: 0.2 EU/DL (ref 0.2–1)
WBC #/AREA URNS AUTO: ABNORMAL /HPF

## 2020-10-20 PROCEDURE — 99214 OFFICE O/P EST MOD 30 MIN: CPT | Performed by: FAMILY MEDICINE

## 2020-10-20 PROCEDURE — 81001 URINALYSIS AUTO W/SCOPE: CPT | Performed by: FAMILY MEDICINE

## 2020-10-20 RX ORDER — QUETIAPINE FUMARATE 25 MG/1
25 TABLET, FILM COATED ORAL AT BEDTIME
Qty: 30 TABLET | Refills: 0 | Status: SHIPPED | OUTPATIENT
Start: 2020-10-20 | End: 2020-10-30

## 2020-10-20 NOTE — NURSING NOTE
"Chief Complaint   Patient presents with     Vaginal Bleeding       Initial /68   Pulse 68   Temp 96.7  F (35.9  C) (Tympanic)   Resp 18   Wt 90.7 kg (200 lb)   SpO2 96%   BMI 34.33 kg/m   Estimated body mass index is 34.33 kg/m  as calculated from the following:    Height as of 1/15/20: 1.626 m (5' 4\").    Weight as of this encounter: 90.7 kg (200 lb).  Medication Reconciliation: complete  Tariq Mcdermott CMA    "

## 2020-10-20 NOTE — PATIENT INSTRUCTIONS
Make appt for ultrasound, Please  call [phone] to schedule.    Make appt for physical in March 2021, call now.        Trial of seroquel for sleep each night. Check with pharmacist if can cut in half.

## 2020-10-22 ENCOUNTER — ANCILLARY PROCEDURE (OUTPATIENT)
Dept: ULTRASOUND IMAGING | Facility: CLINIC | Age: 65
End: 2020-10-22
Attending: FAMILY MEDICINE
Payer: COMMERCIAL

## 2020-10-22 DIAGNOSIS — N93.9 VAGINAL SPOTTING: ICD-10-CM

## 2020-10-23 ENCOUNTER — TELEPHONE (OUTPATIENT)
Dept: FAMILY MEDICINE | Facility: CLINIC | Age: 65
End: 2020-10-23

## 2020-10-26 ENCOUNTER — TELEPHONE (OUTPATIENT)
Dept: OBGYN | Facility: CLINIC | Age: 65
End: 2020-10-26

## 2020-10-26 NOTE — TELEPHONE ENCOUNTER
Lindsay,   Your ultrasound shows some thickening of the uterine lining and you need to have a biopsy done.   I recommend a consult with gynecology so they can complete the biopsy.   I have submitted a consult for you.  Please call [phone] to make an appointment.   Please let me know if you have questions,   Colleen Marroquin MD      Patient informed of result note. Patient verbalized understanding and given scheduling number    Radha AYALAN, RN, CPN

## 2020-10-26 NOTE — TELEPHONE ENCOUNTER
Called and spoke with pt.  Pt would like to do biopsy same day as consult.    Roxy Yeung CMA 10/26/2020 9:36 AM

## 2020-10-26 NOTE — TELEPHONE ENCOUNTER
Reason for call:  Other   Patient called regarding (reason for call): call back  Additional comments: Patient is calling because she set up an appt for a consult for a biopsy but wants to know if she should be scheduling the biopsy as well. Please call back     Phone number to reach patient:  Cell number on file:    Telephone Information:   Mobile [phone]       Best Time:  any    Can we leave a detailed message on this number?  YES    Travel screening: Not Applicable

## 2020-10-30 ENCOUNTER — VIRTUAL VISIT (OUTPATIENT)
Dept: FAMILY MEDICINE | Facility: CLINIC | Age: 65
End: 2020-10-30
Payer: COMMERCIAL

## 2020-10-30 ENCOUNTER — TELEPHONE (OUTPATIENT)
Dept: OBGYN | Facility: CLINIC | Age: 65
End: 2020-10-30

## 2020-10-30 ENCOUNTER — NURSE TRIAGE (OUTPATIENT)
Dept: FAMILY MEDICINE | Facility: CLINIC | Age: 65
End: 2020-10-30

## 2020-10-30 DIAGNOSIS — F51.02 ADJUSTMENT INSOMNIA: Primary | ICD-10-CM

## 2020-10-30 DIAGNOSIS — F43.21 GRIEF REACTION: ICD-10-CM

## 2020-10-30 PROCEDURE — 99214 OFFICE O/P EST MOD 30 MIN: CPT | Mod: 95 | Performed by: FAMILY MEDICINE

## 2020-10-30 PROCEDURE — 96127 BRIEF EMOTIONAL/BEHAV ASSMT: CPT | Mod: 95 | Performed by: FAMILY MEDICINE

## 2020-10-30 RX ORDER — CITALOPRAM HYDROBROMIDE 20 MG/1
20 TABLET ORAL DAILY
Qty: 30 TABLET | Refills: 3 | Status: SHIPPED | OUTPATIENT
Start: 2020-10-30 | End: 2020-12-21 | Stop reason: ALTCHOICE

## 2020-10-30 RX ORDER — HYDROXYZINE HYDROCHLORIDE 25 MG/1
25 TABLET, FILM COATED ORAL
Qty: 30 TABLET | Refills: 3 | Status: SHIPPED | OUTPATIENT
Start: 2020-10-30 | End: 2020-11-02

## 2020-10-30 ASSESSMENT — ANXIETY QUESTIONNAIRES
5. BEING SO RESTLESS THAT IT IS HARD TO SIT STILL: NEARLY EVERY DAY
GAD7 TOTAL SCORE: 14
6. BECOMING EASILY ANNOYED OR IRRITABLE: NOT AT ALL
3. WORRYING TOO MUCH ABOUT DIFFERENT THINGS: NEARLY EVERY DAY
IF YOU CHECKED OFF ANY PROBLEMS ON THIS QUESTIONNAIRE, HOW DIFFICULT HAVE THESE PROBLEMS MADE IT FOR YOU TO DO YOUR WORK, TAKE CARE OF THINGS AT HOME, OR GET ALONG WITH OTHER PEOPLE: SOMEWHAT DIFFICULT
2. NOT BEING ABLE TO STOP OR CONTROL WORRYING: NEARLY EVERY DAY
1. FEELING NERVOUS, ANXIOUS, OR ON EDGE: NEARLY EVERY DAY
7. FEELING AFRAID AS IF SOMETHING AWFUL MIGHT HAPPEN: MORE THAN HALF THE DAYS

## 2020-10-30 ASSESSMENT — PATIENT HEALTH QUESTIONNAIRE - PHQ9
SUM OF ALL RESPONSES TO PHQ QUESTIONS 1-9: 16
5. POOR APPETITE OR OVEREATING: NOT AT ALL

## 2020-10-30 NOTE — TELEPHONE ENCOUNTER
Spoke with patient and rescheduled her for 11/10/2020 with  in [place]. She would like to have consult and EMB on same day.  Melania Bryant CMA

## 2020-10-30 NOTE — TELEPHONE ENCOUNTER
Reason for Call:  Other call back    Detailed comments: pt mental health regarding 's passing and needing biopsy    Phone Number Patient can be reached at: Home number on file [phone] (home)    Best Time: any    Can we leave a detailed message on this number? YES    Call taken on 10/30/2020 at 9:58 AM by Sidra Wilcox

## 2020-10-30 NOTE — TELEPHONE ENCOUNTER
RN returned call to pt (See Initial Assessment Question section for pt detailed responses).  Pt denies previous diagnosed depression or anxiety prior death of spouse.  Pt states she is unable to take the Seroquel prescribed at last office visit with PCP due to dizziness that persists throughout the day after using at .    Pt states she feels she needs to start medication before her sx worsen any further. She states sx are affecting her desire to perform ADLs, and sx gradually worsening instead of improving since spouse's death, and news of abnormal US result.    PCP is not in clinic today and no appointments remain at [facility]. Pt request a Phone Visit to discuss her sx with any available provider until she can follow-up with PCP and obtain counseling appointments.    Pt offered Phone Visit with PCP on Monday.   Pt accepted 11/2/20 visit at 2:40pm with PCP, but also requests a phone visit with any available provider today too.   Pt would like to keep the 11/2/20 appointment with PCP for follow-up from today's phone visit with another MD.  Pt scheduled for Phone Visit with Dr. Villaseñor at 1pm today per pt request. Pt indicates understanding of issues and agrees with the plan.    DALJIT Diana, RN      Additional Information    Recent death of a loved one    Negative: Patient attempted suicide    Negative: Patient is threatening suicide now    Negative: Violent behavior, or threatening to physically hurt or kill someone    Negative: Patient is very confused (disoriented, slurred speech) and no other adult (e.g., friend or family member) available    Negative: Difficult to awaken or acting very confused (disoriented, slurred speech) and new onset    Negative: Sounds like a life-threatening emergency to the triager    Negative: Alcohol use, abuse or dependence, question or problem related to    Negative: Drug abuse or dependence, question or problem related to    Negative: Suicide thoughts, threats, attempts, or  "questions    Negative: Anxiety is main problem or symptom    Negative: Depression and unable to do any of normal activities (e.g., self care, school, work; in comparison to baseline).    Negative: Very strange or confused behavior    Negative: Patient sounds very sick or weak to the triager    Negative: Fever > 101 F (38.3 C)    Negative: Sometimes has thoughts of suicide    Depression is worsening (e.g.,sleeping poorly, less able to do activities of daily living)    Requesting to talk with a counselor (mental health worker, psychiatrist, etc.)    Answer Assessment - Initial Assessment Questions  1. CONCERN: \"What happened that made you call today?\"      \"My  passed away about 3 weeks ago and about a week ago I was informed I need a biopsy following an abnormal ultrasound result.\"    Pt states the medication prescribed causes dizziness so she isn't using it.    2. DEPRESSION SYMPTOM SCREENING: \"How are you feeling overall?\" (e.g., decreased energy, increased sleeping or difficulty sleeping, difficulty concentrating, feelings of sadness, guilt, hopelessness, or worthlessness)      \"Just anxious, scared, nervous, worried about what is going to happen, mourning death of  and worried about my health too.\"    3. RISK OF HARM - SUICIDAL IDEATION:  \"Do you ever have thoughts of hurting or killing yourself?\"  (e.g., yes, no, no but preoccupation with thoughts about death)    - INTENT:  \"Do you have thoughts of hurting or killing yourself right NOW?\" (e.g., yes, no, N/A)    - PLAN: \"Do you have a specific plan for how you would do this?\" (e.g., gun, knife, overdose, no plan, N/A)      Denies thoughts of self harm at this time, but is concerned if sx not addressed sx may progress.    4. RISK OF HARM - HOMICIDAL IDEATION:  \"Do you ever have thoughts of hurting or killing someone else?\"  (e.g., yes, no, no but preoccupation with thoughts about death)    - INTENT:  \"Do you have thoughts of hurting or killing " "someone right NOW?\" (e.g., yes, no, N/A)    - PLAN: \"Do you have a specific plan for how you would do this?\" (e.g., gun, knife, no plan, N/A)       Denies    5. FUNCTIONAL IMPAIRMENT: \"How have things been going for you overall in your life? Have you had any more difficulties than usual doing your normal daily activities?\"  (e.g., better, same, worse; self-care, school, work, interactions)      Retired in June. Pt states she is still completing ADLs, but feels day by day she has less desire or motivation to accomplish tasks.    6. SUPPORT: \"Who is with you now?\" \"Who do you live with?\" \"Do you have family or friends nearby who you can talk to?\"       Pt's daughter and son come over once in a while. Family and friends offer support by phone due to COVID. Pt states, \"sometimes I find myself cutting the conversation short.\"    7. THERAPIST: \"Do you have a counselor or therapist? Name?\"      Pt states she cannot get in to see a therapist until November and did not schedule it yet. Pt encouraged to schedule next available appointment.    8. STRESSORS: \"Has there been any new stress or recent changes in your life?\"      See above    9. DRUG ABUSE/ALCOHOL: \"Do you drink alcohol or use any illegal drugs?\"       Rare alcohol use.    10. OTHER: \"Do you have any other health or medical symptoms right now?\" (e.g., fever)        Otherwise medically well.    Protocols used: DEPRESSION-A-OH    "

## 2020-10-30 NOTE — PROGRESS NOTES
"Lindsay Yuen is a 65 year old female who is being evaluated via a billable telephone visit.      The patient has been notified of following:     \"This telephone visit will be conducted via a call between you and your physician/provider. We have found that certain health care needs can be provided without the need for a physical exam.  This service lets us provide the care you need with a short phone conversation.  If a prescription is necessary we can send it directly to your pharmacy.  If lab work is needed we can place an order for that and you can then stop by our lab to have the test done at a later time.    Telephone visits are billed at different rates depending on your insurance coverage. During this emergency period, for some insurers they may be billed the same as an in-person visit.  Please reach out to your insurance provider with any questions.    If during the course of the call the physician/provider feels a telephone visit is not appropriate, you will not be charged for this service.\"    Patient has given verbal consent for Telephone visit?  Yes    What phone number would you like to be contacted at? [phone]    How would you like to obtain your AVS? Mail a copy    Subjective     Lindsay Yuen is a 65 year old female who presents via phone visit today for the following health issues:    HPI     Abnormal Mood Symptoms  Onset/Duration: couple weeks  Description: grief  Depression (if yes, do PHQ-9): YES  Anxiety (if yes, do KOTA-7): YES  Accompanying Signs & Symptoms:  Still participating in activities that you used to enjoy: no  Fatigue: no  Irritability: no  Difficulty concentrating: YES  Changes in appetite: YES  Problems with sleep: YES  Heart racing/beating fast: YES  Abnormally elevated, expansive, or irritable mood: no  Persistently increased activity or energy: no  Thoughts of hurting yourself or others: no  History:  Recent stress or major life event: YES  Prior depression or anxiety: " None  Family history of depression or anxiety: no  Alcohol/drug use: no  Difficulty sleeping: YES  Precipitating or alleviating factors: None  Therapies tried and outcome: none    PHQ 10/30/2020   PHQ-9 Total Score 16   Q9: Thoughts of better off dead/self-harm past 2 weeks Not at all     KOTA-7 SCORE 10/30/2020   Total Score 14       Review of Systems   Constitutional, HEENT, cardiovascular, pulmonary, GI, , musculoskeletal, neuro, skin, endocrine and psych systems are negative, except as otherwise noted.       Objective          Vitals:  No vitals were obtained today due to virtual visit.    healthy, alert and no distress  PSYCH: Alert and oriented times 3; coherent speech, normal   rate and volume, able to articulate logical thoughts, able   to abstract reason, no tangential thoughts, no hallucinations   or delusions  Her affect is normal  RESP: No cough, no audible wheezing, able to talk in full sentences  Remainder of exam unable to be completed due to telephone visits        Assessment/Plan:    Assessment & Plan     Grief reaction  Patient recently loss her  3 weeks ago. Patient feeling depressed. She has a good family network but only by telephone due to COVID-19. Will start patient on citalopram 20 mg daily. Patient will see PCP next Monday for follow up.  - citalopram (CELEXA) 20 MG tablet; Take 1 tablet (20 mg) by mouth daily    Adjustment insomnia  Having difficulties falling asleep. Patient given hydroxyzine to hopefully help. Seroquel caused dizziness.  - hydrOXYzine (ATARAX) 25 MG tablet; Take 1 tablet (25 mg) by mouth nightly as needed (sleep)        Depression Screening Follow Up    PHQ 10/30/2020   PHQ-9 Total Score 16   Q9: Thoughts of better off dead/self-harm past 2 weeks Not at all     Last PHQ-9 10/30/2020   1.  Little interest or pleasure in doing things 2   2.  Feeling down, depressed, or hopeless 2   3.  Trouble falling or staying asleep, or sleeping too much 3   4.  Feeling tired or  having little energy 3   5.  Poor appetite or overeating 3   6.  Feeling bad about yourself 0   7.  Trouble concentrating 2   8.  Moving slowly or restless 1   Q9: Thoughts of better off dead/self-harm past 2 weeks 0   PHQ-9 Total Score 16   Difficulty at work, home, or with people Somewhat difficult         See Patient Instructions    Return in about 3 days (around 11/2/2020).    Rad Villaseñor MD, MD  [facility]    Phone call duration:  11 minutes

## 2020-10-31 ASSESSMENT — ANXIETY QUESTIONNAIRES: GAD7 TOTAL SCORE: 14

## 2020-11-02 ENCOUNTER — VIRTUAL VISIT (OUTPATIENT)
Dept: FAMILY MEDICINE | Facility: CLINIC | Age: 65
End: 2020-11-02
Payer: COMMERCIAL

## 2020-11-02 ENCOUNTER — TELEPHONE (OUTPATIENT)
Dept: FAMILY MEDICINE | Facility: CLINIC | Age: 65
End: 2020-11-02

## 2020-11-02 DIAGNOSIS — F43.21 GRIEF REACTION: Primary | ICD-10-CM

## 2020-11-02 DIAGNOSIS — F51.02 ADJUSTMENT INSOMNIA: ICD-10-CM

## 2020-11-02 PROCEDURE — 99214 OFFICE O/P EST MOD 30 MIN: CPT | Mod: TEL | Performed by: FAMILY MEDICINE

## 2020-11-02 RX ORDER — HYDROXYZINE HYDROCHLORIDE 10 MG/1
10 TABLET, FILM COATED ORAL
Qty: 30 TABLET | Refills: 3 | Status: SHIPPED | OUTPATIENT
Start: 2020-11-02 | End: 2020-12-21 | Stop reason: DRUGHIGH

## 2020-11-02 NOTE — TELEPHONE ENCOUNTER
Plan does not cover hydrOXYzine (ATARAX) 25 MG tablet.  Please go to coverU [email or web address] to initiate Prior Auth or change med.    Insurance type and ID number: Key:  [id number]      Additional Information:     Kanchan Medina

## 2020-11-02 NOTE — PROGRESS NOTES
"Lindsay Yuen is a 65 year old female who is being evaluated via a billable telephone visit.      The patient has been notified of following:     \"This telephone visit will be conducted via a call between you and your physician/provider. We have found that certain health care needs can be provided without the need for a physical exam.  This service lets us provide the care you need with a short phone conversation.  If a prescription is necessary we can send it directly to your pharmacy.  If lab work is needed we can place an order for that and you can then stop by our lab to have the test done at a later time.    Telephone visits are billed at different rates depending on your insurance coverage. During this emergency period, for some insurers they may be billed the same as an in-person visit.  Please reach out to your insurance provider with any questions.    If during the course of the call the physician/provider feels a telephone visit is not appropriate, you will not be charged for this service.\"    Patient has given verbal consent for Telephone visit?  Yes    What phone number would you like to be contacted at? [phone]    How would you like to obtain your AVS? MyChart    Subjective     Lindsay Yuen is a 65 year old female who presents via phone visit today for the following health issues:    HPI     Medication Followup of Citalopram     Taking Medication as prescribed: NO-Has not started citalopram due to possible side effects and hydroxyzine was not covered at 25 mg dose.    Side Effects:  n/a    Medication Helping Symptoms:  Has not noted significant changes in symptoms since virtual visit last week.  Would like to discuss medication with provider.   Has been taking melatonin OTC x 3 days and notes no improvement in sleep.  Though taking it just prior to sleep.     Spotting reduced, to almost stopped.  Has appt with gyn 11/10/2020 for biopsy      Labs reviewed in EPIC  BP Readings from Last 3 " Encounters:   10/20/20 116/68   09/16/20 135/76   03/10/20 126/70    Wt Readings from Last 3 Encounters:   10/20/20 90.7 kg (200 lb)   09/16/20 92.6 kg (204 lb 3.2 oz)   03/10/20 97.5 kg (215 lb)                  Patient Active Problem List   Diagnosis     Hyperlipidemia with target LDL less than 130     Cervical polyp     Hypertension goal BP (blood pressure) < 140/90     Advanced directives, counseling/discussion     Morbid obesity due to excess calories (H)     Past Surgical History:   Procedure Laterality Date     CHOLECYSTECTOMY, LAPOROSCOPIC              Social History     Tobacco Use     Smoking status: Never Smoker     Smokeless tobacco: Never Used     Tobacco comment: no smokers in the [facility]   Substance Use Topics     Alcohol use: Yes     Comment: occasional     Family History   Problem Relation Age of Onset     Breast Cancer Mother      Hypertension Mother      Lipids Mother      Cancer Father         kidney cancer     Diabetes Paternal Grandfather         adult     Breast Cancer Sister      Cancer Sister      Heart Disease Maternal Aunt      Cancer Sister         pancreatic         Current Outpatient Medications   Medication Sig Dispense Refill     aspirin 81 MG tablet Take 1 tablet by mouth daily.       atorvastatin (LIPITOR) 10 MG tablet Take 1 tablet (10 mg) by mouth daily 90 tablet 1     CALCIUM 600 + D 600-200 MG-UNIT OR TABS 2 pill a day  3     CENTRUM SILVER OR TABS ONE DAILY 0 0     citalopram (CELEXA) 20 MG tablet Take 1 tablet (20 mg) by mouth daily 30 tablet 3     hydrochlorothiazide (HYDRODIURIL) 25 MG tablet Take 1 tablet (25 mg) by mouth daily 90 tablet 1     hydrOXYzine (ATARAX) 10 MG tablet Take 1 tablet (10 mg) by mouth nightly as needed (insomnia) 30 tablet 3     latanoprost (XALATAN) 0.005 % ophthalmic solution Place 1 drop Into the left eye At Bedtime       losartan (COZAAR) 100 MG tablet Take 1 tablet (100 mg) by mouth daily 90 tablet 1     MELATONIN PO Take by mouth At Bedtime        VITAMIN E 400 UNIT OR CAPS ONE CAPSULE DAILY               Review of Systems   Constitutional, HEENT, cardiovascular, pulmonary, gi and gu systems are negative, except as otherwise noted.       Objective          Vitals:  No vitals were obtained today due to virtual visit.    healthy, alert and no distress  PSYCH: Alert and oriented times 3; coherent speech, normal   rate and volume, able to articulate logical thoughts, able   to abstract reason, no tangential thoughts, no hallucinations   or delusions  Her affect is anxious and sad  RESP: No cough, no audible wheezing, able to talk in full sentences  Remainder of exam unable to be completed due to telephone visits            Assessment/Plan:    Assessment & Plan     (F43.21) Grief reaction  (primary encounter diagnosis)  Comment: more family  Members ill and increased stress  Plan: MENTAL HEALTH REFERRAL  - Adult; Outpatient         Treatment; Individual/Couples/Family/Group         Therapy/Health Psychology; [facility] [place] [facility] [phone]; We will         contact you to schedule the appointment or         please call with any questions        Refer for counseling, start citalopram at 1/2 tab daily for 6 days then up to full tab  Hydroxyzine sent at 10 mg dose to see if better coverage or cash price is low.      (F51.02) Adjustment insomnia  Comment: see above plan  Plan: hydrOXYzine (ATARAX) 10 MG tablet                   Patient Instructions       Start citalopram 10 mg (1/2 tablet) daily for 6 days then increase to full tablet daily.      Trial of melatonin 3-4 hours prior to when you want to be asleep. If that does not help, trial of hydroxyzine 10 mg, 30 minutes before bed.        Schedule appointment with a counselor to help talk through stressors.      Follow up with Dr. Molina as scheduled for the biopsy.       Return in about 4 weeks (around 11/30/2020) for telephone visit.    Colleen Marroquin MD  [facility]  [place]    Phone call duration:  14 minutes

## 2020-11-02 NOTE — PATIENT INSTRUCTIONS
Start citalopram 10 mg (1/2 tablet) daily for 6 days then increase to full tablet daily.      Trial of melatonin 3-4 hours prior to when you want to be asleep. If that does not help, trial of hydroxyzine 10 mg, 30 minutes before bed.        Schedule appointment with a counselor to help talk through stressors.      Follow up with Dr. Molina as scheduled for the biopsy.

## 2020-11-03 ENCOUNTER — TELEPHONE (OUTPATIENT)
Dept: FAMILY MEDICINE | Facility: CLINIC | Age: 65
End: 2020-11-03

## 2020-11-03 NOTE — TELEPHONE ENCOUNTER
Called and spoke with patient. She has not picked this up yet. I informed her that her insurance will not pay for it, and she will have to pay out of pocket for it. She will check with the pharmacy to see how much it will cost.Ute Carter MA/NISH

## 2020-11-03 NOTE — TELEPHONE ENCOUNTER
PA Initiation    Medication: hydrOXYzine (ATARAX) 10 MG tablet   Insurance Company: [employer] - Phone [phone] Fax [phone]  Pharmacy Filling the Rx: [id number] [address] [facility]  Filling Pharmacy Phone: [phone]  Filling Pharmacy Fax: [phone]  Start Date: 11/3/2020

## 2020-11-03 NOTE — TELEPHONE ENCOUNTER
PRIOR AUTHORIZATION DENIED    Medication: hydrOXYzine (ATARAX) 10 MG tablet--DENIED    Denial Date: 11/3/2020    Denial Rational: Patient's diagnosis is not an approved diagnosis for the medication.     Appeal Information:

## 2020-11-03 NOTE — TELEPHONE ENCOUNTER
Sent hydroxyzine 10 mg tabs, pt will notify me if not covered or too expensive to pay cash.    See telephone visit 11/2/2020

## 2020-11-03 NOTE — TELEPHONE ENCOUNTER
Prior Authorization Retail Medication Request    Medication/Dose:hydrOXYzine (ATARAX) 10 MG tablet   ICD code (if different than what is on RX):  Adjustment insomnia [F51.02]   Previously Tried and Failed:    Rationale:      Covermymeds Key:[id number]

## 2020-11-08 ENCOUNTER — NURSE TRIAGE (OUTPATIENT)
Dept: NURSING | Facility: CLINIC | Age: 65
End: 2020-11-08

## 2020-11-08 NOTE — TELEPHONE ENCOUNTER
S: Anxiety and lack of sleep.  B: Patient wondering what she can do to help with her anxiety and lack of sleep.    On 10/10/2020 he  suddenly passed away.      dog is dying of cancer.     On 11/10 she is have a uterine biopsy due to thickening of uterine wall lining.     11/4 was at [facility] ED    A: Paged on call provider Dr. WILDA Harper.  R: Have patient do a video visit with Dr. Lopez REBOLLAR . Writer called patient back and then transferred to scheduling.  Tere Ferro RN MAN   Triage Nurse Advisor [facility]    Additional Information    Negative: Severe difficulty breathing (e.g., struggling for each breath, speaks in single words)    Negative: Bluish (or gray) lips or face now    Negative: Difficult to awaken or acting confused (e.g., disoriented, slurred speech)    Negative: Hysterical or combative behavior    Negative: Sounds like a life-threatening emergency to the triager    Negative: [1] Difficulty breathing AND [2] persists > 10 minutes AND [3] not relieved by reassurance provided by triager    Negative: [1] Lightheadedness or dizziness AND [2] persists > 10 minutes AND [3] not relieved by reassurance provided by triager    Negative: [1] Alcohol or drug abuse, known or suspected AND [2] feeling very shaky (i.e., visible tremors of hands)    Negative: Patient sounds very sick or weak to the triager    Negative: Symptoms interfere with work or school    Negative: Requesting to talk to a counselor (e.g., mental health worker, psychiatrist)    Negative: Patient sounds very upset or troubled to the triager    Negative: [1] Symptoms of anxiety or panic AND [2] has not been evaluated for this by physician    [1] Started on anti-anxiety medication AND [2] no relief    Protocols used: ANXIETY AND PANIC ATTACK-A-AH    COVID 19 Nurse Triage Plan/Patient Instructions    Please be aware that novel coronavirus (COVID-19) may be circulating in the community. If you develop symptoms such as fever,  cough, or SOB or if you have concerns about the presence of another infection including coronavirus (COVID-19), please contact your health care provider or visit [email or web address].     Disposition/Instructions    Virtual Visit with provider recommended. Reference Visit Selection Guide.    Thank you for taking steps to prevent the spread of this virus.  o Limit your contact with others.  o Wear a simple mask to cover your cough.  o Wash your hands well and often.    Resources    [facility]: About COVID-19: [email or web address]    CDC: What to Do If You're Sick: www.cdc.gov/coronavirus/2019-ncov/about/steps-when-sick.html    CDC: Ending Home Isolation: www.cdc.gov/coronavirus/2019-ncov/hcp/disposition-in-home-patients.html     CDC: Caring for Someone: www.cdc.gov/coronavirus/2019-ncov/if-you-are-sick/care-for-someone.html     [facility]: Interim Guidance for Hospital Discharge to Home: www.[facility].[place]./diseases/coronavirus/hcp/hospdischarge.pdf    [facility] clinical trials (COVID-19 research studies): clinicalaffairs.[facility]-clinical-trials     Below are the COVID-19 hotlines at the [place] Department of Health ([facility]). Interpreters are available.   o For health questions: Call 355-674-7221 or 1-521.613.5327 (7 a.m. to 7 p.m.)  o For questions about schools and childcare: Call 543-348-1329 or [phone] (7 a.m. to 7 p.m.)                 '

## 2020-11-09 ENCOUNTER — VIRTUAL VISIT (OUTPATIENT)
Dept: FAMILY MEDICINE | Facility: CLINIC | Age: 65
End: 2020-11-09
Payer: COMMERCIAL

## 2020-11-09 ENCOUNTER — TELEPHONE (OUTPATIENT)
Dept: FAMILY MEDICINE | Facility: CLINIC | Age: 65
End: 2020-11-09

## 2020-11-09 DIAGNOSIS — F41.9 ANXIETY: Primary | ICD-10-CM

## 2020-11-09 PROCEDURE — 99214 OFFICE O/P EST MOD 30 MIN: CPT | Mod: TEL | Performed by: FAMILY MEDICINE

## 2020-11-09 RX ORDER — LORAZEPAM 0.5 MG/1
0.5 TABLET ORAL 2 TIMES DAILY PRN
Qty: 30 TABLET | Refills: 1 | Status: SHIPPED | OUTPATIENT
Start: 2020-11-09 | End: 2020-12-21 | Stop reason: ALTCHOICE

## 2020-11-09 NOTE — PROGRESS NOTES
"Lindsay Yuen is a 65 year old female who is being evaluated via a billable telephone visit.      The patient has been notified of following:     \"This telephone visit will be conducted via a call between you and your physician/provider. We have found that certain health care needs can be provided without the need for a physical exam.  This service lets us provide the care you need with a short phone conversation.  If a prescription is necessary we can send it directly to your pharmacy.  If lab work is needed we can place an order for that and you can then stop by our lab to have the test done at a later time.    Telephone visits are billed at different rates depending on your insurance coverage. During this emergency period, for some insurers they may be billed the same as an in-person visit.  Please reach out to your insurance provider with any questions.    If during the course of the call the physician/provider feels a telephone visit is not appropriate, you will not be charged for this service.\"    Patient has given verbal consent for Telephone visit?  Yes    What phone number would you like to be contacted at? [phone]    How would you like to obtain your AVS? Hanh Hawthorne     Lindsay Yuen is a 65 year old female who presents via phone visit today for the following health issues:    HPI     ED/UC Followup:    Facility:  [facility]   Date of visit: 11/4/20 and 11/8/20  Reason for visit: Stress reaction   Current Status: still noting anxiety      Having significant anxiety, been to ED twice.  Currently staying with her daughter because she doesn't want to be alone on sleep meds.  Her dog is old and dying and cannot make it through the night without going out.      Pt did use 25 mg hydroxyzine that helped her sleep but had to wake to care for dog.  Given ativan while in ED and that helped to calm her.  Has appt for intake at [facility] this week.    Feels her daughter is angry at " her because she is so anxious and not handling this well though pt has more stressors from dog and she has thickened endometrial lining - having biopsy this week as well.    Has been taking 20 mg citalopram for about 1 week, not noting a difference yet.    Labs reviewed in EPIC  BP Readings from Last 3 Encounters:   10/20/20 116/68   09/16/20 135/76   03/10/20 126/70    Wt Readings from Last 3 Encounters:   10/20/20 90.7 kg (200 lb)   09/16/20 92.6 kg (204 lb 3.2 oz)   03/10/20 97.5 kg (215 lb)                  Patient Active Problem List   Diagnosis     Hyperlipidemia with target LDL less than 130     Cervical polyp     Hypertension goal BP (blood pressure) < 140/90     Advanced directives, counseling/discussion     Morbid obesity due to excess calories (H)     Past Surgical History:   Procedure Laterality Date     CHOLECYSTECTOMY, LAPOROSCOPIC              Social History     Tobacco Use     Smoking status: Never Smoker     Smokeless tobacco: Never Used     Tobacco comment: no smokers in the [facility]   Substance Use Topics     Alcohol use: Yes     Comment: occasional     Family History   Problem Relation Age of Onset     Breast Cancer Mother      Hypertension Mother      Lipids Mother      Cancer Father         kidney cancer     Diabetes Paternal Grandfather         adult     Breast Cancer Sister      Cancer Sister      Heart Disease Maternal Aunt      Cancer Sister         pancreatic         Current Outpatient Medications   Medication Sig Dispense Refill     aspirin 81 MG tablet Take 1 tablet by mouth daily.       atorvastatin (LIPITOR) 10 MG tablet Take 1 tablet (10 mg) by mouth daily 90 tablet 1     CALCIUM 600 + D 600-200 MG-UNIT OR TABS 2 pill a day  3     CENTRUM SILVER OR TABS ONE DAILY 0 0     citalopram (CELEXA) 20 MG tablet Take 1 tablet (20 mg) by mouth daily 30 tablet 3     hydrochlorothiazide (HYDRODIURIL) 25 MG tablet Take 1 tablet (25 mg) by mouth daily 90 tablet 1     hydrOXYzine (ATARAX) 10 MG  tablet Take 1 tablet (10 mg) by mouth nightly as needed (insomnia) 30 tablet 3     latanoprost (XALATAN) 0.005 % ophthalmic solution Place 1 drop Into the left eye At Bedtime       LORazepam (ATIVAN) 0.5 MG tablet Take 1 tablet (0.5 mg) by mouth 2 times daily as needed for anxiety Do not take hydroxyzine within 6 hours of last lorazepam dose. 30 tablet 1     losartan (COZAAR) 100 MG tablet Take 1 tablet (100 mg) by mouth daily 90 tablet 1     MELATONIN PO Take by mouth At Bedtime       VITAMIN E 400 UNIT OR CAPS ONE CAPSULE DAILY               Review of Systems   Constitutional, HEENT, cardiovascular, pulmonary, gi and gu systems are negative, except as otherwise noted.       Objective          Vitals:  No vitals were obtained today due to virtual visit.    healthy, alert and no distress  PSYCH: Alert and oriented times 3; coherent speech, normal   rate and volume, able to articulate logical thoughts, able   to abstract reason, no tangential thoughts, no hallucinations   or delusions  Her affect is anxious  RESP: No cough, no audible wheezing, able to talk in full sentences  Remainder of exam unable to be completed due to telephone visits            Assessment/Plan:    (F41.9) Anxiety  (primary encounter diagnosis)  Comment: significant.   Plan: LORazepam (ATIVAN) 0.5 MG tablet        Discussed need for good sleep.  Will be taking dog to vet to consider what can be done.  Discussed setting up a space that will contain the dog with puppy pads so she does not have to get up to care for dog during the night.  Stressed that without improved sleep, will have limited success at controlling mood and anxiety no matter how much counseling or medication use.   Ativan ordered, discussed that this is a temporary medication, Discussed potential side effects and actions to take if they occur.  Do not take hydroxyzine during the day if taking ativan.  Ok to use hydroxyzine at night if has been at least 6 hours since last ativan  dose.    Encouraged to keep intake appt.   Discussed not to assume daughter is angry, she too has lost someone she loves and is likely struggling to help her mom with her grief as well as dealing with her own all compounded by worsening pandemic.    Follow up as scheduled 11/30/2020 with me to reasses.    Patient Instructions     Each morning take citalopram 20 mg tablet.      During the day if you have moderate to severe anxiety you can take either Ativan (lorazepam 0.5 mg) up to twice a day OR hydroxyzine 10 mg every 6 hours, do not mix the medications.      Take hydroxyzine 25 mg at night for sleep, do not take within 6 hours of taking Ativan or the other hydroxyzine.            Phone call duration:  16 minutes

## 2020-11-09 NOTE — TELEPHONE ENCOUNTER
Prior Authorization Retail Medication Request    Medication/Dose: LORazepam (ATIVAN) 0.5 MG tablet  ICD code (if different than what is on RX):  Anxiety [F41.9]   Previously Tried and Failed:    Rationale:      Covermymeds Key:[id number]

## 2020-11-09 NOTE — PATIENT INSTRUCTIONS
Each morning take citalopram 20 mg tablet.      During the day if you have moderate to severe anxiety you can take either Ativan (lorazepam 0.5 mg) up to twice a day OR hydroxyzine 10 mg every 6 hours, do not mix the medications.      Take hydroxyzine 25 mg at night for sleep, do not take within 6 hours of taking Ativan or the other hydroxyzine.

## 2020-11-09 NOTE — TELEPHONE ENCOUNTER
Prior Authorization Approval    Authorization Effective Date: 10/10/2020  Authorization Expiration Date: 11/9/2021  Medication: LORazepam (ATIVAN) 0.5 MG tablet  Approved Dose/Quantity:    Reference #:     Insurance Company: [employer] - Phone [phone] Fax [phone]  Expected CoPay:       CoPay Card Available:      Foundation Assistance Needed:    Which Pharmacy is filling the prescription (Not needed for infusion/clinic administered): [facility][id number] - [facility] [address]  Pharmacy Notified: Yes  Patient Notified: Yes  **Instructed pharmacy to notify patient when script is ready to /ship.**

## 2020-11-09 NOTE — TELEPHONE ENCOUNTER
Central Prior Authorization Team   Phone: [phone]      PA Initiation    Medication: LORazepam (ATIVAN) 0.5 MG tablet  Insurance Company: [employer] - Phone [phone] Fax [phone]  Pharmacy Filling the Rx: [facility][id number] - [place] - [address] [facility]  Filling Pharmacy Phone: [phone]  Filling Pharmacy Fax:    Start Date: 11/9/2020

## 2020-11-17 ENCOUNTER — TELEPHONE (OUTPATIENT)
Dept: OBGYN | Facility: CLINIC | Age: 65
End: 2020-11-17

## 2020-11-17 NOTE — TELEPHONE ENCOUNTER
Reason for Call:  Other appointment    Detailed comments: Maral vicente is trying to schedule a visit for henri following her hospital stay. However she needs a biopsy. Please call to schedule.    Phone Number Patient can be reached at: Cell number on file:    Telephone Information:   Mobile [phone]       Best Time: any    Can we leave a detailed message on this number? Not Applicable    Call taken on 11/17/2020 at 10:17 AM by Sidra Wilcox

## 2020-11-19 NOTE — TELEPHONE ENCOUNTER
Patient is scheduled for 11/30 with Dr. Tapia.    Next 5 appointments (look out 90 days)    Nov 30, 2020  2:45 PM  Office Visit with Deny Tapia MD  [facility] ([facility] [address] [address] [phone]

## 2020-11-22 ENCOUNTER — TRANSFERRED RECORDS (OUTPATIENT)
Dept: HEALTH INFORMATION MANAGEMENT | Facility: CLINIC | Age: 65
End: 2020-11-22

## 2020-11-30 ENCOUNTER — TELEPHONE (OUTPATIENT)
Dept: FAMILY MEDICINE | Facility: CLINIC | Age: 65
End: 2020-11-30

## 2020-11-30 NOTE — TELEPHONE ENCOUNTER
Reason for call:  Other   Patient called regarding (reason for call): OMAR     Additional comments: Patient's brother calling to inform care team that patient is at [facility] ER on a mental hold due to depression. If any questions, call to advise.     Phone number to reach patient:  Other phone number:  [phone]    Best Time:  Any     Can we leave a detailed message on this number?  YES    Travel screening: Not Applicable

## 2020-11-30 NOTE — TELEPHONE ENCOUNTER
Spoke with Dr. López, pt will be admitted to [facility].  He will pass along the need for gyn consult to complete the needed endometrial biopsy that has been postponed several times due to her mental health.  Reviewed that she has had postmenopausal bleeding x several months, also had abnormal pelvic US.  Needs endometrial biopsy completed soon.

## 2020-11-30 NOTE — TELEPHONE ENCOUNTER
Pt's brother Graham ponce called back [phone]  [facility] psych nurses station [phone]

## 2020-12-01 ENCOUNTER — TELEPHONE (OUTPATIENT)
Dept: FAMILY MEDICINE | Facility: CLINIC | Age: 65
End: 2020-12-01

## 2020-12-01 NOTE — TELEPHONE ENCOUNTER
Graham states he would like you to know that patient has been transported from [facility] to [facility].    Thank you.

## 2020-12-01 NOTE — TELEPHONE ENCOUNTER
"No consent to communicate.  Graham, brother reports, he knows Dr Colleen Marroquin had spoken to the provider at the hospital.  Patient was transferred to [facility] for Mental Health.  Aware concerns for other medical issues in addition to Mental Health.  He is aware the second Endo bx was canceled for the second time.  \"her in a hospital setting not getting the procedure done\".  \"wonders if Lala, is afraid of the outcome and not wanting to procedure\".  Discussed HIPPA, need for PHI to be shared.    Wonders if you can influence someone at [place] to have this procedure done.  Graham and Lala's daughter the suicide attempt had some relation to the potential cancer diagnosis.  Created a great deal of anxiety.  One of the triggers, that \"set her off with her suicide attempt\".     [facility], her floor, phone number is [phone].      Grhaam reports, he is on PHI at the hospital.  Discussed need PHI here.   Verbalized good understanding.     Wanted to you to help where you can.      Denise Norris RN     "

## 2020-12-02 NOTE — TELEPHONE ENCOUNTER
Spoke with charge nurse at [facility] inpt. Also placed staff alert in [employer] in pt's chart to encourage them to consult gyn to complete the biopsy while inpt

## 2020-12-16 NOTE — PROGRESS NOTES
Subjective     Lindsay Yuen is a 65 year old female who presents to clinic today for the following health issues:    HPI           Hospital Follow-up Visit:    Hospital/Nursing Home/IP Rehab Facility: [facility]  Date of Admission: 11/30/20  Date of Discharge: 12/20/20  Reason(s) for Admission: Anxiety, Current moderate episode of major depressive disorder without prior episode, complicated grief; adjustment insomnia, constipation      Was your hospitalization related to COVID-19? No   Problems taking medications regularly:  None  Medication changes since discharge: updated in epic   Problems adhering to non-medication therapy:  None.   Breathing exercises, staying busy    BP running lower - patient reported, does feel tired and dizzy at times.       Summary of hospitalization:  CareEverywhere information obtained and reviewed  Diagnostic Tests/Treatments reviewed.  Follow up needed: in partial hospitalization program since discharge  Other Healthcare Providers Involved in Patient s Care:         None  Update since discharge: improved. Tolerating medications well.  Needs to be scheduled asap for endometrial biopsy  Post Discharge Medication Reconciliation: discharge medications reconciled, continue medications without change.  Plan of care communicated with patient              Labs reviewed in EPIC  BP Readings from Last 3 Encounters:   12/21/20 100/62   10/20/20 116/68   09/16/20 135/76    Wt Readings from Last 3 Encounters:   12/21/20 88.7 kg (195 lb 9.6 oz)   10/20/20 90.7 kg (200 lb)   09/16/20 92.6 kg (204 lb 3.2 oz)                  Patient Active Problem List   Diagnosis     Hyperlipidemia with target LDL less than 130     Cervical polyp     Hypertension goal BP (blood pressure) < 140/90     Advanced directives, counseling/discussion     Morbid obesity due to excess calories (H)     Past Surgical History:   Procedure Laterality Date     CHOLECYSTECTOMY, LAPOROSCOPIC              Social History      Tobacco Use     Smoking status: Never Smoker     Smokeless tobacco: Never Used     Tobacco comment: no smokers in the [facility]   Substance Use Topics     Alcohol use: Yes     Comment: occasional     Family History   Problem Relation Age of Onset     Breast Cancer Mother      Hypertension Mother      Lipids Mother      Cancer Father         kidney cancer     Diabetes Paternal Grandfather         adult     Breast Cancer Sister      Cancer Sister      Heart Disease Maternal Aunt      Cancer Sister         pancreatic         Current Outpatient Medications   Medication Sig Dispense Refill     ARIPiprazole (ABILIFY) 5 MG tablet Take 5 mg by mouth every morning       aspirin (ASA) 325 MG EC tablet Take 325 mg by mouth daily With a meal       atorvastatin (LIPITOR) 10 MG tablet Take 1 tablet (10 mg) by mouth daily 90 tablet 1     CALCIUM 600 + D 600-200 MG-UNIT OR TABS 2 pill a day  3     CENTRUM SILVER OR TABS ONE DAILY 0 0     gabapentin (NEURONTIN) 300 MG capsule Take 300 mg by mouth 3 times daily       hydrochlorothiazide (HYDRODIURIL) 25 MG tablet Take 1 tablet (25 mg) by mouth daily 90 tablet 1     hydrOXYzine (VISTARIL) 25 MG capsule Take 25 mg by mouth every 6 hours as needed       latanoprost (XALATAN) 0.005 % ophthalmic solution Place 1 drop Into the left eye At Bedtime       losartan (COZAAR) 100 MG tablet Take 1 tablet (100 mg) by mouth daily 90 tablet 1     sertraline (ZOLOFT) 50 MG tablet Take 125 mg by mouth daily       traZODone (DESYREL) 50 MG tablet Take 50 mg by mouth At Bedtime       VITAMIN E 400 UNIT OR CAPS ONE CAPSULE DAILY          Review of Systems   Constitutional, HEENT, cardiovascular, pulmonary, gi and gu systems are negative, except as otherwise noted.      Objective    /62   Pulse 65   Temp 98  F (36.7  C) (Tympanic)   Resp 16   Wt 88.7 kg (195 lb 9.6 oz)   SpO2 96%   BMI 33.57 kg/m    Body mass index is 33.57 kg/m .  Physical Exam   GENERAL: healthy, alert and no  distress  EYES: Eyes grossly normal to inspection, PERRL and conjunctivae and sclerae normal  MS: no gross musculoskeletal defects noted, no edema  SKIN: no suspicious lesions or rashes  PSYCH: mentation appears normal and affect flat            Assessment & Plan     (Z09) Hospital discharge follow-up  (primary encounter diagnosis)  (F32.3) Major depressive disorder, single episode, severe with psychotic features (H)  Comment: improving on meds and participating in PHP   Plan: continue PHP, close follow-up     (N95.0) Post-menopausal bleeding  (R93.89) Abnormal ultrasound of pelvis  Comment: no further bleeding since hospitalization  Plan: needs biopsy completed. Will help to arrange with gyn            Patient Instructions       Return to baby aspirin daily instead of full dose.      Cut losartan tablet in half and take 1/2 tablet daily for blood pressure.      After on lower dose of losartan for 2 weeks return to [facility] pharmacy for a blood pressure check and bring your home cuff with you.    Continue day treatment classes.    Expect a phone call to schedule the biopsy with Dr. Tapia.      [employer] username is [email or web address].      Return in about 2 weeks (around 1/4/2021).    Colleen Marroquin MD  [facility]

## 2020-12-21 ENCOUNTER — PATIENT OUTREACH (OUTPATIENT)
Dept: CARE COORDINATION | Facility: CLINIC | Age: 65
End: 2020-12-21

## 2020-12-21 ENCOUNTER — TELEPHONE (OUTPATIENT)
Dept: FAMILY MEDICINE | Facility: CLINIC | Age: 65
End: 2020-12-21

## 2020-12-21 ENCOUNTER — OFFICE VISIT (OUTPATIENT)
Dept: FAMILY MEDICINE | Facility: CLINIC | Age: 65
End: 2020-12-21
Payer: COMMERCIAL

## 2020-12-21 VITALS
SYSTOLIC BLOOD PRESSURE: 100 MMHG | WEIGHT: 195.6 LBS | DIASTOLIC BLOOD PRESSURE: 62 MMHG | HEART RATE: 65 BPM | BODY MASS INDEX: 33.57 KG/M2 | OXYGEN SATURATION: 96 % | RESPIRATION RATE: 16 BRPM | TEMPERATURE: 98 F

## 2020-12-21 DIAGNOSIS — Z09 HOSPITAL DISCHARGE FOLLOW-UP: Primary | ICD-10-CM

## 2020-12-21 DIAGNOSIS — F32.9 MAJOR DEPRESSION: Primary | ICD-10-CM

## 2020-12-21 DIAGNOSIS — F32.3 MAJOR DEPRESSIVE DISORDER, SINGLE EPISODE, SEVERE WITH PSYCHOTIC FEATURES (H): ICD-10-CM

## 2020-12-21 DIAGNOSIS — R93.89 ABNORMAL ULTRASOUND OF PELVIS: ICD-10-CM

## 2020-12-21 DIAGNOSIS — N95.0 POST-MENOPAUSAL BLEEDING: ICD-10-CM

## 2020-12-21 PROCEDURE — 99495 TRANSJ CARE MGMT MOD F2F 14D: CPT | Performed by: FAMILY MEDICINE

## 2020-12-21 RX ORDER — TRAZODONE HYDROCHLORIDE 50 MG/1
50 TABLET, FILM COATED ORAL AT BEDTIME
COMMUNITY
Start: 2020-11-18 | End: 2021-01-25

## 2020-12-21 RX ORDER — ARIPIPRAZOLE 5 MG/1
5 TABLET ORAL EVERY MORNING
COMMUNITY
Start: 2020-12-18 | End: 2021-02-19

## 2020-12-21 RX ORDER — HYDROXYZINE PAMOATE 25 MG/1
25 CAPSULE ORAL EVERY 6 HOURS PRN
COMMUNITY
Start: 2020-12-17 | End: 2021-07-12

## 2020-12-21 RX ORDER — GABAPENTIN 300 MG/1
300 CAPSULE ORAL 3 TIMES DAILY
COMMUNITY
Start: 2020-11-18 | End: 2021-04-06

## 2020-12-21 RX ORDER — ASPIRIN 81 MG/1
81 TABLET ORAL DAILY
COMMUNITY

## 2020-12-21 NOTE — PATIENT INSTRUCTIONS
Return to baby aspirin daily instead of full dose.      Cut losartan tablet in half and take 1/2 tablet daily for blood pressure.      After on lower dose of losartan for 2 weeks return to [facility] pharmacy for a blood pressure check and bring your home cuff with you.    Continue day treatment classes.    Expect a phone call to schedule the biopsy with Dr. Tapia.      [employer] username is [email or web address].

## 2020-12-21 NOTE — TELEPHONE ENCOUNTER
Reason for Call:  NEEDS HOME CARE ORDERS    Detailed comments: [facility] NEEDS HOME CARE ORDERS FROM THE DOCTOR, FOR MEDICATION, PHYSICAL THERAPY AND CARES.    Phone Number Patient can be reached at: Other phone number:  [phone]    Best Time: ANYTIME 7AM-4    Can we leave a detailed message on this number? YES    Call taken on 12/21/2020 at 3:06 PM by Maeve Bloom

## 2020-12-22 ENCOUNTER — PATIENT OUTREACH (OUTPATIENT)
Dept: NURSING | Facility: CLINIC | Age: 65
End: 2020-12-22
Payer: COMMERCIAL

## 2020-12-22 ENCOUNTER — NURSE TRIAGE (OUTPATIENT)
Dept: FAMILY MEDICINE | Facility: CLINIC | Age: 65
End: 2020-12-22

## 2020-12-22 NOTE — TELEPHONE ENCOUNTER
[facility] calling and state looking for a signed order to start home care.  Discussed verbal order given yesterday.  Discussed we do not have form to fax.  Discussed we give verbal and home care faxing their form for MD signature.  They will fax form for MD signature.  Sayra Henry RN

## 2020-12-22 NOTE — PROGRESS NOTES
"Clinic Care Coordination Contact    Clinic Care Coordination Contact  OUTREACH    Referral Information:       Primary Diagnosis: Psychosocial    Chief Complaint   Patient presents with     Clinic Care Coordination - Initial     Jordan FoxDoris FRANCIS        [employer] Utilization: [facility]; [employer]     Utilization    Last refreshed: 12/22/2020 12:42 PM: Hospital Admissions 0           Last refreshed: 12/22/2020 12:42 PM: ED Visits 0           Last refreshed: 12/22/2020 12:42 PM: No Show Count (past year) 1              Current as of: 12/22/2020 12:42 PM              Clinical Concerns:  Current Medical Concerns:    Patient Active Problem List   Diagnosis     Hyperlipidemia with target LDL less than 130     Cervical polyp     Hypertension goal BP (blood pressure) < 140/90     Advanced directives, counseling/discussion     Morbid obesity due to excess calories (H)       Current Behavioral Concerns: Patient recently discharged from inpatient psych. 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 During her initial visit to the ED on 11/28, prior to her transfer to inpatient psych, patient reported having bad thoughts. Documentation indicates that patient was \"experiencing an increase in depression as well as delusional and visual hallucinations\"  Education Provided to patient: Introduced self and role to patient and brother Graham, who was on the phone call as well      Health Maintenance Reviewed: Due/Overdue   Health Maintenance Due   Topic Date Due     DEPRESSION ACTION PLAN  1955     COLORECTAL CANCER SCREENING  08/04/2020     Clinical Pathway:  Clinic Care Coordinator - Depression Lifestyle  Patient identified their support system as: Graham Sawant.    Importance of identifying support system and reaching out to supports when needed was discussed.   Importance of having ROIs for support system was not discussed.  SERJIO was not sent with Pt to complete and return to " clinic.    Medication Management:  Patient reports taking medications on time and as prescribed     Functional Status:       Living Situation:       Lifestyle & Psychosocial Needs: [facility] contacted patient for attempt two. Was able to speak with patient. Her brother, Graham, was on the phone as well. Patient stated that her brother has been staying with her since discharge. She feels that she has a large support system. She has been having some issues logging on to her zoom meetings for her mental health appointments. She is working on this with her brother. Patient's brother stated that she will have an RN visit tomorrow from [facility]. Patient also had a follow up call with a [employer] Care Coordinator. Patient did not feel that shew ould need any further follow ups from [facility]. Brother did ask for a transportation resource just in case family can't take patient to appointments. They do have them covered for now, but wants a back up option just in case. [facility] provided the number for [employer].                       Socioeconomic History     Marital status:      Spouse name: Agusto     Number of children: 2     Years of education: Not on file     Highest education level: Not on file   Occupational History     Occupation:  provider     Employer: [employer] [address]     Tobacco Use     Smoking status: Never Smoker     Smokeless tobacco: Never Used     Tobacco comment: no smokers in the [facility]   Substance and Sexual Activity     Alcohol use: Yes     Comment: occasional     Drug use: No     Sexual activity: Yes     Partners: Male     Comment: post menopausal        Resources and Interventions:  Current Resources:         Goals: No goals were created d/t patient declining Care Coordination services.      Patient/Caregiver understanding: Yes    Outreach Frequency: monthly  Future Appointments              In 2 months Colleen Marroquin MD [facility]  [facility]          Plan: No further follow ups will take place d/t patient declining Care Coordination services. Patient was notified how to get Care Coordination services in the future, if needed.    Jordan Fox MIO  Primary Care Clinic- Social Work Care Coordinator  [facility] [place]  Ph: [phone]  12/22/2020 3:33 PM

## 2020-12-23 ENCOUNTER — TELEPHONE (OUTPATIENT)
Dept: FAMILY MEDICINE | Facility: CLINIC | Age: 65
End: 2020-12-23

## 2020-12-24 NOTE — TELEPHONE ENCOUNTER
Reason for Call:  Home Health Care    Vantita with [facility] called regarding (reason for call):  Home Care Nurse orders    Orders are needed for this patient. Skilled Nursing      PT:     OT:     Skilled Nursin time a week for 3wks for depression and anxiety    Pt Provider:     Phone Number Homecare Nurse can be reached at: [phone]    Can we leave a detailed message on this number? YES      Best Time: anytime    Call taken on 2020 at 6:08 PM by Sara White

## 2020-12-24 NOTE — TELEPHONE ENCOUNTER
Left message on answering machine for home care nurse to call back.    Verbal orders ok for home care for skilled nursing as below    Radha AYALAN, RN, CPN

## 2020-12-28 ENCOUNTER — TELEPHONE (OUTPATIENT)
Dept: FAMILY MEDICINE | Facility: CLINIC | Age: 65
End: 2020-12-28

## 2020-12-28 NOTE — TELEPHONE ENCOUNTER
Dr Colleen Marroquin is requesting for someone from OB/Gyn  to call patient to schedule an Endometrial Biopsy with   Dr Tapia. Patient is resistant to call to schedule herself.  Thank you.  NSIH Sam

## 2020-12-28 NOTE — TELEPHONE ENCOUNTER
Left message for patient to call back. Please see message below and assist in scheduling.  Tiffany Lama, CMA

## 2020-12-29 ENCOUNTER — TELEPHONE (OUTPATIENT)
Dept: FAMILY MEDICINE | Facility: CLINIC | Age: 65
End: 2020-12-29

## 2020-12-29 NOTE — TELEPHONE ENCOUNTER
Reason for Call: Request for an order or referral:    Order or referral being requested: Skilled nursing orders for [facility] to see the patient 1 x a week for the next 3 weeks.    Date needed: as soon as possible    Has the patient been seen by the PCP for this problem? Not Applicable    Additional comments:     Phone number Patient can be reached at:  Other phone number:  [facility] # [phone]      Best Time:  Anytime    Caller informed that calls received after 3pm may not be returned same day.    Can we leave a detailed message on this number?  YES    Call taken on 12/29/2020 at 5:32 PM by Rae Hall

## 2020-12-30 ENCOUNTER — MEDICAL CORRESPONDENCE (OUTPATIENT)
Dept: HEALTH INFORMATION MANAGEMENT | Facility: CLINIC | Age: 65
End: 2020-12-30

## 2020-12-30 ENCOUNTER — TELEPHONE (OUTPATIENT)
Dept: FAMILY MEDICINE | Facility: CLINIC | Age: 65
End: 2020-12-30

## 2020-12-30 NOTE — TELEPHONE ENCOUNTER
Calling needing orders for skilled nursing 1x/week x 3 weeks  Given verbal orders to home care nurse for skilled nursing    Radha AYALAN, RN, CPN

## 2020-12-30 NOTE — TELEPHONE ENCOUNTER
Verbal orders given via VM for the requested orders as original orders for home care were given 12/21/2020. Thank you. Eneida Fuller R.N.

## 2021-01-11 ENCOUNTER — ALLIED HEALTH/NURSE VISIT (OUTPATIENT)
Dept: FAMILY MEDICINE | Facility: CLINIC | Age: 66
End: 2021-01-11

## 2021-01-11 VITALS — DIASTOLIC BLOOD PRESSURE: 70 MMHG | HEART RATE: 60 BPM | SYSTOLIC BLOOD PRESSURE: 118 MMHG

## 2021-01-11 DIAGNOSIS — Z01.30 BLOOD PRESSURE CHECK: Primary | ICD-10-CM

## 2021-01-11 PROCEDURE — 99207 PR NO CHARGE NURSE ONLY: CPT | Performed by: FAMILY MEDICINE

## 2021-01-11 NOTE — PROGRESS NOTES
Lindsay Yuen was evaluated at [facility] on January 11, 2021 at which time her blood pressure was:    BP Readings from Last 3 Encounters:   01/11/21 118/70   12/21/20 100/62   10/20/20 116/68     Pulse Readings from Last 3 Encounters:   01/11/21 60   12/21/20 65   10/20/20 68       Reviewed lifestyle modifications for blood pressure control and reduction: including making healthy food choices, managing weight, getting regular exercise, smoking cessation, reducing alcohol consumption, monitoring blood pressure regularly.     Symptoms: None    BP Goal:< 140/90 mmHg    BP Assessment:  BP at goal    Potential Reasons for BP too high: NA - Not applicable    BP Follow-Up Plan: Recheck BP in 6 months at pharmacy    Recommendation to Provider: None    Note completed by: Fredis Dixon RPh.  [facility]  [phone]

## 2021-01-21 ENCOUNTER — OFFICE VISIT (OUTPATIENT)
Dept: OBGYN | Facility: CLINIC | Age: 66
End: 2021-01-21
Payer: COMMERCIAL

## 2021-01-21 VITALS
DIASTOLIC BLOOD PRESSURE: 72 MMHG | BODY MASS INDEX: 33.81 KG/M2 | HEART RATE: 58 BPM | WEIGHT: 197 LBS | SYSTOLIC BLOOD PRESSURE: 114 MMHG

## 2021-01-21 DIAGNOSIS — N95.0 POST-MENOPAUSE BLEEDING: Primary | ICD-10-CM

## 2021-01-21 PROCEDURE — 58100 BIOPSY OF UTERUS LINING: CPT | Performed by: OBSTETRICS & GYNECOLOGY

## 2021-01-21 PROCEDURE — 99203 OFFICE O/P NEW LOW 30 MIN: CPT | Mod: 25 | Performed by: OBSTETRICS & GYNECOLOGY

## 2021-01-21 PROCEDURE — 88305 TISSUE EXAM BY PATHOLOGIST: CPT | Performed by: PATHOLOGY

## 2021-01-21 NOTE — PROGRESS NOTES
Lindsay is a 65 year old No obstetric history on file.  is here today in follow up for postmenopausal bleeding back in the Fall. .       ROS: Pertinent ROS as above.    Gyn Hx:      Past Medical History:   Diagnosis Date     Abnormal Pap smear 1985    cryo, nl paps since     Glaucoma 01/30/2020    left eye      HTN (hypertension)      Hyperlipidemia LDL goal < 130      Past Surgical History:   Procedure Laterality Date     CHOLECYSTECTOMY, LAPOROSCOPIC            Patient Active Problem List   Diagnosis     Hyperlipidemia with target LDL less than 130     Cervical polyp     Hypertension goal BP (blood pressure) < 140/90     Advanced directives, counseling/discussion     Morbid obesity due to excess calories (H)       ALL/Meds: Her medication and allergy histories were reviewed and are documented in their appropriate chart areas.    SH: Reviewed and documented in the appropriate area of the chart.  FH:  Her family history is reviewed and updated in the chart, today.  PMH: Her past medical, surgical, and obstetric histories were reviewed and updated today in the appropriate chart areas.    PE: /72   Pulse 58   Wt 89.4 kg (197 lb)   Breastfeeding No   BMI 33.81 kg/m    Body mass index is 33.81 kg/m .    Pertinent Physical exam findings:    General Appearance:  healthy, alert, active, no distress  Abdomen: Benign, Soft, flat, non-tender, No masses, organomegaly, No inguinal nodes and Bowel sounds normoactive.   Pelvic:       - Ext: Vulva and perineum are normal without lesion, mass or discharge          - Vagina:  atrophic and without discharge       - Cervix: normal       - Uterus:Normal shape, position and consistency           I discussed the concerns related to post menopausal bleeding, including the association with endometrial hyperplasia and endometrial carcinoma.  We discussed the other potential causes for bleeding.  She has had a pelvic ultrasound.  The endometrial stripe was 11 mm  I discussed the  concerns related to a thicker endometrial strip and the increased association of an endometrial abnormality with a thickness of greater than or equal to 5 mm.  She appears to understand.  The plan will be to proceed with ENDOMETRIAL BIOPSY .    Endometrial biopsy was discussed including the risks/benefits and complications.  She does consent.  The biopsy was performed without complication.  Cervix was prepped with betadine. It was grasped with single-toothed tenaculum.  Pipelle sounded to 6 cm.  A representative sample was aspirated from the cavity and sent to pathology. The cervix was hemostatic. The patient tolerated the procedure well. .  A/P:(N95.0) Post-menopause bleeding  (primary encounter diagnosis)  Comment:   Plan: Surgical pathology exam, ENDOMETRIAL BIOPSY W/O        CERVICAL DILATION                 -     - No orders of the defined types were placed in this encounter.

## 2021-01-22 ENCOUNTER — TELEPHONE (OUTPATIENT)
Dept: FAMILY MEDICINE | Facility: CLINIC | Age: 66
End: 2021-01-22

## 2021-01-22 NOTE — TELEPHONE ENCOUNTER
Patientis on trazodone, since she started taking it, she has had diarrhea, she said that is one of the side effects. Please advise.Ute Cartre MA/NISH

## 2021-01-22 NOTE — TELEPHONE ENCOUNTER
Provider:  Can the patient just stop the trazodone?  Thank you. Eneida Fuller R.N.    She got the trazodone when she was in [facility] (12/20/2020) for depression and anxiety. Given to help her sleep.  She did not have diarrhea at first, then did and then is subsided for a little bit. The diarrhea has returned over the last 2 weeks.  It is completley liquids within the last 2 days.  Patient is looking to see if she can just stop the trazadone completely stop it.  She does not have a fever, her mouth is dry, but this is usual for her.  She can urinate at least every 8 hours. She reports that she is taking trazodone 50 mg -  1 tablet at bedtime.

## 2021-01-25 LAB — COPATH REPORT: NORMAL

## 2021-01-25 NOTE — TELEPHONE ENCOUNTER
Pt notified of provider message as written.  Pt verbalized good understanding.  Kelsie Dunne BSN, RN

## 2021-02-03 ENCOUNTER — TELEPHONE (OUTPATIENT)
Dept: OBGYN | Facility: CLINIC | Age: 66
End: 2021-02-03

## 2021-02-03 NOTE — TELEPHONE ENCOUNTER
Called patient and informed her of Dr. Tapia notes on her biopsy.  Patient is scheduled for a telephone visit tomorrow 2/4/2021 at 8am to discuss results and options.    Mya Jimenez, ERIKA  February 3, 2021

## 2021-02-04 ENCOUNTER — VIRTUAL VISIT (OUTPATIENT)
Dept: OBGYN | Facility: CLINIC | Age: 66
End: 2021-02-04
Payer: COMMERCIAL

## 2021-02-04 DIAGNOSIS — N95.0 POST-MENOPAUSE BLEEDING: ICD-10-CM

## 2021-02-04 PROCEDURE — 99213 OFFICE O/P EST LOW 20 MIN: CPT | Mod: TEL | Performed by: OBSTETRICS & GYNECOLOGY

## 2021-02-04 NOTE — PROGRESS NOTES
Lindsay is a 65 year old who is being evaluated via a billable telephone visit.      What phone number would you like to be contacted at? [phone]  How would you like to obtain your AVS? Mail a copy     Discussed her biopsy results and compared to her U/S results.    Explained why we need to get more tissue for a confirmatory result.  Discussed office and Outpatient surgery hysteroscopic biopsy.   Reviewed  risks, benefits, and alternatives of Hysteroscopy including but not limited to bleeding, infection, uterine perforation with damage to other organs, and possible need to for Laparoscopy or Laparotomy.  Reviewed pre and post operative course. Patient to see her primary care provider for pre-op evaluation.  All questions answered.  She appears to understand and does agree to proceed and wants to do it in outpatient surgery.    Assessment & Plan     Post-menopause bleeding  20 minutes spent on the date of the encounter doing chart review, patient visit and documentation   - Case Request: Diagnostic Hysteroscopy with biopsy                   No follow-ups on file.    Deny Tapia MD  [facility]

## 2021-02-17 ENCOUNTER — TELEPHONE (OUTPATIENT)
Dept: OBGYN | Facility: CLINIC | Age: 66
End: 2021-02-17

## 2021-02-17 NOTE — TELEPHONE ENCOUNTER
Associated Diagnoses    Post-menopause bleeding [N95.0]       Source Order Set    Order Set Name Order ID    [id number]   Case Request: Case Info    Panel 1 (Provider: Deny Tapia MD)    Procedures Laterality Anesthesia Region   Diagnostic Hysteroscopy with biopsy N/A Combined MAC with Local       Requested date:    Location:  OR   Patient class:       Pre-op diagnoses:  Post-menopause bleeding   Scheduling Instructions    Additional Instructions for the Case   Procedure notes:     Procedure length:  30 min   Diagnosis:  Post-menopausal bleeding   Request additional equipment:  Myosure   Location:  [facility]   Sterilization consent signed:  NA   OR staff assist:  no   H&P to be completed by PCP   Post op appointment needed:  no   Scheduling instructions:     Surgery Scheduled    Date of Surgery 3/23 Time of Surgery 9:30 a.m.  Type of Anesthesia Anticipated: Local with MAC  Pre-Op: On 3/15 with Colleen Marroquin at 7:00 a.m.  Post-Op:  Not needed  Pre-certification routed to Financial Counselors:  Yes  Auto routes    Surgery packet mailed to patient's home address: Yes  Patient instructed NPO 12 hours prior to surgery, arrive 1 hr 15 min prior to surgery, must have a .  Patient understood and agrees to the plan.      COVID testing:  3/19 10:30 [place]  Surgery Pre-Certification    Medical Record Number: [id number]  Lindsay Yuen  YOB: 1955   Phone: [phone] (home)   Primary Provider: Colleen Marroquin    Reason for Admit:  Post-menopause bleeding    Surgeon: JOSIAH Tapia MD  Surgical Procedure: Diagnostic Hysteroscopy with biopsy  ICD-9 Coded: Post-menopause bleeding [N95.0]   Date of Surgery: 3/23  Consent signed? N/A      Hospital: [facility]  Outpatient    Requestor:  Debby Epperson/Rupal Bernal     Location:  [place]

## 2021-02-17 NOTE — TELEPHONE ENCOUNTER
LVM for patient to call to get the information regarding surgery date and time, preop and Covid testing appointments.     Deysi Bernal

## 2021-03-01 NOTE — PROGRESS NOTES
"SUBJECTIVE:   Lindsay Yuen is a 65 year old female who presents for Preventive Visit.      Patient has been advised of split billing requirements and indicates understanding: Yes   Are you in the first 12 months of your Medicare coverage?  No    Healthy Habits:     In general, how would you rate your overall health?  Good    Frequency of exercise:  2-3 days/week    Duration of exercise:  15-30 minutes    Do you usually eat at least 4 servings of fruit and vegetables a day, include whole grains    & fiber and avoid regularly eating high fat or \"junk\" foods?  Yes    Taking medications regularly:  Yes    Medication side effects:  None    Ability to successfully perform activities of daily living:  No assistance needed    Home Safety:  Throw rugs in the hallway    Hearing Impairment:  No hearing concerns    In the past 6 months, have you been bothered by leaking of urine?  No    In general, how would you rate your overall mental or emotional health?  Good      PHQ-2 Total Score: 0    Additional concerns today:  No    Do you feel safe in your environment? Yes    Have you ever done Advance Care Planning? (For example, a Health Directive, POLST, or a discussion with a medical provider or your loved ones about your wishes): Has started to complete one.         Fall risk  Fallen 2 or more times in the past year?: No  Any fall with injury in the past year?: No    Cognitive Screening   1) Repeat 3 items (Leader, Season, Table)    2) Clock draw: NORMAL  3) 3 item recall: Recalls 3 objects  Results: NORMAL clock, 1-2 items recalled: COGNITIVE IMPAIRMENT LESS LIKELY    Mini-CogTM Copyright PETRONA Burton. Licensed by the author for use in [facility]; reprinted with permission (malcolm@.[facility]). All rights reserved.      Do you have sleep apnea, excessive snoring or daytime drowsiness?: no    Reviewed and updated as needed this visit by clinical staff  Tobacco  Allergies  Meds  Problems  Med Hx  Surg Hx  Fam Hx  Soc " Hx          Reviewed and updated as needed this visit by Provider  Tobacco  Allergies  Meds  Problems  Med Hx  Surg Hx  Fam Hx         Social History     Tobacco Use     Smoking status: Never Smoker     Smokeless tobacco: Never Used     Tobacco comment: no smokers in the [facility]   Substance Use Topics     Alcohol use: Yes     Comment: occasional         Alcohol Use 3/15/2021   Prescreen: >3 drinks/day or >7 drinks/week? Not Applicable   Prescreen: >3 drinks/day or >7 drinks/week? -               Current providers sharing in care for this patient include:   Patient Care Team:  Colleen Marroquin MD as PCP - General (Family Practice)  Colleen Marroquin MD as Assigned PCP  Deny Tapia MD as Assigned OBGYN Provider    The following health maintenance items are reviewed in Epic and correct as of today:  Health Maintenance   Topic Date Due     DEPRESSION ACTION PLAN  Never done     COVID-19 Vaccine (1 of 2) Never done     DEXA  06/16/2014     COLORECTAL CANCER SCREENING  08/04/2020     BMP  03/09/2021     LIPID  03/10/2021     FALL RISK ASSESSMENT  03/10/2021     PHQ-9  04/30/2021     MEDICARE ANNUAL WELLNESS VISIT  03/15/2022     MAMMO SCREENING  10/02/2022     ADVANCE CARE PLANNING  03/10/2025     DTAP/TDAP/TD IMMUNIZATION (4 - Td) 01/30/2029     HEPATITIS C SCREENING  Completed     INFLUENZA VACCINE  Completed     Pneumococcal Vaccine: 65+ Years  Completed     ZOSTER IMMUNIZATION  Completed     Pneumococcal Vaccine: Pediatrics (0 to 5 Years) and At-Risk Patients (6 to 64 Years)  Aged Out     IPV IMMUNIZATION  Aged Out     MENINGITIS IMMUNIZATION  Aged Out     HEPATITIS B IMMUNIZATION  Aged Out       G 2 P 2   No LMP recorded. Patient is postmenopausal.     Fasting: Yes    Td: tdap 1/19       Flu: 9/2020      Covid: recommended      Shingrix: completed      PPV: completed      NO - age 65 - see link Cervical Cytology Screening Guidelines  Last 3 Pap and HPV Results:   PAP / HPV 12/17/2014  9/20/2011   PAP NIL NIL                  Cholesterol:   Lab Results   Component Value Date    CHOL 153 03/10/2020     Lab Results   Component Value Date    HDL 60 03/10/2020     Lab Results   Component Value Date    LDL 67 03/10/2020     Lab Results   Component Value Date    TRIG 131 03/10/2020     Lab Results   Component Value Date    CHOLHDLRATIO 2.5 09/16/2015         MMG: 10/2020  Dexa:  6/09 due     Flex/colo:  8/2010 due      Seat Belt: Yes    Sunscreen use: Yes   Calcium Intake: adeq  Health Care Directive: No  Sexually Active: No    Current contraception: none  History of abnormal Pap smear: Yes: see [facility]  Family history of colon/breast/ovarian cancer: Yes: see [facility]  Regular self breast exam: Yes  History of abnormal mammogram: No      Labs reviewed in EPIC  BP Readings from Last 3 Encounters:   03/15/21 138/83   01/21/21 114/72   01/11/21 118/70    Wt Readings from Last 3 Encounters:   03/15/21 94.8 kg (209 lb)   01/21/21 89.4 kg (197 lb)   12/21/20 88.7 kg (195 lb 9.6 oz)                  Patient Active Problem List   Diagnosis     Hyperlipidemia with target LDL less than 130     Cervical polyp     Hypertension goal BP (blood pressure) < 140/90     Advanced directives, counseling/discussion     Morbid obesity due to excess calories (H)     Post-menopause bleeding     Major depressive disorder, single episode, severe with psychotic features (H)     Past Surgical History:   Procedure Laterality Date     CHOLECYSTECTOMY, LAPOROSCOPIC              Social History     Tobacco Use     Smoking status: Never Smoker     Smokeless tobacco: Never Used     Tobacco comment: no smokers in the [facility]   Substance Use Topics     Alcohol use: Yes     Comment: occasional     Family History   Problem Relation Age of Onset     Breast Cancer Mother      Hypertension Mother      Lipids Mother      Cancer Father         kidney cancer     Diabetes Paternal Grandfather         adult     Breast Cancer Sister      Cancer Sister       Heart Disease Maternal Aunt      Cancer Sister         pancreatic         Current Outpatient Medications   Medication Sig Dispense Refill     ARIPiprazole (ABILIFY) 5 MG tablet Take 1 tablet (5 mg) by mouth every morning 90 tablet 0     aspirin (ASA) 325 MG EC tablet Take 325 mg by mouth daily With a meal       atorvastatin (LIPITOR) 10 MG tablet Take 1 tablet (10 mg) by mouth daily 90 tablet 3     CALCIUM 600 + D 600-200 MG-UNIT OR TABS 2 pill a day  3     CENTRUM SILVER OR TABS ONE DAILY 0 0     gabapentin (NEURONTIN) 300 MG capsule Take 300 mg by mouth 3 times daily       hydrochlorothiazide (HYDRODIURIL) 25 MG tablet Take 1 tablet (25 mg) by mouth daily 90 tablet 1     hydrOXYzine (VISTARIL) 25 MG capsule Take 25 mg by mouth every 6 hours as needed       latanoprost (XALATAN) 0.005 % ophthalmic solution Place 1 drop Into the left eye At Bedtime       losartan (COZAAR) 100 MG tablet Take 1 tablet (100 mg) by mouth daily 90 tablet 1     sertraline (ZOLOFT) 50 MG tablet Take 2.5 tablets (125 mg) by mouth daily 225 tablet 1     VITAMIN E 400 UNIT OR CAPS ONE CAPSULE DAILY       Mammogram Screening: Recommended mammography every 1-2 years with patient discussion and risk factor consideration    Review of Systems   Constitutional: Negative for chills and fever.   HENT: Negative for congestion, ear pain, hearing loss and sore throat.    Eyes: Negative for pain and visual disturbance.   Respiratory: Negative for cough and shortness of breath.    Cardiovascular: Negative for chest pain, palpitations and peripheral edema.   Gastrointestinal: Negative for abdominal pain, constipation, diarrhea, heartburn, hematochezia and nausea.   Breasts:  Negative for tenderness, breast mass and discharge.   Genitourinary: Negative for dysuria, frequency, genital sores, hematuria, pelvic pain, urgency, vaginal bleeding and vaginal discharge.   Musculoskeletal: Positive for arthralgias. Negative for joint swelling and myalgias.   Skin:  "Negative for rash.   Neurological: Negative for dizziness, weakness, headaches and paresthesias.   Psychiatric/Behavioral: Negative for mood changes. The patient is not nervous/anxious.      Constitutional, HEENT, cardiovascular, pulmonary, gi and gu systems are negative, except as otherwise noted.    OBJECTIVE:   /83   Pulse 68   Temp 98  F (36.7  C) (Oral)   Resp 20   Ht 1.6 m (5' 3\")   Wt 94.8 kg (209 lb)   BMI 37.02 kg/m   Estimated body mass index is 37.02 kg/m  as calculated from the following:    Height as of this encounter: 1.6 m (5' 3\").    Weight as of this encounter: 94.8 kg (209 lb).  Physical Exam  GENERAL APPEARANCE: healthy, alert and no distress  EYES: Eyes grossly normal to inspection, PERRL and conjunctivae and sclerae normal  HENT: ear canals and TM's normal, nose and mouth without ulcers or lesions, oropharynx clear and oral mucous membranes moist  NECK: no adenopathy, no asymmetry, masses, or scars and thyroid normal to palpation  RESP: lungs clear to auscultation - no rales, rhonchi or wheezes  BREAST: normal without masses, tenderness or nipple discharge and no palpable axillary masses or adenopathy  CV: regular rate and rhythm, normal S1 S2, no S3 or S4, no murmur, click or rub, no peripheral edema and peripheral pulses strong  ABDOMEN: soft, nontender, no hepatosplenomegaly, no masses and bowel sounds normal  MS: no musculoskeletal defects are noted and gait is age appropriate without ataxia  SKIN: no suspicious lesions or rashes  NEURO: Normal strength and tone, sensory exam grossly normal, mentation intact and speech normal  PSYCH: mentation appears normal and affect normal/bright    Diagnostic Test Results:  Labs reviewed in Epic    ASSESSMENT / PLAN:   (Z00.00) Encounter for Medicare annual wellness exam  (primary encounter diagnosis)  Comment: preventive needs reviewed   Plan: see orders in Epic.     (F32.3) Major depressive disorder, single episode, severe with psychotic " "features (H)  (F41.9) Anxiety  Comment: improved  Plan: sertraline (ZOLOFT) 50 MG tablet, OFFICE/OUTPT         VISIT,EST,LEVL V         Refill x 6 months consider weaning over the summer    (E66.01) Morbid obesity due to excess calories (H)  Comment: trending up  Plan: OFFICE/OUTPT VISIT,EST,LEVL V        Encouraged activity with nicer weather    (Z01.818) Preoperative examination  (N95.0) Post-menopausal bleeding  Comment: thickened endometrium on US  Plan: OFFICE/OUTPT VISIT,EST,LEVL V        Cleared for procedure    (I10) Hypertension goal BP (blood pressure) < 140/90  Comment: to goal  Plan: BASIC METABOLIC PANEL, hydrochlorothiazide         (HYDRODIURIL) 25 MG tablet, losartan (COZAAR)         100 MG tablet, OFFICE/OUTPT VISIT,EST,LEVL V         Refill x 6 months     (E78.5) Hyperlipidemia with target LDL less than 130  Comment: stable  Plan: Lipid panel reflex to direct LDL Fasting,         atorvastatin (LIPITOR) 10 MG tablet,         OFFICE/OUTPT VISIT,EST,LEVL V        Refill x 1 yr       Patient has been advised of split billing requirements and indicates understanding: Yes  COUNSELING:  Reviewed preventive health counseling, as reflected in patient instructions  Special attention given to:       Regular exercise       Healthy diet/nutrition    Estimated body mass index is 37.02 kg/m  as calculated from the following:    Height as of this encounter: 1.6 m (5' 3\").    Weight as of this encounter: 94.8 kg (209 lb).    Weight management plan: Discussed healthy diet and exercise guidelines    She reports that she has never smoked. She has never used smokeless tobacco.      Appropriate preventive services were discussed with this patient, including applicable screening as appropriate for cardiovascular disease, diabetes, osteopenia/osteoporosis, and glaucoma.  As appropriate for age/gender, discussed screening for colorectal cancer, prostate cancer, breast cancer, and cervical cancer. Checklist reviewing " preventive services available has been given to the patient.    Reviewed patients plan of care and provided an AVS. The Intermediate Care Plan ( asthma action plan, low back pain action plan, and migraine action plan) for Lindsay meets the Care Plan requirement. This Care Plan has been established and reviewed with the Patient.    Counseling Resources:  ATP IV Guidelines  Pooled Cohorts Equation Calculator  Breast Cancer Risk Calculator  Breast Cancer: Medication to Reduce Risk  FRAX Risk Assessment  ICSI Preventive Guidelines  Dietary Guidelines for Americans, 2010  USDA's MyPlate  ASA Prophylaxis  Lung CA Screening    Colleen Marroquin MD  [facility]    Identified Health Risks:

## 2021-03-01 NOTE — PROGRESS NOTES
[facility]  [address] [address]  Phone: [phone]  Primary Provider: Colleen Vann  Pre-op Performing Provider: COLLEEN VANN      PREOPERATIVE EVALUATION:  Today's date: 3/15/2021    Lindsay Yuen is a 65 year old female who presents for a preoperative evaluation.    Surgical Information:  Surgery/Procedure: Diagnostic hysteroscopy with biopsy   Surgery Location: [place]   Surgeon: Dr. Tapia   Surgery Date: 3/23/21  Time of Surgery: 9:30  Where patient plans to recover: At home with family  Fax number for surgical facility: Note does not need to be faxed, will be available electronically in Epic.    Type of Anesthesia Anticipated: to be determined    Assessment & Plan     The proposed surgical procedure is considered LOW risk.    (Z01.818) Preoperative examination  (N95.0) Post-menopausal bleeding  Comment: thickened endometrial stripe on US  Plan: OFFICE/OUTPT VISIT,EST,LEVL V        Cleared for procedure    (I10) Hypertension goal BP (blood pressure) < 140/90  Comment: to goal  Plan: BASIC METABOLIC PANEL, hydrochlorothiazide         (HYDRODIURIL) 25 MG tablet, losartan (COZAAR)         100 MG tablet, OFFICE/OUTPT VISIT,EST,LEVL V          (E78.5) Hyperlipidemia with target LDL less than 130  Comment: stable  Plan: Lipid panel reflex to direct LDL Fasting,         atorvastatin (LIPITOR) 10 MG tablet,         OFFICE/OUTPT VISIT,EST,LEVL V            (F32.3) Major depressive disorder, single episode, severe with psychotic features (H)  (F41.9) Anxiety  Comment: improving with meds and therapy  Plan: sertraline (ZOLOFT) 50 MG tablet, OFFICE/OUTPT         VISIT,EST,LEVL V                   Risks and Recommendations:  The patient has the following additional risks and recommendations for perioperative complications:   - No identified additional risk factors other than previously addressed    Medication Instructions:  Patient is to take all scheduled  medications on the day of surgery EXCEPT for modifications listed below:   Stop Vit E one week prior to surgery    RECOMMENDATION:  APPROVAL GIVEN to proceed with proposed procedure, without further diagnostic evaluation.                      Subjective     HPI related to upcoming procedure: Pt with post menopausal bleeding and thickened endometrial stripe on pelvic ultrasound requiring diagnostic hysteroscopy.      Preop Questions 3/15/2021   1. Have you ever had a heart attack or stroke? No   2. Have you ever had surgery on your heart or blood vessels, such as a stent placement, a coronary artery bypass, or surgery on an artery in your head, neck, heart, or legs? No   3. Do you have chest pain with activity? No   4. Do you have a history of  heart failure? No   5. Do you currently have a cold, bronchitis or symptoms of other infection? No   6. Do you have a cough, shortness of breath, or wheezing? No   7. Do you or anyone in your family have previous history of blood clots? YES - mom/sister after surgery   8. Do you or does anyone in your family have a serious bleeding problem such as prolonged bleeding following surgeries or cuts? No   9. Have you ever had problems with anemia or been told to take iron pills? No   10. Have you had any abnormal blood loss such as black, tarry or bloody stools, or abnormal vaginal bleeding? YES - post menopausal bleeding   11. Have you ever had a blood transfusion? No   12. Are you willing to have a blood transfusion if it is medically needed before, during, or after your surgery? Yes   13. Have you or any of your relatives ever had problems with anesthesia? No   14. Do you have sleep apnea, excessive snoring or daytime drowsiness? UNKNOWN -    15. Do you have any artifical heart valves or other implanted medical devices like a pacemaker, defibrillator, or continuous glucose monitor? No   16. Do you have artificial joints? No   17. Are you allergic to latex? No   18. Is there any  chance that you may be pregnant? -     Health Care Directive:  Patient does not have a Health Care Directive or Living Will: Discussed advance care planning with patient; information given to patient to review.    Preoperative Review of :   reviewed - controlled substances prescribed by other outside provider(s).      Status of Chronic Conditions:  DEPRESSION - Patient has a long history of Depression of moderate severity requiring medication for control with recent symptoms being stable..Current symptoms of depression include none.     HYPERLIPIDEMIA - Patient has a long history of significant Hyperlipidemia requiring medication for treatment with recent good control. Patient reports no problems or side effects with the medication.     HYPERTENSION - Patient has longstanding history of HTN , currently denies any symptoms referable to elevated blood pressure. Specifically denies chest pain, palpitations, dyspnea, orthopnea, PND or peripheral edema. Blood pressure readings have been in normal range. Current medication regimen is as listed below. Patient denies any side effects of medication.       Review of Systems  CONSTITUTIONAL: NEGATIVE for fever, chills, change in weight  ENT/MOUTH: NEGATIVE for ear, mouth and throat problems  RESP: NEGATIVE for significant cough or SOB  CV: NEGATIVE for chest pain, palpitations or peripheral edema  GI: NEGATIVE for nausea, abdominal pain, heartburn, or change in bowel habits  : no dysuria, no vaginal bleeding  PSYCHIATRIC: NEGATIVE for changes in mood or affect    Patient Active Problem List    Diagnosis Date Noted     Post-menopause bleeding 02/04/2021     Priority: Medium     Advanced directives, counseling/discussion 01/24/2017     Priority: Medium     Discussed Advance Directive planning with patient; information given to patient to review.  Sharron Escobar CMA           Morbid obesity due to excess calories (H) 01/24/2017     Priority: Medium     Hypertension goal  BP (blood pressure) < 140/90 09/30/2015     Priority: Medium     Cervical polyp 09/20/2011     Priority: Medium     Hyperlipidemia with target LDL less than 130 08/17/2010     Priority: Medium     Diagnosis updated by automated process. Provider to review and confirm.        Past Medical History:   Diagnosis Date     Abnormal Pap smear 1985    cryo, nl paps since     Glaucoma 01/30/2020    left eye      HTN (hypertension)      Hyperlipidemia LDL goal < 130      Past Surgical History:   Procedure Laterality Date     CHOLECYSTECTOMY, LAPOROSCOPIC            Current Outpatient Medications   Medication Sig Dispense Refill     ARIPiprazole (ABILIFY) 5 MG tablet Take 1 tablet (5 mg) by mouth every morning 90 tablet 0     aspirin (ASA) 325 MG EC tablet Take 325 mg by mouth daily With a meal       atorvastatin (LIPITOR) 10 MG tablet Take 1 tablet (10 mg) by mouth daily 90 tablet 1     CALCIUM 600 + D 600-200 MG-UNIT OR TABS 2 pill a day  3     CENTRUM SILVER OR TABS ONE DAILY 0 0     gabapentin (NEURONTIN) 300 MG capsule Take 300 mg by mouth 3 times daily       hydrochlorothiazide (HYDRODIURIL) 25 MG tablet Take 1 tablet (25 mg) by mouth daily 90 tablet 1     hydrOXYzine (VISTARIL) 25 MG capsule Take 25 mg by mouth every 6 hours as needed       latanoprost (XALATAN) 0.005 % ophthalmic solution Place 1 drop Into the left eye At Bedtime       losartan (COZAAR) 100 MG tablet Take 1 tablet (100 mg) by mouth daily 90 tablet 1     sertraline (ZOLOFT) 50 MG tablet Take 125 mg by mouth daily       VITAMIN E 400 UNIT OR CAPS ONE CAPSULE DAILY         Allergies   Allergen Reactions     Triamterene Hives        Social History     Tobacco Use     Smoking status: Never Smoker     Smokeless tobacco: Never Used     Tobacco comment: no smokers in the [facility]   Substance Use Topics     Alcohol use: Yes     Comment: occasional       History   Drug Use No         Objective     There were no vitals taken for this visit.    Physical Exam     GENERAL APPEARANCE: healthy, alert and no distress     EYES: EOMI, PERRL     HENT: ear canals and TM's normal and nose and mouth without ulcers or lesions     NECK: no adenopathy, no asymmetry, masses, or scars and thyroid normal to palpation     RESP: lungs clear to auscultation - no rales, rhonchi or wheezes     CV: regular rates and rhythm, normal S1 S2, no S3 or S4 and no murmur, click or rub     ABDOMEN:  soft, nontender, no HSM or masses and bowel sounds normal     MS: extremities normal- no gross deformities noted, no evidence of inflammation in joints, FROM in all extremities.     SKIN: no suspicious lesions or rashes     NEURO: Normal strength and tone, sensory exam grossly normal, mentation intact, speech normal and cranial nerves 2-12 intact     PSYCH: mentation appears normal. and affect normal/bright    Recent Labs   Lab Test 20  1535 03/10/20  1032    139   POTASSIUM 3.8 3.5   CR 0.62 0.61        Diagnostics:  Labs pending at this time.  Results will be reviewed when available.   No EKG required, no history of coronary heart disease, significant arrhythmia, peripheral arterial disease or other structural heart disease.   EK2020 - Care Everywhere  Cardiac Echo 2020 - normal, see Care Everywhere    Revised Cardiac Risk Index (RCRI):  The patient has the following serious cardiovascular risks for perioperative complications:   - No serious cardiac risks = 0 points     RCRI Interpretation: 0 points: Class I (very low risk - 0.4% complication rate)             Signed Electronically by: Colleen Marroquin MD  Copy of this evaluation report is provided to requesting physician.    [facility] Guidelines    Revised Cardiac Risk Index

## 2021-03-01 NOTE — PATIENT INSTRUCTIONS
Patient Education   Personalized Prevention Plan  You are due for the preventive services outlined below.  Your care team is available to assist you in scheduling these services.  If you have already completed any of these items, please share that information with your care team to update in your medical record.  Health Maintenance Due   Topic Date Due     Depression Action Plan  Never done     COVID-19 Vaccine (1 of 2) Never done     Osteoporosis Screening  06/16/2014     Colorectal Cancer Screening  08/04/2020     Basic Metabolic Panel  03/09/2021     Cholesterol Lab  03/10/2021     FALL RISK ASSESSMENT  03/10/2021

## 2021-03-04 ENCOUNTER — TELEPHONE (OUTPATIENT)
Dept: FAMILY MEDICINE | Facility: CLINIC | Age: 66
End: 2021-03-04

## 2021-03-04 DIAGNOSIS — F41.9 ANXIETY: Primary | ICD-10-CM

## 2021-03-04 DIAGNOSIS — F32.3 MAJOR DEPRESSIVE DISORDER, SINGLE EPISODE, SEVERE WITH PSYCHOTIC FEATURES (H): ICD-10-CM

## 2021-03-04 NOTE — TELEPHONE ENCOUNTER
Routing refill request to provider for review/approval because:  Medication is reported/historical    Next 5 appointments (look out 90 days)    Mar 15, 2021  7:00 AM  PHYSICAL with Colleen Marroquin MD  [facility] [address] [facility] [address]  [phone]   Mar 19, 2021 10:30 AM  Pre-procedure Covid with AN COVID LAB  [facility] ([facility] ) [address] [facility] [address]  [phone]        Alexa Crawford BSN, RN

## 2021-03-06 DIAGNOSIS — Z11.59 ENCOUNTER FOR SCREENING FOR OTHER VIRAL DISEASES: ICD-10-CM

## 2021-03-08 ENCOUNTER — TRANSFERRED RECORDS (OUTPATIENT)
Dept: HEALTH INFORMATION MANAGEMENT | Facility: CLINIC | Age: 66
End: 2021-03-08

## 2021-03-14 PROBLEM — F32.3 MAJOR DEPRESSIVE DISORDER, SINGLE EPISODE, SEVERE WITH PSYCHOTIC FEATURES (H): Status: ACTIVE | Noted: 2021-03-14

## 2021-03-15 ENCOUNTER — OFFICE VISIT (OUTPATIENT)
Dept: FAMILY MEDICINE | Facility: CLINIC | Age: 66
End: 2021-03-15
Payer: COMMERCIAL

## 2021-03-15 VITALS
SYSTOLIC BLOOD PRESSURE: 138 MMHG | WEIGHT: 209 LBS | DIASTOLIC BLOOD PRESSURE: 83 MMHG | RESPIRATION RATE: 20 BRPM | TEMPERATURE: 98 F | BODY MASS INDEX: 37.03 KG/M2 | HEIGHT: 63 IN | HEART RATE: 68 BPM

## 2021-03-15 DIAGNOSIS — Z00.00 ENCOUNTER FOR MEDICARE ANNUAL WELLNESS EXAM: Primary | ICD-10-CM

## 2021-03-15 DIAGNOSIS — F32.3 MAJOR DEPRESSIVE DISORDER, SINGLE EPISODE, SEVERE WITH PSYCHOTIC FEATURES (H): ICD-10-CM

## 2021-03-15 DIAGNOSIS — E66.01 MORBID OBESITY DUE TO EXCESS CALORIES (H): ICD-10-CM

## 2021-03-15 DIAGNOSIS — E78.5 HYPERLIPIDEMIA WITH TARGET LDL LESS THAN 130: ICD-10-CM

## 2021-03-15 DIAGNOSIS — I10 HYPERTENSION GOAL BP (BLOOD PRESSURE) < 140/90: ICD-10-CM

## 2021-03-15 DIAGNOSIS — N95.0 POST-MENOPAUSAL BLEEDING: ICD-10-CM

## 2021-03-15 DIAGNOSIS — Z01.818 PREOPERATIVE EXAMINATION: ICD-10-CM

## 2021-03-15 DIAGNOSIS — F41.9 ANXIETY: ICD-10-CM

## 2021-03-15 LAB
ANION GAP SERPL CALCULATED.3IONS-SCNC: 5 MMOL/L (ref 3–14)
BUN SERPL-MCNC: 17 MG/DL (ref 7–30)
CALCIUM SERPL-MCNC: 9.2 MG/DL (ref 8.5–10.1)
CHLORIDE SERPL-SCNC: 104 MMOL/L (ref 94–109)
CHOLEST SERPL-MCNC: 185 MG/DL
CO2 SERPL-SCNC: 31 MMOL/L (ref 20–32)
CREAT SERPL-MCNC: 0.53 MG/DL (ref 0.52–1.04)
GFR SERPL CREATININE-BSD FRML MDRD: >90 ML/MIN/{1.73_M2}
GLUCOSE SERPL-MCNC: 95 MG/DL (ref 70–99)
HDLC SERPL-MCNC: 69 MG/DL
LDLC SERPL CALC-MCNC: 99 MG/DL
NONHDLC SERPL-MCNC: 116 MG/DL
POTASSIUM SERPL-SCNC: 3.2 MMOL/L (ref 3.4–5.3)
SODIUM SERPL-SCNC: 140 MMOL/L (ref 133–144)
TRIGL SERPL-MCNC: 87 MG/DL

## 2021-03-15 PROCEDURE — 99397 PER PM REEVAL EST PAT 65+ YR: CPT | Performed by: FAMILY MEDICINE

## 2021-03-15 PROCEDURE — 99214 OFFICE O/P EST MOD 30 MIN: CPT | Mod: 25 | Performed by: FAMILY MEDICINE

## 2021-03-15 PROCEDURE — 36415 COLL VENOUS BLD VENIPUNCTURE: CPT | Performed by: FAMILY MEDICINE

## 2021-03-15 PROCEDURE — 80048 BASIC METABOLIC PNL TOTAL CA: CPT | Performed by: FAMILY MEDICINE

## 2021-03-15 PROCEDURE — 80061 LIPID PANEL: CPT | Performed by: FAMILY MEDICINE

## 2021-03-15 RX ORDER — HYDROCHLOROTHIAZIDE 25 MG/1
25 TABLET ORAL DAILY
Qty: 90 TABLET | Refills: 1 | Status: SHIPPED | OUTPATIENT
Start: 2021-03-15 | End: 2021-04-06

## 2021-03-15 RX ORDER — ATORVASTATIN CALCIUM 10 MG/1
10 TABLET, FILM COATED ORAL DAILY
Qty: 90 TABLET | Refills: 3 | Status: SHIPPED | OUTPATIENT
Start: 2021-03-15 | End: 2022-03-01

## 2021-03-15 RX ORDER — LOSARTAN POTASSIUM 100 MG/1
100 TABLET ORAL DAILY
Qty: 90 TABLET | Refills: 1 | Status: SHIPPED | OUTPATIENT
Start: 2021-03-15 | End: 2022-02-25

## 2021-03-15 ASSESSMENT — ENCOUNTER SYMPTOMS
SHORTNESS OF BREATH: 0
ABDOMINAL PAIN: 0
WEAKNESS: 0
FREQUENCY: 0
DIZZINESS: 0
CONSTIPATION: 0
JOINT SWELLING: 0
MYALGIAS: 0
PALPITATIONS: 0
FEVER: 0
HEMATOCHEZIA: 0
ARTHRALGIAS: 1
HEARTBURN: 0
SORE THROAT: 0
EYE PAIN: 0
PARESTHESIAS: 0
DIARRHEA: 0
DYSURIA: 0
NERVOUS/ANXIOUS: 0
COUGH: 0
HEADACHES: 0
BREAST MASS: 0
NAUSEA: 0
HEMATURIA: 0
CHILLS: 0

## 2021-03-15 ASSESSMENT — ACTIVITIES OF DAILY LIVING (ADL): CURRENT_FUNCTION: NO ASSISTANCE NEEDED

## 2021-03-15 ASSESSMENT — MIFFLIN-ST. JEOR: SCORE: 1457.15

## 2021-03-15 NOTE — LETTER
My Depression Action Plan  Name: Lindsay Yuen   Date of Birth 1955  Date: 3/1/2021    My doctor: Colleen Marroquin   My clinic: [facility]  [address] [address] [phone]          GREEN    ZONE   Good Control    What it looks like:     Things are going generally well. You have normal ups and downs. You may even feel depressed from time to time, but bad moods usually last less than a day.   What you need to do:  1. Continue to care for yourself (see self care plan)  2. Check your depression survival kit and update it as needed  3. Follow your physician s recommendations including any medication.  4. Do not stop taking medication unless you consult with your physician first.           YELLOW         ZONE Getting Worse    What it looks like:     Depression is starting to interfere with your life.     It may be hard to get out of bed; you may be starting to isolate yourself from others.    Symptoms of depression are starting to last most all day and this has happened for several days.     You may have suicidal thoughts but they are not constant.   What you need to do:     1. Call your care team. Your response to treatment will improve if you keep your care team informed of your progress. Yellow periods are signs an adjustment may need to be made.     2. Continue your self-care.  Just get dressed and ready for the day.  Don't give yourself time to talk yourself out of it.    3. Talk to someone in your support network.    4. Open up your Depression Self-Care Plan/Wellness Kit.           RED    ZONE Medical Alert - Get Help    What it looks like:     Depression is seriously interfering with your life.     You may experience these or other symptoms: You can t get out of bed most days, can t work or engage in other necessary activities, you have trouble taking care of basic hygiene, or basic responsibilities, thoughts of suicide or death that will not go  away, self-injurious behavior.     What you need to do:  1. Call your care team and request a same-day appointment. If they are not available (weekends or after hours) call your local crisis line, emergency room or 911.          Depression Self-Care Plan / Wellness Kit    Many people find that medication and therapy are helpful treatments for managing depression. In addition, making small changes to your everyday life can help to boost your mood and improve your wellbeing. Below are some tips for you to consider. Be sure to talk with your medical provider and/or behavioral health consultant if your symptoms are worsening or not improving.     Sleep   Sleep hygiene  means all of the habits that support good, restful sleep. It includes maintaining a consistent bedtime and wake time, using your bedroom only for sleeping or sex, and keeping the bedroom dark and free of distractions like a computer, smartphone, or television.     Develop a Healthy Routine  Maintain good hygiene. Get out of bed in the morning, make your bed, brush your teeth, take a shower, and get dressed. Don t spend too much time viewing media that makes you feel stressed. Find time to relax each day.    Exercise  Get some form of exercise every day. This will help reduce pain and release endorphins, the  feel good  chemicals in your brain. It can be as simple as just going for a walk or doing some gardening, anything that will get you moving.      Diet  Strive to eat healthy foods, including fruits and vegetables. Drink plenty of water. Avoid excessive sugar, caffeine, alcohol, and other mood-altering substances.     Stay Connected with Others  Stay in touch with friends and family members.    Manage Your Mood  Try deep breathing, massage therapy, biofeedback, or meditation. Take part in fun activities when you can. Try to find something to smile about each day.     Psychotherapy  Be open to working with a therapist if your provider recommends it.      Medication  Be sure to take your medication as prescribed. Most anti-depressants need to be taken every day. It usually takes several weeks for medications to work. Not all medicines work for all people. It is important to follow-up with your provider to make sure you have a treatment plan that is working for you. Do not stop your medication abruptly without first discussing it with your provider.    Crisis Resources   These hotlines are for both adults and children. They and are open 24 hours a day, 7 days a week unless noted otherwise.      National Suicide Prevention Lifeline   3-794-942-TALK (2610)      Crisis Text Line    www.crisistextline.org  Text HOME to 374157 from anywhere in the [place], anytime, about any type of crisis. A live, trained crisis counselor will receive the text and respond quickly.      Jamie Lifeline for LGBTQ Youth  A national crisis intervention and suicide lifeline for LGBTQ youth under 25. Provides a safe place to talk without judgement. Call 1-331.493.5639; text START to 017898 or visit www.thetrevorproject.org to talk to a trained counselor.      For [facility] crisis numbers, visit the [facility] website at:  https://[place].gov/dhs/people-we-serve/adults/health-care/mental-health/resources/crisis-contacts.jsp

## 2021-03-15 NOTE — NURSING NOTE
"Chief Complaint   Patient presents with     Wellness Visit       Initial /83   Pulse 68   Temp 98  F (36.7  C) (Oral)   Resp 20   Ht 1.6 m (5' 3\")   Wt 94.8 kg (209 lb)   BMI 37.02 kg/m   Estimated body mass index is 37.02 kg/m  as calculated from the following:    Height as of this encounter: 1.6 m (5' 3\").    Weight as of this encounter: 94.8 kg (209 lb).  Medication Reconciliation: complete  Tariq Mcdermott, ERIKA    "

## 2021-03-15 NOTE — LETTER
March 16, 2021      Lindsay Yuen  [address]  [facility] [address]        Lindsay,   Your labs look good, though your potassium is slightly low.  I recommend eating a banana each day as your water pill can cause you to lose potassium.   Colleen Marroquin MD     Resulted Orders   BASIC METABOLIC PANEL   Result Value Ref Range    Sodium 140 133 - 144 mmol/L    Potassium 3.2 (L) 3.4 - 5.3 mmol/L    Chloride 104 94 - 109 mmol/L    Carbon Dioxide 31 20 - 32 mmol/L    Anion Gap 5 3 - 14 mmol/L    Glucose 95 70 - 99 mg/dL      Comment:      Fasting specimen    Urea Nitrogen 17 7 - 30 mg/dL    Creatinine 0.53 0.52 - 1.04 mg/dL    GFR Estimate >90 >60 mL/min/[1.73_m2]      Comment:      Non  GFR Calc  Starting 12/18/2018, serum creatinine based estimated GFR (eGFR) will be   calculated using the Chronic Kidney Disease Epidemiology Collaboration   (CKD-EPI) equation.      GFR Estimate If Black >90 >60 mL/min/[1.73_m2]      Comment:       GFR Calc  Starting 12/18/2018, serum creatinine based estimated GFR (eGFR) will be   calculated using the Chronic Kidney Disease Epidemiology Collaboration   (CKD-EPI) equation.      Calcium 9.2 8.5 - 10.1 mg/dL   Lipid panel reflex to direct LDL Fasting   Result Value Ref Range    Cholesterol 185 <200 mg/dL    Triglycerides 87 <150 mg/dL      Comment:      Fasting specimen    HDL Cholesterol 69 >49 mg/dL    LDL Cholesterol Calculated 99 <100 mg/dL      Comment:      Desirable:       <100 mg/dl    Non HDL Cholesterol 116 <130 mg/dL

## 2021-03-19 DIAGNOSIS — Z11.59 ENCOUNTER FOR SCREENING FOR OTHER VIRAL DISEASES: ICD-10-CM

## 2021-03-19 LAB
LABORATORY COMMENT REPORT: NORMAL
SARS-COV-2 RNA RESP QL NAA+PROBE: NEGATIVE
SARS-COV-2 RNA RESP QL NAA+PROBE: NORMAL
SPECIMEN SOURCE: NORMAL
SPECIMEN SOURCE: NORMAL

## 2021-03-19 PROCEDURE — 87635 SARS-COV-2 COVID-19 AMP PRB: CPT | Performed by: OBSTETRICS & GYNECOLOGY

## 2021-03-22 ENCOUNTER — ANESTHESIA EVENT (OUTPATIENT)
Dept: SURGERY | Facility: AMBULATORY SURGERY CENTER | Age: 66
End: 2021-03-22

## 2021-03-23 ENCOUNTER — HOSPITAL ENCOUNTER (OUTPATIENT)
Facility: AMBULATORY SURGERY CENTER | Age: 66
Discharge: HOME | End: 2021-03-23
Attending: OBSTETRICS & GYNECOLOGY | Admitting: OBSTETRICS & GYNECOLOGY
Payer: COMMERCIAL

## 2021-03-23 ENCOUNTER — ANESTHESIA (OUTPATIENT)
Dept: SURGERY | Facility: AMBULATORY SURGERY CENTER | Age: 66
End: 2021-03-23

## 2021-03-23 VITALS
DIASTOLIC BLOOD PRESSURE: 49 MMHG | OXYGEN SATURATION: 94 % | TEMPERATURE: 97 F | SYSTOLIC BLOOD PRESSURE: 122 MMHG | RESPIRATION RATE: 16 BRPM

## 2021-03-23 PROCEDURE — G8907 PT DOC NO EVENTS ON DISCHARG: HCPCS

## 2021-03-23 PROCEDURE — 58558 HYSTEROSCOPY BIOPSY: CPT | Performed by: OBSTETRICS & GYNECOLOGY

## 2021-03-23 PROCEDURE — 58558 HYSTEROSCOPY BIOPSY: CPT

## 2021-03-23 PROCEDURE — 88305 TISSUE EXAM BY PATHOLOGIST: CPT | Performed by: PATHOLOGY

## 2021-03-23 PROCEDURE — G8918 PT W/O PREOP ORDER IV AB PRO: HCPCS

## 2021-03-23 RX ORDER — ONDANSETRON 2 MG/ML
4 INJECTION INTRAMUSCULAR; INTRAVENOUS EVERY 30 MIN PRN
Status: DISCONTINUED | OUTPATIENT
Start: 2021-03-23 | End: 2021-03-24 | Stop reason: HOSPADM

## 2021-03-23 RX ORDER — SODIUM CHLORIDE, SODIUM LACTATE, POTASSIUM CHLORIDE, CALCIUM CHLORIDE 600; 310; 30; 20 MG/100ML; MG/100ML; MG/100ML; MG/100ML
INJECTION, SOLUTION INTRAVENOUS CONTINUOUS
Status: DISCONTINUED | OUTPATIENT
Start: 2021-03-23 | End: 2021-03-24 | Stop reason: HOSPADM

## 2021-03-23 RX ORDER — ONDANSETRON 2 MG/ML
INJECTION INTRAMUSCULAR; INTRAVENOUS PRN
Status: DISCONTINUED | OUTPATIENT
Start: 2021-03-23 | End: 2021-03-23

## 2021-03-23 RX ORDER — PHYSOSTIGMINE SALICYLATE 1 MG/ML
1.2 INJECTION INTRAVENOUS
Status: DISCONTINUED | OUTPATIENT
Start: 2021-03-23 | End: 2021-03-24 | Stop reason: HOSPADM

## 2021-03-23 RX ORDER — ALBUTEROL SULFATE 0.83 MG/ML
2.5 SOLUTION RESPIRATORY (INHALATION) EVERY 4 HOURS PRN
Status: DISCONTINUED | OUTPATIENT
Start: 2021-03-23 | End: 2021-03-24 | Stop reason: HOSPADM

## 2021-03-23 RX ORDER — KETOROLAC TROMETHAMINE 30 MG/ML
INJECTION, SOLUTION INTRAMUSCULAR; INTRAVENOUS PRN
Status: DISCONTINUED | OUTPATIENT
Start: 2021-03-23 | End: 2021-03-23

## 2021-03-23 RX ORDER — OXYCODONE HYDROCHLORIDE 5 MG/1
5 TABLET ORAL
Status: CANCELLED | OUTPATIENT
Start: 2021-03-23

## 2021-03-23 RX ORDER — HYDRALAZINE HYDROCHLORIDE 20 MG/ML
2.5-5 INJECTION INTRAMUSCULAR; INTRAVENOUS EVERY 10 MIN PRN
Status: DISCONTINUED | OUTPATIENT
Start: 2021-03-23 | End: 2021-03-24 | Stop reason: HOSPADM

## 2021-03-23 RX ORDER — ACETAMINOPHEN 325 MG/1
975 TABLET ORAL ONCE
Status: CANCELLED | OUTPATIENT
Start: 2021-03-23 | End: 2021-03-23

## 2021-03-23 RX ORDER — NALOXONE HYDROCHLORIDE 0.4 MG/ML
0.4 INJECTION, SOLUTION INTRAMUSCULAR; INTRAVENOUS; SUBCUTANEOUS
Status: DISCONTINUED | OUTPATIENT
Start: 2021-03-23 | End: 2021-03-24 | Stop reason: HOSPADM

## 2021-03-23 RX ORDER — OXYCODONE HYDROCHLORIDE 5 MG/1
5 TABLET ORAL EVERY 4 HOURS PRN
Status: DISCONTINUED | OUTPATIENT
Start: 2021-03-23 | End: 2021-03-24 | Stop reason: HOSPADM

## 2021-03-23 RX ORDER — FENTANYL CITRATE 50 UG/ML
25-50 INJECTION, SOLUTION INTRAMUSCULAR; INTRAVENOUS EVERY 5 MIN PRN
Status: DISCONTINUED | OUTPATIENT
Start: 2021-03-23 | End: 2021-03-24 | Stop reason: HOSPADM

## 2021-03-23 RX ORDER — LIDOCAINE 40 MG/G
CREAM TOPICAL
Status: DISCONTINUED | OUTPATIENT
Start: 2021-03-23 | End: 2021-03-24 | Stop reason: HOSPADM

## 2021-03-23 RX ORDER — FENTANYL CITRATE 50 UG/ML
25-50 INJECTION, SOLUTION INTRAMUSCULAR; INTRAVENOUS
Status: DISCONTINUED | OUTPATIENT
Start: 2021-03-23 | End: 2021-03-24 | Stop reason: HOSPADM

## 2021-03-23 RX ORDER — ACETAMINOPHEN 325 MG/1
975 TABLET ORAL ONCE
Status: COMPLETED | OUTPATIENT
Start: 2021-03-23 | End: 2021-03-23

## 2021-03-23 RX ORDER — ONDANSETRON 4 MG/1
4 TABLET, ORALLY DISINTEGRATING ORAL EVERY 30 MIN PRN
Status: DISCONTINUED | OUTPATIENT
Start: 2021-03-23 | End: 2021-03-24 | Stop reason: HOSPADM

## 2021-03-23 RX ORDER — BUPIVACAINE HYDROCHLORIDE AND EPINEPHRINE 5; 5 MG/ML; UG/ML
INJECTION, SOLUTION PERINEURAL PRN
Status: DISCONTINUED | OUTPATIENT
Start: 2021-03-23 | End: 2021-03-23 | Stop reason: HOSPADM

## 2021-03-23 RX ORDER — MEPERIDINE HYDROCHLORIDE 25 MG/ML
12.5 INJECTION INTRAMUSCULAR; INTRAVENOUS; SUBCUTANEOUS
Status: DISCONTINUED | OUTPATIENT
Start: 2021-03-23 | End: 2021-03-24 | Stop reason: HOSPADM

## 2021-03-23 RX ORDER — NALOXONE HYDROCHLORIDE 0.4 MG/ML
0.2 INJECTION, SOLUTION INTRAMUSCULAR; INTRAVENOUS; SUBCUTANEOUS
Status: DISCONTINUED | OUTPATIENT
Start: 2021-03-23 | End: 2021-03-24 | Stop reason: HOSPADM

## 2021-03-23 RX ORDER — FENTANYL CITRATE 50 UG/ML
INJECTION, SOLUTION INTRAMUSCULAR; INTRAVENOUS PRN
Status: DISCONTINUED | OUTPATIENT
Start: 2021-03-23 | End: 2021-03-23

## 2021-03-23 RX ORDER — ACETAMINOPHEN 325 MG/1
975 TABLET ORAL ONCE
Status: DISCONTINUED | OUTPATIENT
Start: 2021-03-23 | End: 2021-03-24 | Stop reason: HOSPADM

## 2021-03-23 RX ORDER — IBUPROFEN 600 MG/1
600 TABLET, FILM COATED ORAL ONCE
Status: CANCELLED | OUTPATIENT
Start: 2021-03-23 | End: 2021-03-23

## 2021-03-23 RX ORDER — PROPOFOL 10 MG/ML
INJECTION, EMULSION INTRAVENOUS CONTINUOUS PRN
Status: DISCONTINUED | OUTPATIENT
Start: 2021-03-23 | End: 2021-03-23

## 2021-03-23 RX ORDER — METOPROLOL TARTRATE 1 MG/ML
1-2 INJECTION, SOLUTION INTRAVENOUS EVERY 5 MIN PRN
Status: DISCONTINUED | OUTPATIENT
Start: 2021-03-23 | End: 2021-03-24 | Stop reason: HOSPADM

## 2021-03-23 RX ADMIN — PROPOFOL 150 MCG/KG/MIN: 10 INJECTION, EMULSION INTRAVENOUS at 09:12

## 2021-03-23 RX ADMIN — SODIUM CHLORIDE, SODIUM LACTATE, POTASSIUM CHLORIDE, CALCIUM CHLORIDE: 600; 310; 30; 20 INJECTION, SOLUTION INTRAVENOUS at 09:10

## 2021-03-23 RX ADMIN — FENTANYL CITRATE 50 MCG: 50 INJECTION, SOLUTION INTRAMUSCULAR; INTRAVENOUS at 09:24

## 2021-03-23 RX ADMIN — FENTANYL CITRATE 50 MCG: 50 INJECTION, SOLUTION INTRAMUSCULAR; INTRAVENOUS at 09:12

## 2021-03-23 RX ADMIN — KETOROLAC TROMETHAMINE 30 MG: 30 INJECTION, SOLUTION INTRAMUSCULAR; INTRAVENOUS at 09:41

## 2021-03-23 RX ADMIN — ONDANSETRON 4 MG: 2 INJECTION INTRAMUSCULAR; INTRAVENOUS at 09:38

## 2021-03-23 RX ADMIN — ACETAMINOPHEN 975 MG: 325 TABLET ORAL at 08:52

## 2021-03-23 NOTE — ANESTHESIA CARE TRANSFER NOTE
Patient: Lindsay Yuen    Procedure(s):  Diagnostic Hysteroscopy with biopsy    Diagnosis: Post-menopause bleeding [N95.0]  Diagnosis Additional Information: No value filed.    Anesthesia Type:   MAC     Note:    Oropharynx: oropharynx clear of all foreign objects  Level of Consciousness: drowsy  Oxygen Supplementation: room air    Independent Airway: airway patency satisfactory and stable  Dentition: dentition unchanged  Vital Signs Stable: post-procedure vital signs reviewed and stable  Report to RN Given: handoff report given  Patient transferred to: Phase II    Handoff Report: Identifed the Patient, Identified the Reponsible Provider, Reviewed the pertinent medical history, Discussed the surgical course, Reviewed Intra-OP anesthesia mangement and issues during anesthesia, Set expectations for post-procedure period and Allowed opportunity for questions and acknowledgement of understanding      Vitals: (Last set prior to Anesthesia Care Transfer)  CRNA VITALS  3/23/2021 0911 - 3/23/2021 0942      3/23/2021             Pulse:  62    SpO2:  94 %        Electronically Signed By: TAMIKA NORTON CRNA  March 23, 2021  9:42 AM

## 2021-03-23 NOTE — ANESTHESIA PREPROCEDURE EVALUATION
Anesthesia Pre-Procedure Evaluation    Patient: Lindsay Yuen   MRN: [id number] : 1955        Preoperative Diagnosis: Post-menopause bleeding [N95.0]   Procedure : Procedure(s):  Diagnostic Hysteroscopy with biopsy     Past Medical History:   Diagnosis Date     Abnormal Pap smear     cryo, nl paps since     Glaucoma 2020    left eye      HTN (hypertension)      Hyperlipidemia LDL goal < 130       Past Surgical History:   Procedure Laterality Date     CHOLECYSTECTOMY, LAPOROSCOPIC             Allergies   Allergen Reactions     Triamterene Hives      Social History     Tobacco Use     Smoking status: Never Smoker     Smokeless tobacco: Never Used     Tobacco comment: no smokers in the [facility]   Substance Use Topics     Alcohol use: Yes     Comment: occasional      Wt Readings from Last 1 Encounters:   03/15/21 94.8 kg (209 lb)        Anesthesia Evaluation   Pt has had prior anesthetic. Type: General.    History of anesthetic complications       ROS/MED HX  ENT/Pulmonary:  - neg pulmonary ROS     Neurologic:  - neg neurologic ROS     Cardiovascular:     (+) Dyslipidemia hypertension-----    METS/Exercise Tolerance: >4 METS    Hematologic:  - neg hematologic  ROS     Musculoskeletal:  - neg musculoskeletal ROS     GI/Hepatic:  - neg GI/hepatic ROS     Renal/Genitourinary:  - neg Renal ROS     Endo:  - neg endo ROS     Psychiatric/Substance Use:     (+) psychiatric history depression     Infectious Disease:  - neg infectious disease ROS     Malignancy:  - neg malignancy ROS     Other:  - neg other ROS          Physical Exam    Airway  airway exam normal      Mallampati: I   TM distance: > 3 FB   Neck ROM: full   Mouth opening: > 3 cm    Respiratory Devices and Support         Dental  no notable dental history         Cardiovascular   cardiovascular exam normal       Rhythm and rate: regular and normal     Pulmonary   pulmonary exam normal        breath sounds clear to auscultation           OUTSIDE  LABS:  CBC:   Lab Results   Component Value Date    WBC 17.7 (H) 03/05/2014    WBC 6.6 04/19/2009    HGB 15.8 (H) 03/05/2014    HGB 14.5 04/19/2009    HCT 46.2 03/05/2014    HCT 41.5 04/19/2009     03/05/2014     04/19/2009     BMP:   Lab Results   Component Value Date     03/15/2021     09/09/2020    POTASSIUM 3.2 (L) 03/15/2021    POTASSIUM 3.8 09/09/2020    CHLORIDE 104 03/15/2021    CHLORIDE 107 09/09/2020    CO2 31 03/15/2021    CO2 29 09/09/2020    BUN 17 03/15/2021    BUN 23 09/09/2020    CR 0.53 03/15/2021    CR 0.62 09/09/2020    GLC 95 03/15/2021    GLC 97 09/09/2020     COAGS: No results found for: PTT, INR, FIBR  POC: No results found for: BGM, HCG, HCGS  HEPATIC:   Lab Results   Component Value Date    ALBUMIN 3.9 09/16/2015    PROTTOTAL 7.7 09/16/2015    ALT 36 09/16/2015    AST 26 09/16/2015    ALKPHOS 65 09/16/2015    BILITOTAL 0.6 09/16/2015     OTHER:   Lab Results   Component Value Date    JOSESITO 9.2 03/15/2021    PHOS 4.0 11/21/2012       Anesthesia Plan    ASA Status:  2   NPO Status:  NPO Appropriate    Anesthesia Type: MAC.     - Reason for MAC: Deep or markedly invasive procedure (G8)   Induction: Propofol.   Maintenance: N/A.        Consents    Anesthesia Plan(s) and associated risks, benefits, and realistic alternatives discussed. Questions answered and patient/representative(s) expressed understanding.     - Discussed with:  Patient    Use of blood products discussed: No .     Postoperative Care    Pain management: Multi-modal analgesia, Oral pain medications.   PONV prophylaxis: Ondansetron (or other 5HT-3), Dexamethasone or Solumedrol     Comments:                Samuel Bernal DO

## 2021-03-23 NOTE — PROCEDURES
Procedure:  Diagnostic Hysteroscopy with biopsy    Surgeon: Deny Tapia MD FACOG  Assist:  [facility] staff  Anesthesia:  Sedation/local  Findings: 2 large polypoid structures, otherwise normal endometrial cavity  Procedure: The patient was taken to the operating room where anesthesia was induced and found to be adequate.  She was then placed in dorsal lithotomy position and prepped and draped in a sterile fashion.    The cervix was then visualized and grasped with a single toothed tenaculum.  The cervix was infiltrated with 0.5% marcaine with epi.  The uterus was sounded to a depth of 7 cm and dilated to accept a 30 degree hysteroscope.  The endometrial cavity is distended using normal saline and visualized.    There are 2 large >1 cm polypoid structures noted.  The remainder of the endometrial lining is thin.  The myosure device is inserted and the polypoid structures are removed without difficulty.    Good hemostasis was noted.  The cavity was then inspected and noted to be otherwise normal.   The scope and tenaculum are removed.  The patient tolerated the procedure well.  Sponge, lap and needle counts are correct X2  Specimen:  Endometrial biopsy  EBL:  5cc  Fluid balance: 100 ml NaCl  The patient was taken to the recovery room in stable condition.

## 2021-03-23 NOTE — ANESTHESIA POSTPROCEDURE EVALUATION
Patient: Lindsay Yuen    Procedure(s):  Diagnostic Hysteroscopy with biopsy    Diagnosis:Post-menopause bleeding [N95.0]  Diagnosis Additional Information: No value filed.    Anesthesia Type:  MAC    Note:  Disposition: Outpatient   Postop Pain Control: Uneventful            Sign Out: Well controlled pain   PONV: No   Neuro/Psych: Uneventful            Sign Out: Acceptable/Baseline neuro status   Airway/Respiratory: Uneventful            Sign Out: Acceptable/Baseline resp. status   CV/Hemodynamics: Uneventful            Sign Out: Acceptable CV status   Other NRE: NONE   DID A NON-ROUTINE EVENT OCCUR? No         Last vitals:  Vitals:    03/23/21 0945 03/23/21 1000 03/23/21 1007   BP: 115/48 123/47 122/49   Resp: 16 16 16   Temp: 96.9  F (36.1  C) 97  F (36.1  C)    SpO2: 98% 94% 94%       Last vitals prior to Anesthesia Care Transfer:  CRNA VITALS  3/23/2021 0911 - 3/23/2021 1011      3/23/2021             Pulse:  62    SpO2:  94 %          Electronically Signed By: Samuel Bernal DO  March 23, 2021  11:18 AM

## 2021-03-23 NOTE — DISCHARGE INSTRUCTIONS
[facility]  Same-Day Surgery   Adult Discharge Orders & Instructions   For 24 hours after surgery  1. Get plenty of rest.  A responsible adult must stay with you for at least 24 hours after you leave the hospital.   2. Do not drive or use heavy equipment.  If you have weakness or tingling, don't drive or use heavy equipment until this feeling goes away.  3. Do not drink alcohol.  4. Avoid strenuous or risky activities.  Ask for help when climbing stairs.   5. You may feel lightheaded.  IF so, sit for a few minutes before standing.  Have someone help you get up.   6. If you have nausea (feel sick to your stomach): Drink only clear liquids such as apple juice, ginger ale, broth or 7-Up.  Rest may also help.  Be sure to drink enough fluids.  Move to a regular diet as you feel able.  7. You may have a slight fever. Call the doctor if your fever is over 100 F (37.7 C) (taken under the tongue) or lasts longer than 24 hours.  8. You may have a dry mouth, a sore throat, muscle aches or trouble sleeping.  These should go away after 24 hours.  9. Do not make important or legal decisions.   Call your doctor for any of the followin.  Signs of infection (fever, growing tenderness at the surgery site, a large amount of drainage or bleeding, severe pain, foul-smelling drainage, redness, swelling).    2. It has been over 8 to 10 hours since surgery and you are still not able to urinate (pass water).    3.  Headache for over 24 hours.    4.  Numbness, tingling or weakness the day after surgery (if you had spinal anesthesia).  Gynecology Outpatient Post-operative Instructions    1.Dressing (if present) may be removed tomorrow.  Leave incision uncovered.    2.You may shower as normal tomorrow.    3. You may eat as tolerated today.    4. You may take ibuprofen over the counter as tolerated.    5. Tomorrow, lifting and physical activity as tolerated.    6.  You may drive when you are no longer requiring  narcotic pain medication.    7. You may return to work/school when you feel able.    8.  Please contact my office with any problems.    9.  I would like to touch base in 2 weeks.  Please either send me a MyCNtiretyt message or call our office and let us know how you are doing.

## 2021-03-24 LAB — COPATH REPORT: NORMAL

## 2021-03-27 ASSESSMENT — MIFFLIN-ST. JEOR: SCORE: 1482.99

## 2021-03-28 ENCOUNTER — ANESTHESIA - HEALTHEAST (OUTPATIENT)
Dept: SURGERY | Facility: HOSPITAL | Age: 66
End: 2021-03-28

## 2021-03-28 ENCOUNTER — COMMUNICATION - HEALTHEAST (OUTPATIENT)
Dept: SCHEDULING | Facility: CLINIC | Age: 66
End: 2021-03-28

## 2021-03-28 ASSESSMENT — MIFFLIN-ST. JEOR: SCORE: 1429.92

## 2021-03-29 ENCOUNTER — SURGERY - HEALTHEAST (OUTPATIENT)
Dept: SURGERY | Facility: HOSPITAL | Age: 66
End: 2021-03-29

## 2021-03-30 ASSESSMENT — MIFFLIN-ST. JEOR: SCORE: 1393.63

## 2021-03-31 ASSESSMENT — MIFFLIN-ST. JEOR: SCORE: 1449.87

## 2021-04-01 ASSESSMENT — MIFFLIN-ST. JEOR: SCORE: 1440.35

## 2021-04-02 ENCOUNTER — PATIENT OUTREACH (OUTPATIENT)
Dept: NURSING | Facility: CLINIC | Age: 66
End: 2021-04-02
Payer: COMMERCIAL

## 2021-04-02 DIAGNOSIS — Z71.89 OTHER SPECIFIED COUNSELING: Primary | Chronic | ICD-10-CM

## 2021-04-02 SDOH — ECONOMIC STABILITY: INCOME INSECURITY: HOW HARD IS IT FOR YOU TO PAY FOR THE VERY BASICS LIKE FOOD, HOUSING, MEDICAL CARE, AND HEATING?: NOT HARD AT ALL

## 2021-04-02 SDOH — ECONOMIC STABILITY: FOOD INSECURITY: WITHIN THE PAST 12 MONTHS, THE FOOD YOU BOUGHT JUST DIDN'T LAST AND YOU DIDN'T HAVE MONEY TO GET MORE.: NEVER TRUE

## 2021-04-02 SDOH — ECONOMIC STABILITY: TRANSPORTATION INSECURITY
IN THE PAST 12 MONTHS, HAS LACK OF TRANSPORTATION KEPT YOU FROM MEETINGS, WORK, OR FROM GETTING THINGS NEEDED FOR DAILY LIVING?: NO

## 2021-04-02 SDOH — ECONOMIC STABILITY: FOOD INSECURITY: WITHIN THE PAST 12 MONTHS, YOU WORRIED THAT YOUR FOOD WOULD RUN OUT BEFORE YOU GOT MONEY TO BUY MORE.: NEVER TRUE

## 2021-04-02 SDOH — ECONOMIC STABILITY: TRANSPORTATION INSECURITY
IN THE PAST 12 MONTHS, HAS THE LACK OF TRANSPORTATION KEPT YOU FROM MEDICAL APPOINTMENTS OR FROM GETTING MEDICATIONS?: NO

## 2021-04-02 NOTE — PROGRESS NOTES
Clinic Care Coordination Contact    Clinic Care Coordination Contact  OUTREACH    Referral Information:  Referral Source: IP Report    Primary Diagnosis: Cardiovascular - other    Chief Complaint   Patient presents with     Clinic Care Coordination - Post Hospital     CC RN        [place] Utilization: [facility]  Clinic Utilization  Difficulty keeping appointments:: No  Compliance Concerns: No  No-Show Concerns: No  No PCP office visit in Past Year: No  Utilization    Last refreshed: 4/2/2021 10:05 AM: Hospital Admissions 0           Last refreshed: 4/2/2021 10:05 AM: ED Visits 0           Last refreshed: 4/2/2021 10:05 AM: No Show Count (past year) 1              Current as of: 4/2/2021 10:05 AM              Clinical Concerns:  Current Medical Concerns:  Patient was seen at [facility] 3/27 to 4/1 for Flash pulmonary edema (H)    Elevated brain natriuretic peptide (BNP) level    Acute respiratory failure with hypoxia (H)    Shoulder dislocation, right, initial encounter    Patient reports her first night home was uneventful. Patient denies pain. She has a copy of her discharge summary. She denies barriers to care (transportation, finances, groceries). Home care has not yet contacted her, she is awaiting the call for start of care. Patient staying with brother. Patient has follow up with primary care, [facility], and cardiology next week. She denies a need for ongoing care coordination services.    Current Behavioral Concerns: na  Education Provided to patient: introduced self, role and care coordination services.    Pain  Pain (GOAL):: No  Health Maintenance Reviewed: Not assessed  Clinical Pathway: na    Medication Management:  Medication reconciliation status: patient is taking medications per discharge summary. Patient to continue regimen without change.       Functional Status:  Dependent ADLs:: Ambulation-cane    Living Situation:  Current living arrangement:: I live  alone  Type of residence:: Private home - no stairs    Lifestyle & Psychosocial Needs:     Social Needs     Financial resource strain: Not hard at all     Food insecurity     Worry: Never true     Inability: Never true     Transportation needs     Medical: No     Non-medical: No        Transportation means:: Regular car, Family     Informal Support system:: Family   Socioeconomic History     Marital status:      Spouse name: Agusto     Number of children: 2     Years of education: Not on file     Highest education level: Not on file   Occupational History     Occupation:  provider     Employer: [employer] [id number]     Tobacco Use     Smoking status: Never Smoker     Smokeless tobacco: Never Used     Tobacco comment: no smokers in the [facility]   Substance and Sexual Activity     Alcohol use: Yes     Comment: occasional     Drug use: No     Sexual activity: Yes     Partners: Male     Comment: post menopausal      Resources and Interventions:  Current Resources:   Skilled Home Care Services: Occupational Therapy, Physicial Therapy  Community Resources: Home Care     Equipment Currently Used at Home: cane, straight, raised toilet sea   Goals: na  Patient/Caregiver understanding: yes     Future Appointments              In 4 days [facility] [place]          Plan: 1. Patient verbalized good understanding of discharge summary and will follow discharge summary advice.  2. Care Coordination goals not identified at this time. Patient may be referred to Care Coordination in the future if additional needs arise.    3. Patient encouraged to contact the clinic if situation changes and assistance is needed.   4. No follow-up planned.    Luz Maria Marie, RN, BSN, PHN Care Coordinator  [facility]   Phone: [phone]

## 2021-04-05 NOTE — PROGRESS NOTES
Assessment & Plan     Hospital discharge follow-up    Flash pulmonary edema (H)  Thought to be related to acute diastolic failure  Echo 3/28 normal EF  Was diuresed during admission.   Denies any symptoms today.   Continue furosemide.   Keep follow-up with cardiology scheduled for tomorrow.     Acute diastolic heart failure (H)  See above.   Check BMP to ensure stable.   - Basic metabolic panel    Acute kidney injury (H)  Resolved by time of discharge.   Check BMP.   - Basic metabolic panel    Hypertension goal BP (blood pressure) < 140/90  BP stable.   Note that hydrochlorothiazide was discontinued, furosemide added.   Continue losartan 100 mg.   - Basic metabolic panel    Shoulder dislocation, right, subsequent encounter  Needs to follow-up with ortho, per discharge summary should see Dr. Mckeon in 2-3 weeks.   Patient will call [facility] to schedule follow-up, she will also clarify how long immobilizer is to be worn.       Review of prior external note(s) from [facility] information from [facility] reviewed  Review of the result(s) of each unique test - imaging, admission labs         See Patient Instructions  Patient Instructions   Discharge summary states you should see Dr. Mckeon with [facility] for follow-up in 2-3 weeks, please schedule this.  Ask to send message to his nurse to clarify how long you are supposed to wear the immobilizer and if you should be doing PT.  I can place PT orders, but would need to know what your limitations are.     We will check labs today to make sure you kidney function and electrolytes are stable.  Continue your new medication (furosemide) until seen by cardiology.  Watch you weight- call if 3 lb weight gain over night or 5 lbs in a week.  Call with any swelling in your legs or new shortness of breath.               Return in about 6 months (around 10/6/2021).    Elvira Smith, CNP  [facility]    Christoph Montoya is a 65 year old who  presents for the following health issues     HPI       Hospital Follow-up Visit:    Hospital/Nursing Home/IP Rehab Facility: [facility]  Date of Admission: 3/27/2021  Date of Discharge: 4/1/2021  Reason(s) for Admission: Acute respiratory failure with hypoxia (H) (Primary Dx);   Flash pulmonary edema (H);   Elevated brain natriuretic peptide (BNP) level;   Shoulder dislocation, right, initial encounter      Was your hospitalization related to COVID-19? No   Problems taking medications regularly:  None  Medication changes since discharge: None  Problems adhering to non-medication therapy:  None    Summary of hospitalization:  [facility] hospital discharge summary reviewed    SUMMARY OF HOSPITAL COURSE:     65-year-old female with history of hypertension, dyslipidemia, depression presents with right shoulder dislocation and flash pulmonary edema     Acute flash pulmonary edema: Suspect secondary to acute diastolic CHF. TTE shows unchanged LV EF that is normal. CTA on admission shows no PE and evidence of pulmonary edema.  --Patient has improved oxygen requirement with aggressive diuresis  --Discharge home with Lasix 40 mg daily in place of home hydrochlorothiazide  --Continue home losartan, statin  --Outpatient CHF follow-up     MAYELIN: Resolved. Suspect multifactorial etiology of contrast-induced nephropathy in the setting of aggressive diuresis and ARB therapy. Renal US negative. UA not collected  --Okay to resume losartan after discharge  --Follow GFR as outpatient    Right shoulder dislocation: Status post closed reduction and immobilizer application performed 3/29/2021  --Follow-up per Ortho     Acute hypoxemic respiratory failure: Thought secondary to flash pulmonary edema/CHF exacerbation. Improved with IV diuresis  --Diuretic management discussed above     Acute diastolic CHF:   --Patient has improved oxygen requirement with aggressive diuresis  --Discharge home with Lasix 40 mg daily  "in place of home hydrochlorothiazide  --Continue home losartan, statin  --Outpatient CHF follow-up     Transaminitis: Resolved. Suspect secondary to [place] versus congestive hepatopathy     Hypokalemia: Replaced     Depression/bereavement: Patient notes issues with recent depression   -- outpatient management     Hyperlipidemia: Continue home statin       Diagnostic Tests/Treatments reviewed.  Follow up needed: Check BMP, follow-up with ortho and cardiology.   Other Healthcare Providers Involved in Patient s Care:         None  Update since discharge: improved.   Denies any shortness of breath, chest pain or lower extremity edema.   Checking daily weights.   Tolerating lasix without apparent side effects.    Has appointment with cardiology tomorrow.   She is not sure if she should see ortho or not.  She tried to make an appointment at [place] but the  told her to check with PCP first.  Discharge summary states she should see ortho in 2-3 weeks.  She is still wearing shoulder immobilizer and wasn't instructed on how long it should be worn.         Post Discharge Medication Reconciliation: discharge medications reconciled, continue medications without change.  Plan of care communicated with patient                  Review of Systems   Constitutional, HEENT, cardiovascular, pulmonary, gi and gu systems are negative, except as otherwise noted.      Objective    /80   Pulse 68   Temp 96.3  F (35.7  C)   Ht 1.6 m (5' 3\")   Wt 90.7 kg (200 lb)   SpO2 96%   BMI 35.43 kg/m    Body mass index is 35.43 kg/m .  Physical Exam   GENERAL: healthy, alert and no distress  RESP: lungs clear to auscultation - no rales, rhonchi or wheezes  CV: regular rate and rhythm, normal S1 S2, no S3 or S4, no murmur, click or rub, no peripheral edema and peripheral pulses strong  MS: no gross musculoskeletal defects noted, no edema.  Right arm in shoulder immobilizer.     SKIN: no suspicious lesions or rashes  NEURO: Normal " strength and tone, mentation intact and speech normal      Echo 3/28/21- [facility]    1.Left ventricle ejection fraction is normal. The calculated left   ventricular ejection fraction is 75%.    2.Normal right ventricular size and systolic function.    3.Mild left atrial chamber enlargement.    4.No hemodynamically significant valvular heart abnormalities.    5.No previous study for comparison.    CT Chest 3/27/21-[facility].   1.  No acute pulmonary embolism or aortopathy.   2.  Extensive mixed alveolar and interstitial lung opacities consistent with acute lung edema.  3.  Minimal symmetric pleural effusions.  4.  Anterior right shoulder dislocation. No associated fracture detected.    Labs reviewed in Care Everywhere.   Labs from today pending.

## 2021-04-06 ENCOUNTER — OFFICE VISIT (OUTPATIENT)
Dept: FAMILY MEDICINE | Facility: CLINIC | Age: 66
End: 2021-04-06
Payer: COMMERCIAL

## 2021-04-06 ENCOUNTER — TELEPHONE (OUTPATIENT)
Dept: FAMILY MEDICINE | Facility: CLINIC | Age: 66
End: 2021-04-06

## 2021-04-06 VITALS
DIASTOLIC BLOOD PRESSURE: 80 MMHG | OXYGEN SATURATION: 96 % | BODY MASS INDEX: 35.44 KG/M2 | TEMPERATURE: 96.3 F | WEIGHT: 200 LBS | SYSTOLIC BLOOD PRESSURE: 130 MMHG | HEART RATE: 68 BPM | HEIGHT: 63 IN

## 2021-04-06 DIAGNOSIS — F32.3 MAJOR DEPRESSIVE DISORDER, SINGLE EPISODE, SEVERE WITH PSYCHOTIC FEATURES (H): Primary | ICD-10-CM

## 2021-04-06 DIAGNOSIS — Z09 HOSPITAL DISCHARGE FOLLOW-UP: Primary | ICD-10-CM

## 2021-04-06 DIAGNOSIS — N17.9 ACUTE KIDNEY INJURY (H): ICD-10-CM

## 2021-04-06 DIAGNOSIS — I10 HYPERTENSION GOAL BP (BLOOD PRESSURE) < 140/90: ICD-10-CM

## 2021-04-06 DIAGNOSIS — S43.004D SHOULDER DISLOCATION, RIGHT, SUBSEQUENT ENCOUNTER: ICD-10-CM

## 2021-04-06 DIAGNOSIS — J81.0 FLASH PULMONARY EDEMA (H): ICD-10-CM

## 2021-04-06 DIAGNOSIS — I50.31 ACUTE DIASTOLIC HEART FAILURE (H): ICD-10-CM

## 2021-04-06 PROBLEM — N84.0 ABNORMAL UTERINE BLEEDING DUE TO ENDOMETRIAL POLYP: Status: ACTIVE | Noted: 2021-03-27

## 2021-04-06 PROBLEM — N93.9 ABNORMAL UTERINE BLEEDING DUE TO ENDOMETRIAL POLYP: Status: ACTIVE | Noted: 2021-03-27

## 2021-04-06 LAB
ANION GAP SERPL CALCULATED.3IONS-SCNC: 3 MMOL/L (ref 3–14)
BUN SERPL-MCNC: 21 MG/DL (ref 7–30)
CALCIUM SERPL-MCNC: 9.1 MG/DL (ref 8.5–10.1)
CHLORIDE SERPL-SCNC: 108 MMOL/L (ref 94–109)
CO2 SERPL-SCNC: 28 MMOL/L (ref 20–32)
CREAT SERPL-MCNC: 0.66 MG/DL (ref 0.52–1.04)
GFR SERPL CREATININE-BSD FRML MDRD: >90 ML/MIN/{1.73_M2}
GLUCOSE SERPL-MCNC: 89 MG/DL (ref 70–99)
POTASSIUM SERPL-SCNC: 3.6 MMOL/L (ref 3.4–5.3)
SODIUM SERPL-SCNC: 139 MMOL/L (ref 133–144)

## 2021-04-06 PROCEDURE — 80048 BASIC METABOLIC PNL TOTAL CA: CPT | Performed by: NURSE PRACTITIONER

## 2021-04-06 PROCEDURE — 36415 COLL VENOUS BLD VENIPUNCTURE: CPT | Performed by: NURSE PRACTITIONER

## 2021-04-06 PROCEDURE — 99495 TRANSJ CARE MGMT MOD F2F 14D: CPT | Performed by: NURSE PRACTITIONER

## 2021-04-06 RX ORDER — FUROSEMIDE 40 MG
40 TABLET ORAL DAILY
COMMUNITY
Start: 2021-04-01 | End: 2021-09-09

## 2021-04-06 RX ORDER — FAMOTIDINE 20 MG/1
20 TABLET, FILM COATED ORAL 2 TIMES DAILY
COMMUNITY
Start: 2021-04-01 | End: 2021-04-06

## 2021-04-06 ASSESSMENT — MIFFLIN-ST. JEOR: SCORE: 1416.32

## 2021-04-06 NOTE — LETTER
April 7, 2021      Lala Islassalina  [address] [facility] [address]        Dear ,    We are writing to inform you of your test results.    Your test results fall within the expected range(s) or remain unchanged from previous results.  Please continue with current treatment plan.    Resulted Orders   Basic metabolic panel   Result Value Ref Range    Sodium 139 133 - 144 mmol/L    Potassium 3.6 3.4 - 5.3 mmol/L    Chloride 108 94 - 109 mmol/L    Carbon Dioxide 28 20 - 32 mmol/L    Anion Gap 3 3 - 14 mmol/L    Glucose 89 70 - 99 mg/dL      Comment:      Fasting specimen    Urea Nitrogen 21 7 - 30 mg/dL    Creatinine 0.66 0.52 - 1.04 mg/dL    GFR Estimate >90 >60 mL/min/[1.73_m2]      Comment:      Non  GFR Calc  Starting 12/18/2018, serum creatinine based estimated GFR (eGFR) will be   calculated using the Chronic Kidney Disease Epidemiology Collaboration   (CKD-EPI) equation.      GFR Estimate If Black >90 >60 mL/min/[1.73_m2]      Comment:       GFR Calc  Starting 12/18/2018, serum creatinine based estimated GFR (eGFR) will be   calculated using the Chronic Kidney Disease Epidemiology Collaboration   (CKD-EPI) equation.      Calcium 9.1 8.5 - 10.1 mg/dL       If you have any questions or concerns, please call the clinic at the number listed above.       Sincerely,      Elvira Smith, CNP/sp

## 2021-04-06 NOTE — TELEPHONE ENCOUNTER
Left message on answering machine for patient/parent to call back.   [phone].  See provider response below.   Denise Norris RN

## 2021-04-06 NOTE — TELEPHONE ENCOUNTER
.Reason for call:  Medication   If this is a refill request, has the caller requested the refill from the pharmacy already? No  Will the patient be using a [facility]? No  Name of the pharmacy and phone number for the current request: [facility] in [place]    Name of the medication requested: gabapentin    Other request: please send script to [facility] pharmacy. disregard other refill. Pt wants this sent to [facility] in [place]    Phone number to reach patient:  Home number on file [phone] (home)    Best Time:  anytime    Can we leave a detailed message on this number?  YES    Travel screening: Negative

## 2021-04-06 NOTE — TELEPHONE ENCOUNTER
Gabapentin medication is listed as historical.   Patient saw Elivra Smith NP today. Will you be refilling medication?   Please advise  Pharmacy is pended.

## 2021-04-06 NOTE — TELEPHONE ENCOUNTER
Who has been prescribing this medication in the past?   Would defer to PCP or whoever has prescribed it in the past.    I saw patient for hospital follow-up only, did not discuss gabapentin use at all.     Elvira Smith, CNP

## 2021-04-06 NOTE — TELEPHONE ENCOUNTER
Provider:  Are you willing to send refills of the gabapentin?  When would you like to see her back?  Thank you. Eneida Fuller R.N.    Patient states she received this medication when she was at [facility] and saw Cecilia Horton. It was given for depression/stress. She reports she takes Gabapentin 300 mg - 1 tablet 3 times a day. She states that she received other medications from Cecilia Horton that Dr. Marroquin had taken over and sent refills.

## 2021-04-06 NOTE — PATIENT INSTRUCTIONS
Discharge summary states you should see Dr. Mckeon with [facility] for follow-up in 2-3 weeks, please schedule this.  Ask to send message to his nurse to clarify how long you are supposed to wear the immobilizer and if you should be doing PT.  I can place PT orders, but would need to know what your limitations are.     We will check labs today to make sure you kidney function and electrolytes are stable.  Continue your new medication (furosemide) until seen by cardiology.  Watch you weight- call if 3 lb weight gain over night or 5 lbs in a week.  Call with any swelling in your legs or new shortness of breath.

## 2021-04-06 NOTE — TELEPHONE ENCOUNTER
Left message on answering machine for patient/parent to call back.   [phone]  Will need to know who prescribe this medication in the past?  What are they taking for?   Medication is historical, dosing needs to be verified.   Denise Norris RN

## 2021-04-07 ENCOUNTER — OFFICE VISIT - HEALTHEAST (OUTPATIENT)
Dept: CARDIOLOGY | Facility: CLINIC | Age: 66
End: 2021-04-07

## 2021-04-07 DIAGNOSIS — I10 ESSENTIAL HYPERTENSION: ICD-10-CM

## 2021-04-07 DIAGNOSIS — I50.32 CHRONIC HEART FAILURE WITH PRESERVED EJECTION FRACTION (H): ICD-10-CM

## 2021-04-07 RX ORDER — GABAPENTIN 300 MG/1
300 CAPSULE ORAL 3 TIMES DAILY
Qty: 270 CAPSULE | Refills: 1 | Status: SHIPPED | OUTPATIENT
Start: 2021-04-07 | End: 2021-12-12

## 2021-04-07 ASSESSMENT — MIFFLIN-ST. JEOR: SCORE: 1445.34

## 2021-04-19 ENCOUNTER — TRANSFERRED RECORDS (OUTPATIENT)
Dept: HEALTH INFORMATION MANAGEMENT | Facility: CLINIC | Age: 66
End: 2021-04-19

## 2021-04-27 ENCOUNTER — COMMUNICATION - HEALTHEAST (OUTPATIENT)
Dept: CARDIOLOGY | Facility: CLINIC | Age: 66
End: 2021-04-27

## 2021-04-27 DIAGNOSIS — J96.01 ACUTE RESPIRATORY FAILURE WITH HYPOXIA (H): ICD-10-CM

## 2021-04-29 ENCOUNTER — DOCUMENTATION ONLY (OUTPATIENT)
Dept: OTHER | Facility: CLINIC | Age: 66
End: 2021-04-29

## 2021-05-18 ENCOUNTER — OFFICE VISIT - HEALTHEAST (OUTPATIENT)
Dept: CARDIOLOGY | Facility: CLINIC | Age: 66
End: 2021-05-18

## 2021-05-18 DIAGNOSIS — I50.32 CHRONIC DIASTOLIC HEART FAILURE (H): ICD-10-CM

## 2021-05-18 ASSESSMENT — MIFFLIN-ST. JEOR: SCORE: 1449.87

## 2021-05-19 DIAGNOSIS — F41.9 ANXIETY: ICD-10-CM

## 2021-05-19 DIAGNOSIS — F32.3 MAJOR DEPRESSIVE DISORDER, SINGLE EPISODE, SEVERE WITH PSYCHOTIC FEATURES (H): ICD-10-CM

## 2021-05-19 RX ORDER — ARIPIPRAZOLE 5 MG/1
5 TABLET ORAL EVERY MORNING
Qty: 90 TABLET | Refills: 0 | Status: SHIPPED | OUTPATIENT
Start: 2021-05-19 | End: 2021-07-29

## 2021-05-19 NOTE — TELEPHONE ENCOUNTER
Routing refill request to provider for review/approval because:  Labs not current:  CBC      Edelmira Rainey RN

## 2021-05-27 ENCOUNTER — TELEPHONE (OUTPATIENT)
Dept: FAMILY MEDICINE | Facility: CLINIC | Age: 66
End: 2021-05-27

## 2021-05-27 VITALS
HEIGHT: 64 IN | DIASTOLIC BLOOD PRESSURE: 60 MMHG | RESPIRATION RATE: 16 BRPM | HEART RATE: 68 BPM | SYSTOLIC BLOOD PRESSURE: 92 MMHG | BODY MASS INDEX: 35 KG/M2 | WEIGHT: 205 LBS

## 2021-05-27 DIAGNOSIS — Z11.52 ENCOUNTER FOR SCREENING FOR COVID-19: Primary | ICD-10-CM

## 2021-05-27 NOTE — TELEPHONE ENCOUNTER
.Reason for call:  Order   Order or referral being requested: covid test  Reason for request: surgery  Date needed: as soon as possible  Has the patient been seen by the PCP for this problem? YES    Additional comments: pt is requesting a covid test. The place where she is getting surgery does not provide these orders and states this should come from the provider. Please call pt to schedule once complete.    Phone number to reach patient:  Home number on file [phone] (home)    Best Time:  anytime    Can we leave a detailed message on this number?  YES    Travel screening: Negative

## 2021-05-28 NOTE — TELEPHONE ENCOUNTER
DOS: 6/9/21, no symptoms. FYI to provider, patient has had both covid vaccines. Claritza Joshi [facility]

## 2021-05-31 ENCOUNTER — TELEPHONE (OUTPATIENT)
Dept: FAMILY MEDICINE | Facility: CLINIC | Age: 66
End: 2021-05-31

## 2021-06-01 NOTE — PATIENT INSTRUCTIONS

## 2021-06-01 NOTE — PROGRESS NOTES
[facility]  [address] [facility] [address]  Phone: [phone]  Primary Provider: Colleen Vann  Pre-op Performing Provider: COLLEEN VANN      PREOPERATIVE EVALUATION:  Today's date: 6/8/2021    Lala Yuen is a 66 year old female who presents for a preoperative evaluation.    Surgical Information:  Surgery/Procedure: Right shoulder- rotator cuff repair   [facility]   Surgeon: Dr. Mckeon   Surgery Date: 6/9/21  Time of Surgery: 9:00  Where patient plans to recover: At home with family  Fax number for surgical facility:     Type of Anesthesia Anticipated: to be determined    Assessment & Plan     The proposed surgical procedure is considered INTERMEDIATE risk.    (Z01.818) Preop general physical exam  (primary encounter diagnosis)  (S46.011D) Traumatic tear of right rotator cuff, unspecified tear extent, subsequent encounter  Comment: rotator tendon tear requiring surgical repair  Plan: cleared for surgery    (I10) Hypertension goal BP (blood pressure) < 140/90  Comment: well controlled on medication  Plan: Hold losartan and furosemide the morning of surgery, take once tolerating oral meds.  Ok to hold furosemide for one full day.     (F32.3) Major depressive disorder, single episode, severe with psychotic features (H)  Comment: stable   Plan: take gabapentin, sertraline and aripiprazole the morning of surgery    (I50.32) Chronic heart failure with preserved ejection fraction (H)  (J81.0) Flash pulmonary edema (H)  Comment: stable on medications   Plan: hold furosemide and losartan the morning of surgery, take once tolerating oral meds.  Ok to hold furosemide for one full day.               Risks and Recommendations:  The patient has the following additional risks and recommendations for perioperative complications:   - No identified additional risk factors other than previously addressed    Medication  Instructions:  see above.    RECOMMENDATION:  APPROVAL GIVEN to proceed with proposed procedure, without further diagnostic evaluation.                      Subjective     HPI related to upcoming procedure:  Right shoulder rotator cuff tear requiring repair      Preop Questions 6/8/2021   1. Have you ever had a heart attack or stroke? No   2. Have you ever had surgery on your heart or blood vessels, such as a stent placement, a coronary artery bypass, or surgery on an artery in your head, neck, heart, or legs? No   3. Do you have chest pain with activity? No   4. Do you have a history of  heart failure? UNKNOWN - recent acute HF with pulmonary edema, resolved   5. Do you currently have a cold, bronchitis or symptoms of other infection? No   6. Do you have a cough, shortness of breath, or wheezing? No   7. Do you or anyone in your family have previous history of blood clots? YES - sister and mother - post-surgical   8. Do you or does anyone in your family have a serious bleeding problem such as prolonged bleeding following surgeries or cuts? No   9. Have you ever had problems with anemia or been told to take iron pills? No   10. Have you had any abnormal blood loss such as black, tarry or bloody stools, or abnormal vaginal bleeding? No   11. Have you ever had a blood transfusion? No   12. Are you willing to have a blood transfusion if it is medically needed before, during, or after your surgery? Yes   13. Have you or any of your relatives ever had problems with anesthesia? No   14. Do you have sleep apnea, excessive snoring or daytime drowsiness? No   15. Do you have any artifical heart valves or other implanted medical devices like a pacemaker, defibrillator, or continuous glucose monitor? No   16. Do you have artificial joints? No   17. Are you allergic to latex? No   18. Is there any chance that you may be pregnant? -No     Health Care Directive:  Patient does not have a Health Care Directive or Living Will:  Advance Directive received and scanned. Click on Code in the patient header to view.    Preoperative Review of :   reviewed - controlled substances reflected in medication list.      Status of Chronic Conditions:  CHF - Patient has a longstanding history of moderate-severe CHF. Exacerbating conditions include hypertension. Currently the patient's condition is same. Current treatment regimen includes Angiotensin 2 receptor blocker and diuretic. The patient denies chest pain, edema, orthopnea, SOB or recent weight gain. Last Echocardiogram 3/2021 back to baseline EF, EKG 3/2021.     HYPERLIPIDEMIA - Patient has a long history of significant Hyperlipidemia requiring medication for treatment with recent good control. Patient reports no problems or side effects with the medication.     HYPERTENSION - Patient has longstanding history of HTN , currently denies any symptoms referable to elevated blood pressure. Specifically denies chest pain, palpitations, dyspnea, orthopnea, PND or peripheral edema. Blood pressure readings have been in normal range. Current medication regimen is as listed below. Patient denies any side effects of medication.       Review of Systems  Constitutional, neuro, ENT, endocrine, pulmonary, cardiac, gastrointestinal, genitourinary, musculoskeletal, integument and psychiatric systems are negative, except as otherwise noted.    Patient Active Problem List    Diagnosis Date Noted     Chronic heart failure with preserved ejection fraction (H) 04/07/2021     Priority: Medium     Flash pulmonary edema (H) 03/27/2021     Priority: Medium     Abnormal uterine bleeding due to endometrial polyp 03/27/2021     Priority: Medium     Major depressive disorder, single episode, severe with psychotic features (H) 03/14/2021     Priority: Medium     Post-menopause bleeding 02/04/2021     Priority: Medium     Morbid obesity due to excess calories (H) 01/24/2017     Priority: Medium     Hypertension goal BP  (blood pressure) < 140/90 09/30/2015     Priority: Medium     Cervical polyp 09/20/2011     Priority: Medium     Hyperlipidemia with target LDL less than 130 08/17/2010     Priority: Medium     Diagnosis updated by automated process. Provider to review and confirm.        Past Medical History:   Diagnosis Date     Abnormal Pap smear 1985    cryo, nl paps since     Glaucoma 01/30/2020    left eye      HTN (hypertension)      Hyperlipidemia LDL goal < 130      Past Surgical History:   Procedure Laterality Date     CHOLECYSTECTOMY, LAPOROSCOPIC            OPERATIVE HYSTEROSCOPY WITH MORCELLATOR N/A 3/23/2021    Procedure: Diagnostic Hysteroscopy with biopsy;  Surgeon: Deny Tapia MD;  Location: [facility] OR     Current Outpatient Medications   Medication Sig Dispense Refill     ARIPiprazole (ABILIFY) 5 MG tablet Take 1 tablet (5 mg) by mouth every morning  90 tablet 0     aspirin (ASA) 325 MG EC tablet Take 325 mg by mouth daily With a meal       atorvastatin (LIPITOR) 10 MG tablet Take 1 tablet (10 mg) by mouth daily 90 tablet 3     CALCIUM 600 + D 600-200 MG-UNIT OR TABS 2 pill a day  3     CENTRUM SILVER OR TABS ONE DAILY 0 0     furosemide (LASIX) 40 MG tablet Take 40 mg by mouth daily       gabapentin (NEURONTIN) 300 MG capsule Take 1 capsule (300 mg) by mouth 3 times daily 270 capsule 1     hydrOXYzine (VISTARIL) 25 MG capsule Take 25 mg by mouth every 6 hours as needed       latanoprost (XALATAN) 0.005 % ophthalmic solution Place 1 drop Into the left eye At Bedtime       losartan (COZAAR) 100 MG tablet Take 1 tablet (100 mg) by mouth daily 90 tablet 1     sertraline (ZOLOFT) 50 MG tablet Take 2.5 tablets (125 mg) by mouth daily 225 tablet 1     VITAMIN E 400 UNIT OR CAPS ONE CAPSULE DAILY         Allergies   Allergen Reactions     Triamterene Hives        Social History     Tobacco Use     Smoking status: Never Smoker     Smokeless tobacco: Never Used     Tobacco comment: no smokers in the [facility]   Substance  Use Topics     Alcohol use: Yes     Comment: occasional       History   Drug Use No         Objective     /84   Pulse 77   Temp 97.6  F (36.4  C) (Oral)   Resp 20   Wt 95.1 kg (209 lb 9.6 oz)   SpO2 95%   BMI 35.98 kg/m      Physical Exam    GENERAL APPEARANCE: healthy, alert and no distress     EYES: EOMI, PERRL     HENT: ear canals and TM's normal and nose and mouth without ulcers or lesions     NECK: no adenopathy, no asymmetry, masses, or scars and thyroid normal to palpation     RESP: lungs clear to auscultation - no rales, rhonchi or wheezes     CV: regular rates and rhythm, normal S1 S2, no S3 or S4 and no murmur, click or rub     ABDOMEN:  soft, nontender, no HSM or masses and bowel sounds normal     MS: extremities normal- no gross deformities noted, no evidence of inflammation in joints, FROM in all extremities.     SKIN: no suspicious lesions or rashes     NEURO: Normal strength and tone, sensory exam grossly normal, mentation intact, speech normal and cranial nerves 2-12 intact     PSYCH: mentation appears normal. and affect normal/bright    Last Comprehensive Metabolic Panel:  Sodium   Date Value Ref Range Status   04/06/2021 139 133 - 144 mmol/L Final     Potassium   Date Value Ref Range Status   04/06/2021 3.6 3.4 - 5.3 mmol/L Final     Chloride   Date Value Ref Range Status   04/06/2021 108 94 - 109 mmol/L Final     Carbon Dioxide   Date Value Ref Range Status   04/06/2021 28 20 - 32 mmol/L Final     Anion Gap   Date Value Ref Range Status   04/06/2021 3 3 - 14 mmol/L Final     Glucose   Date Value Ref Range Status   04/06/2021 89 70 - 99 mg/dL Final     Comment:     Fasting specimen     Urea Nitrogen   Date Value Ref Range Status   04/06/2021 21 7 - 30 mg/dL Final     Creatinine   Date Value Ref Range Status   04/06/2021 0.66 0.52 - 1.04 mg/dL Final     GFR Estimate   Date Value Ref Range Status   04/06/2021 >90 >60 mL/min/[1.73_m2] Final     Comment:     Non  GFR  Calc  Starting 12/18/2018, serum creatinine based estimated GFR (eGFR) will be   calculated using the Chronic Kidney Disease Epidemiology Collaboration   (CKD-EPI) equation.       Calcium   Date Value Ref Range Status   04/06/2021 9.1 8.5 - 10.1 mg/dL Final         Diagnostics:  No labs were ordered during this visit.   No EKG this visit, completed in the last 90 days.    Revised Cardiac Risk Index (RCRI):  The patient has the following serious cardiovascular risks for perioperative complications:   - No serious cardiac risks = 0 points     RCRI Interpretation: 0 points: Class I (very low risk - 0.4% complication rate)           Signed Electronically by: Colleen Marroquin MD  Copy of this evaluation report is provided to requesting physician.

## 2021-06-05 VITALS
RESPIRATION RATE: 16 BRPM | BODY MASS INDEX: 34.83 KG/M2 | WEIGHT: 204 LBS | HEIGHT: 64 IN | HEART RATE: 64 BPM | SYSTOLIC BLOOD PRESSURE: 136 MMHG | DIASTOLIC BLOOD PRESSURE: 70 MMHG

## 2021-06-05 VITALS — HEIGHT: 64 IN | WEIGHT: 202.9 LBS | BODY MASS INDEX: 34.64 KG/M2

## 2021-06-05 DIAGNOSIS — Z11.52 ENCOUNTER FOR SCREENING FOR COVID-19: ICD-10-CM

## 2021-06-05 LAB
SARS-COV-2 RNA RESP QL NAA+PROBE: NORMAL
SPECIMEN SOURCE: NORMAL

## 2021-06-05 PROCEDURE — U0005 INFEC AGEN DETEC AMPLI PROBE: HCPCS | Performed by: FAMILY MEDICINE

## 2021-06-05 PROCEDURE — U0003 INFECTIOUS AGENT DETECTION BY NUCLEIC ACID (DNA OR RNA); SEVERE ACUTE RESPIRATORY SYNDROME CORONAVIRUS 2 (SARS-COV-2) (CORONAVIRUS DISEASE [COVID-19]), AMPLIFIED PROBE TECHNIQUE, MAKING USE OF HIGH THROUGHPUT TECHNOLOGIES AS DESCRIBED BY CMS-2020-01-R: HCPCS | Performed by: FAMILY MEDICINE

## 2021-06-06 PROBLEM — I50.32 CHRONIC HEART FAILURE WITH PRESERVED EJECTION FRACTION (H): Status: ACTIVE | Noted: 2021-04-07

## 2021-06-06 LAB
LABORATORY COMMENT REPORT: NORMAL
SARS-COV-2 RNA RESP QL NAA+PROBE: NEGATIVE
SPECIMEN SOURCE: NORMAL

## 2021-06-08 ENCOUNTER — OFFICE VISIT (OUTPATIENT)
Dept: FAMILY MEDICINE | Facility: CLINIC | Age: 66
End: 2021-06-08
Payer: COMMERCIAL

## 2021-06-08 VITALS
SYSTOLIC BLOOD PRESSURE: 134 MMHG | OXYGEN SATURATION: 95 % | RESPIRATION RATE: 20 BRPM | HEART RATE: 77 BPM | TEMPERATURE: 97.6 F | DIASTOLIC BLOOD PRESSURE: 84 MMHG | BODY MASS INDEX: 35.98 KG/M2 | WEIGHT: 209.6 LBS

## 2021-06-08 DIAGNOSIS — I10 HYPERTENSION GOAL BP (BLOOD PRESSURE) < 140/90: ICD-10-CM

## 2021-06-08 DIAGNOSIS — Z01.818 PREOP GENERAL PHYSICAL EXAM: Primary | ICD-10-CM

## 2021-06-08 DIAGNOSIS — S46.011D TRAUMATIC TEAR OF RIGHT ROTATOR CUFF, UNSPECIFIED TEAR EXTENT, SUBSEQUENT ENCOUNTER: ICD-10-CM

## 2021-06-08 DIAGNOSIS — I50.32 CHRONIC HEART FAILURE WITH PRESERVED EJECTION FRACTION (H): ICD-10-CM

## 2021-06-08 DIAGNOSIS — F32.3 MAJOR DEPRESSIVE DISORDER, SINGLE EPISODE, SEVERE WITH PSYCHOTIC FEATURES (H): ICD-10-CM

## 2021-06-08 DIAGNOSIS — J81.0 FLASH PULMONARY EDEMA (H): ICD-10-CM

## 2021-06-08 PROCEDURE — 99214 OFFICE O/P EST MOD 30 MIN: CPT | Performed by: FAMILY MEDICINE

## 2021-06-08 ASSESSMENT — ANXIETY QUESTIONNAIRES
6. BECOMING EASILY ANNOYED OR IRRITABLE: NOT AT ALL
GAD7 TOTAL SCORE: 2
1. FEELING NERVOUS, ANXIOUS, OR ON EDGE: SEVERAL DAYS
7. FEELING AFRAID AS IF SOMETHING AWFUL MIGHT HAPPEN: NOT AT ALL
IF YOU CHECKED OFF ANY PROBLEMS ON THIS QUESTIONNAIRE, HOW DIFFICULT HAVE THESE PROBLEMS MADE IT FOR YOU TO DO YOUR WORK, TAKE CARE OF THINGS AT HOME, OR GET ALONG WITH OTHER PEOPLE: NOT DIFFICULT AT ALL
5. BEING SO RESTLESS THAT IT IS HARD TO SIT STILL: NOT AT ALL
3. WORRYING TOO MUCH ABOUT DIFFERENT THINGS: NOT AT ALL
2. NOT BEING ABLE TO STOP OR CONTROL WORRYING: SEVERAL DAYS

## 2021-06-08 ASSESSMENT — PATIENT HEALTH QUESTIONNAIRE - PHQ9
5. POOR APPETITE OR OVEREATING: NOT AT ALL
SUM OF ALL RESPONSES TO PHQ QUESTIONS 1-9: 0

## 2021-06-08 NOTE — NURSING NOTE
"Chief Complaint   Patient presents with     Pre-Op Exam     Health Maintenance     phq-9       Initial /84   Pulse 77   Temp 97.6  F (36.4  C) (Oral)   Resp 20   Wt 95.1 kg (209 lb 9.6 oz)   SpO2 95%   BMI 35.98 kg/m   Estimated body mass index is 35.98 kg/m  as calculated from the following:    Height as of 5/18/21: 1.626 m (5' 4\").    Weight as of this encounter: 95.1 kg (209 lb 9.6 oz).  Medication Reconciliation: complete  Tariq Mcdermott CMA    "

## 2021-06-09 ENCOUNTER — TRANSFERRED RECORDS (OUTPATIENT)
Dept: HEALTH INFORMATION MANAGEMENT | Facility: CLINIC | Age: 66
End: 2021-06-09

## 2021-06-09 ASSESSMENT — ANXIETY QUESTIONNAIRES: GAD7 TOTAL SCORE: 2

## 2021-06-16 NOTE — PATIENT INSTRUCTIONS - HE
Lala Yuen,    It was a pleasure to see you today at [facility].     My recommendations after this visit include:  - Please follow up with Dr Morrison in 6 weeks   - Please follow up with Laura Rangel in 3 months  - Continue current medications    Laura Rangel CNP      What is the [facility] Heart Failure [facility]?     The [facility] Failure [facility] is a heart failure specialty clinic within [facility].  You will work with your cardiologist, nurse practitioner, and nurses to carefully adjust medications and learn how to live with heart failure.  The Heart Failure Program will help you:      Better understand your chronic heart condition    Feel better and avoid hospital stays    Monitoring for Symptoms      Call the Heart Failure Phone Line ([phone]) if you have any of these symptoms:     Increased shortness of breath/shortness of breath at rest    Waking up at night with difficulty breathing    Unable to lie down for sleep due to symptoms or needing to sit upright for sleep    Weight gain of 2 pounds a day for 2 days in a row OR 5 pounds in 1 week    Increased swelling in your ankles or legs    Dizziness or lightheadedness    Medications       Take your medications as prescribed    Bring all your medications in their original bottles to every appointment    Avoid non-steroidal anti-inflammatory medications (Advil, Aleve, Ibuprofen, Naprosyn, Naproxen, Celebrex)    Do not stop taking your medications or begin taking over-the-counter or herbal medications without first talking to your doctor or nurse practitioner    Diet and Lifestyle       Limit sodium/salt to 2000 mg daily   o Read food labels for sodium content  o Do not add salt when cooking or add salt at the table    Weigh yourself every day and record in your daily weight log   o Call if you gain 2 pounds a day for 2 days in a row OR 5 pounds in 1 week  o Bring daily weight log to every  appointment    Stay active, pace yourself, listen to your body, and rest when tired    Elevate your legs if they are swollen. Ask about using compression/support stockings    Stop smoking    Lose weight if you are overweight    Avoid drinking alcohol or limit amount    Stay updated on your immunizations including flu and pneumonia vaccines

## 2021-06-16 NOTE — ANESTHESIA PROCEDURE NOTES
Peripheral Block    Patient location during procedure: pre-op  Start time: 3/29/2021 7:32 AM  End time: 3/29/2021 7:34 AM  surgical anesthesia  Staffing:  Performing  Anesthesiologist: Karen Arcos MD  Preanesthetic Checklist  Completed: patient identified, site marked, risks, benefits, and alternatives discussed, timeout performed, consent obtained, airway assessed, oxygen available, suction available, emergency drugs available and hand hygiene performed  Peripheral Block  Block type: brachial plexus, interscalene  Prep: ChloraPrep  Patient position: left lateral decubitus  Patient monitoring: cardiac monitor, continuous pulse oximetry and heart rate  Laterality: right  Injection technique: ultrasound guided    Ultrasound used to visualize needle placement in proximity to nerve being blocked: yes   US used to visualize anesthetic spread    Permanent ultrasound image captured for medical record  Sterile gel and probe cover used for ultrasound.  Needle  Needle type: Stimuplex   Needle gauge: 20G  Needle length: 4 in  no peripheral nerve catheter placed  Assessment  Injection assessment: no difficulty with injection, negative aspiration for heme, no paresthesia on injection and incremental injection

## 2021-06-16 NOTE — ANESTHESIA CARE TRANSFER NOTE
Last vitals:   Vitals:    03/29/21 0818   BP: 104/55   Pulse: 61   Resp: 14   Temp:    SpO2: 96%     Patient's level of consciousness is drowsy  Spontaneous respirations: yes  Maintains airway independently: yes  Dentition unchanged: yes  Oropharynx: oropharynx clear of all foreign objects    QCDR Measures:  ASA# 20 - Surgical Safety Checklist: WHO surgical safety checklist completed prior to induction    PQRS# 430 - Adult PONV Prevention: 4558F - Pt received => 2 anti-emetic agents (different classes) preop & intraop  ASA# 8 - Peds PONV Prevention: NA - Not pediatric patient, not GA or 2 or more risk factors NOT present  PQRS# 424 - Jennifer-op Temp Management: NA - MAC anesthesia or case < 60 minutes  PQRS# 426 - PACU Transfer Protocol: - Transfer of care checklist used  ASA# 14 - Acute Post-op Pain: ASA14B - Patient did NOT experience pain >= 7 out of 10

## 2021-06-16 NOTE — ANESTHESIA POSTPROCEDURE EVALUATION
Patient: Lala OTERO Hingos  Procedure(s):  CLOSED REDUCTION, SHOULDER (Right)  Anesthesia type: regional    Patient location: PACU  Last vitals:   Vitals Value Taken Time   /66 03/29/21 1137   Temp 36.7  C (98  F) 03/29/21 1137   Pulse 60 03/29/21 1242   Resp 18 03/29/21 1137   SpO2 95 % 03/29/21 1137   Vitals shown include unvalidated device data.  Post vital signs: stable  Level of consciousness: awake and responds to simple questions  Post-anesthesia pain: pain controlled  Post-anesthesia nausea and vomiting: no  Pulmonary: unassisted, return to baseline  Cardiovascular: stable and blood pressure at baseline  Hydration: adequate  Anesthetic events: no    QCDR Measures:  ASA# 11 - Jennifer-op Cardiac Arrest: ASA11B - Patient did NOT experience unanticipated cardiac arrest  ASA# 12 - Jennifer-op Mortality Rate: ASA12B - Patient did NOT die  ASA# 13 - PACU Re-Intubation Rate: ASA13B - Patient did NOT require a new airway mgmt  ASA# 10 - Composite Anes Safety: ASA10A - No serious adverse event    Additional Notes:

## 2021-06-16 NOTE — ANESTHESIA PREPROCEDURE EVALUATION
Anesthesia Evaluation      Patient summary reviewed   History of anesthetic complications     Airway   Mallampati: II  Neck ROM: full   Pulmonary - normal exam     ROS comment: Hypoxic respiratory failure due to acute CHF with flash pulmonary edema.  Improving.    03/28/21 0745 XR Chest 1 View Portable   Impression:     Significant partial clearing of interstitial and alveolar opacities consistent with improving lung edema. Symmetric lung inflation.    The minimal pleural fluid on the prior day CT are not visible by portable radiography. No pneumothorax.    The left PICC catheter crosses through the left brachiocephalic vein is directed superiorly retrograde within the medial right subclavian vein.    The cardiac silhouette is not enlarged and the vascular pedicle width is normal.    Persistent anterior dislocation of the right shoulder.                              Cardiovascular - normal exam  (+) hypertension, , hypercholesterolemia,     ECG reviewed     ROS comment: Pt had a mechanical fall with right shoulder dislocation and acute CHF with flash pulmonary edema caused by severe HTN.  She was admitted with acute hypoxic respiratory failure and is being treated with diuresis, NTG gtt and BiPAP.      3/28/21 Echo  Summary    1.Left ventricle ejection fraction is normal. The calculated left ventricular ejection fraction is 75%.    2.Normal right ventricular size and systolic function.    3.Mild left atrial chamber enlargement.    4.No hemodynamically significant valvular heart abnormalities.    5.No previous study for comparison.    03/27/21 1457 CTA Chest PE Run    Impression:     1. No acute pulmonary embolism or aortopathy.   2. Extensive mixed alveolar and interstitial lung opacities consistent with acute lung edema.  3. Minimal symmetric pleural effusions.  4. Anterior right shoulder dislocation. No associated fracture detected.          Neuro/Psych    (+) depression,     Endo/Other    (+) obesity (BMI 34),       GI/Hepatic/Renal            Dental - normal exam                        Anesthesia Plan  Planned anesthetic: peripheral nerve block and MAC    ASA 2     Anesthetic plan and risks discussed with: patient    Post-op plan: routine recovery

## 2021-06-17 NOTE — TELEPHONE ENCOUNTER
----- Message from DIANNA Richards sent at 4/27/2021 10:00 AM CDT -----  Regarding: TSB PATIENT  Patient has already contacted their pharmacy. The medication or refill issue is below:    Primary Cardiologist: OMAR    Medication: furosemide (LASIX) 40 MG tablet    Issue / Concern: NEEDS REFILL    Preferred Pharmacy/City: [id number] [address] [facility] Phone Number for Patient: [phone]    Additional Info:

## 2021-06-22 ENCOUNTER — TRANSFERRED RECORDS (OUTPATIENT)
Dept: HEALTH INFORMATION MANAGEMENT | Facility: CLINIC | Age: 66
End: 2021-06-22

## 2021-06-22 NOTE — PROGRESS NOTES
Clinic Care Coordination Contact  [facility]/Voicemail       Clinical Data: Care Coordinator Outreach  Outreach attempted x 1.  Left message on patient's voicemail with call back information and requested return call.  Plan: Care Coordinator will try to reach patient again in 1-2 business days.    EVON Gaines  Primary Care Clinic- Social Work Care Coordinator  [facility]  Ph: [phone]  12/21/2020 3:48 PM       Detail Level: Detailed Depth Of Biopsy: dermis Was A Bandage Applied: Yes Size Of Lesion In Cm: 0 Biopsy Type: H and E Biopsy Method: Acu-Razor Anesthesia Type: 1% lidocaine with epinephrine and a 1:10 solution of 8.4% sodium bicarbonate Anesthesia Volume In Cc: 0.3 Additional Anesthesia Volume In Cc (Will Not Render If 0): 6 Hemostasis: Drysol Wound Care: Vaniply Dressing: bandage Destruction After The Procedure: No Type Of Destruction Used: Curettage Curettage Text: The wound bed was treated with curettage after the biopsy was performed. Cryotherapy Text: The wound bed was treated with cryotherapy after the biopsy was performed. Electrodesiccation Text: The wound bed was treated with electrodesiccation after the biopsy was performed. Electrodesiccation And Curettage Text: The wound bed was treated with electrodesiccation and curettage after the biopsy was performed. Silver Nitrate Text: The wound bed was treated with silver nitrate after the biopsy was performed. Lab: [id number] Lab Facility: [id number] Consent: Written consent was obtained and risks were reviewed including but not limited to scarring, infection, bleeding, scabbing, incomplete removal, nerve damage and allergy to anesthesia. Post-Care Instructions: I reviewed with the patient in detail post-care instructions. Patient is to keep the biopsy site dry overnight, and then apply Vaniply twice daily until healed. Patient may apply hydrogen peroxide soaks to remove any crusting. Notification Instructions: No news is good news Billing Type: Third-Party Bill Information: Selecting Yes will display possible errors in your note based on the variables you have selected. This validation is only offered as a suggestion for you. PLEASE NOTE THAT THE VALIDATION TEXT WILL BE REMOVED WHEN YOU FINALIZE YOUR NOTE. IF YOU WANT TO FAX A PRELIMINARY NOTE YOU WILL NEED TO TOGGLE THIS TO 'NO' IF YOU DO NOT WANT IT IN YOUR FAXED NOTE.

## 2021-06-30 NOTE — PROGRESS NOTES
Progress Notes by Beatriz Morrison MD at 5/18/2021 12:50 PM     Author: Beatriz Morrison MD Service: -- Author Type: Physician    Filed: 5/18/2021  1:01 PM Encounter Date: 5/18/2021 Status: Signed    : Beatriz Morrison MD (Physician)         HEART CARE NOTE          Assessment/Recommendations     1. HFpEF c/b acute cardiogenic pulmonary edema   Assessment / Plan    Euvolemic on physical exam; denies orthopnea, PND, or fluid retention; continue PO diuretic regimen today     BP adequately controlled on current regimen - no changes to regimen today     2. HTN  Assessment / Plan    Stable and well controlled on current regimen      3. HLP  Assessment / Plan    Continue atorvastatin - no changes today    History of Present Illness/Subjective      Ms. Lala Yuen is a 65 y.o. female with a PMHx significant for longstanding hypertension, depression and dyslipidemia, hospitalized for severe dyspnea, acute heart failure and cardiogenic pulmonary edema in the setting of significantly elevated systolic blood pressure around 200 mmHg.      Today, Mrs. Yuen denies any acute complaints. Denies orthopnea, PND, fluid retention/edema     ECG: Personally reviewed. normal sinus rhythm.     ECHO (personnaly Reviewed):     1.Left ventricle ejection fraction is normal. The calculated left ventricular ejection fraction is 75%.    2.Normal right ventricular size and systolic function.    3.Mild left atrial chamber enlargement.    4.No hemodynamically significant valvular heart abnormalities.    5.No previous study for comparison.          Physical Examination Review of Systems   Vitals:    05/18/21 1240   BP: 92/60   Pulse: 68   Resp: 16     Body mass index is 35.19 kg/m .  Wt Readings from Last 3 Encounters:   05/18/21 205 lb (93 kg)   04/07/21 204 lb (92.5 kg)   04/01/21 202 lb 14.4 oz (92 kg)     General Appearance:   no distress, normal body habitus   ENT/Mouth: membranes moist, no oral  lesions or bleeding gums.      EYES:  no scleral icterus, normal conjunctivae   Neck: no carotid bruits or thyromegaly   Chest/Lungs:   lungs are clear to auscultation, no rales or wheezing, equal chest wall expansion    Cardiovascular:   Regular. Normal first and second heart sounds with no murmurs, rubs, or gallops; the carotid, radial and posterior tibial pulses are intact, no JVD or edema bilaterally    Abdomen:  no organomegaly, masses, bruits, or tenderness; bowel sounds are present   Extremities: no cyanosis or clubbing   Skin: no xanthelasma, warm.    Neurologic: normal gait, normal  bilateral, no tremors     Psychiatric: alert and oriented x3, calm     A complete 10 systems ROS was reviewed  And is negative except what is listed in the HPI.          Medical History  Surgical History Family History Social History   Past Medical History:   Diagnosis Date   ? Depression    ? Hyperlipidemia    ? Hypertension     Past Surgical History:   Procedure Laterality Date   ? CHOLECYSTECTOMY     ? SHOULDER SURGERY Right 3/29/2021    Procedure: CLOSED REDUCTION, SHOULDER;  Surgeon: Melo Mckeon MD;  Location: [facility];  Service: Orthopedics   ? uterine polypectomy       no family history of premature coronary artery disease Social History     Socioeconomic History   ? Marital status:      Spouse name: Not on file   ? Number of children: Not on file   ? Years of education: Not on file   ? Highest education level: Not on file   Occupational History   ? Not on file   Social Needs   ? Financial resource strain: Not on file   ? Food insecurity     Worry: Not on file     Inability: Not on file   ? Transportation needs     Medical: Not on file     Non-medical: Not on file   Tobacco Use   ? Smoking status: Never Smoker   ? Smokeless tobacco: Never Used   Substance and Sexual Activity   ? Alcohol use: Not Currently   ? Drug use: Not on file   ? Sexual activity: Not on file   Lifestyle   ? Physical activity      Days per week: Not on file     Minutes per session: Not on file   ? Stress: Not on file   Relationships   ? Social connections     Talks on phone: Not on file     Gets together: Not on file     Attends Zoroastrianism service: Not on file     Active member of club or organization: Not on file     Attends meetings of clubs or organizations: Not on file     Relationship status: Not on file   ? Intimate partner violence     Fear of current or ex partner: Not on file     Emotionally abused: Not on file     Physically abused: Not on file     Forced sexual activity: Not on file   Other Topics Concern   ? Not on file   Social History Narrative   ? Not on file           Lab Results    Chemistry/lipid CBC Cardiac Enzymes/BNP/TSH/INR   Lab Results   Component Value Date    CREATININE 0.79 04/01/2021    BUN 48 (H) 04/01/2021    K 4.1 04/01/2021     04/01/2021     04/01/2021    CO2 28 04/01/2021    Lab Results   Component Value Date    WBC 10.1 03/29/2021    HGB 13.4 03/29/2021    HCT 41.1 03/29/2021    MCV 89 03/29/2021     03/31/2021    Lab Results   Component Value Date    TROPONINI 0.04 03/27/2021     (H) 03/27/2021    INR 0.94 03/27/2021     Lab Results   Component Value Date    TROPONINI 0.04 03/27/2021          Weight:    Wt Readings from Last 3 Encounters:   04/07/21 204 lb (92.5 kg)   04/01/21 202 lb 14.4 oz (92 kg)       Allergies  Allergies   Allergen Reactions   ? Triamterene Hives         Surgical History  Past Surgical History:   Procedure Laterality Date   ? CHOLECYSTECTOMY     ? SHOULDER SURGERY Right 3/29/2021    Procedure: CLOSED REDUCTION, SHOULDER;  Surgeon: Melo Mckeon MD;  Location: [facility];  Service: Orthopedics   ? uterine polypectomy          Social History  Tobacco:   Social History     Tobacco Use   Smoking Status Never Smoker   Smokeless Tobacco Never Used    Alcohol:   Social History     Substance and Sexual Activity   Alcohol Use Not Currently    Illicit  Drugs:   Social History     Substance and Sexual Activity   Drug Use Not on file       Family History  Family History   Problem Relation Age of Onset   ? Breast cancer Mother           Sneha Morrison on 5/18/2021      cc: Colleen Marroquin MD,

## 2021-06-30 NOTE — PROGRESS NOTES
Progress Notes by Laura Rangel CNP at 4/7/2021  1:30 PM     Author: Laura Rangel CNP Service: -- Author Type: Nurse Practitioner    Filed: 4/7/2021  2:18 PM Encounter Date: 4/7/2021 Status: Signed    : Laura Rangel CNP (Nurse Practitioner)             Assessment/Recommendations   Assessment:    1.  Heart failure with preserved ejection fraction, NYHA class II: Compensated.  She has mild fatigue and dyspnea on exertion.  She denies orthopnea, PND or edema.  Her weight has been stable.  She is monitoring her salt intake.    2.  Hypertension: Controlled.  Blood pressure 136/70    Plan:  1.  Continue current medications  2.  Continue daily weights and low-sodium diet  3.  Encourage regular exercise    Lala Yuen will follow up with Dr. Morrison in 6 weeks and in the heart failure clinic in 3 months.     History of Present Illness/Subjective    Ms. Lala Yuen is a 65 y.o. female seen at [facility] heart failure clinic today for continued follow-up.  She follows up for heart failure with preserved ejection fraction.  She had an echocardiogram March 28, 2021 which showed ejection fraction of 75%.  She was recently hospitalized due to flash pulmonary edema and right shoulder dislocation.  She was given IV diuresis and symptoms improved.  She had a close reduction of her right shoulder and needs to follow-up with orthopedics.  She lost her  in October due to congestive heart failure.  She has past medical history significant for hypertension, hyperlipidemia, and depression.    Today, she states she has mild fatigue and dyspnea on exertion.  She denies orthopnea, PND, chest pain or edema.  She denies lightheadedness, shortness of breath, orthopnea, PND, palpitations, chest pain, abdominal fullness/bloating and lower extremity edema.      She is monitoring home weights which are stable on 200 pounds.  She is following a low sodium diet.     ECHOCARDIOGRAM:  3/28/2021  Summary      1.Left ventricle ejection fraction is normal. The calculated left ventricular ejection fraction is 75%.    2.Normal right ventricular size and systolic function.    3.Mild left atrial chamber enlargement.    4.No hemodynamically significant valvular heart abnormalities.    5.No previous study for comparison.          Physical Examination Review of Systems   There were no vitals filed for this visit.  There is no height or weight on file to calculate BMI.  Wt Readings from Last 3 Encounters:   04/01/21 202 lb 14.4 oz (92 kg)       General Appearance:   no acute distress   ENT/Mouth: membranes moist, no oral lesions or bleeding gums.      EYES:  no scleral icterus, normal conjunctivae   Neck: no carotid bruits or thyromegaly   Chest/Lungs:   lungs are clear to auscultation, no rales or wheezing, equal chest wall expansion    Cardiovascular:   Regular. Normal first and second heart sounds with no murmurs, rubs, or gallops; Jugular venous pressure normal, no edema bilaterally    Abdomen:  no organomegaly, masses, bruits, or tenderness; bowel sounds are present   Extremities: no cyanosis or clubbing   Skin: no xanthelasma, warm.    Neurologic: normal  bilateral, no tremors     Psychiatric: alert and oriented x3                                              Medical History  Surgical History Family History Social History   Past Medical History:   Diagnosis Date   ? Depression    ? Hyperlipidemia    ? Hypertension     Past Surgical History:   Procedure Laterality Date   ? CHOLECYSTECTOMY     ? SHOULDER SURGERY Right 3/29/2021    Procedure: CLOSED REDUCTION, SHOULDER;  Surgeon: Melo Mckeon MD;  [place] [facility] [place]: [place]opedics   ? uterine polypectomy       Family History   Problem Relation Age of Onset   ? Breast cancer Mother     Social History     Socioeconomic History   ? Marital status:      Spouse name: Not on file   ? Number of children: Not on file   ? Years of  education: Not on file   ? Highest education level: Not on file   Occupational History   ? Not on file   Social Needs   ? Financial resource strain: Not on file   ? Food insecurity     Worry: Not on file     Inability: Not on file   ? Transportation needs     Medical: Not on file     Non-medical: Not on file   Tobacco Use   ? Smoking status: Never Smoker   ? Smokeless tobacco: Never Used   Substance and Sexual Activity   ? Alcohol use: Not Currently   ? Drug use: Not on file   ? Sexual activity: Not on file   Lifestyle   ? Physical activity     Days per week: Not on file     Minutes per session: Not on file   ? Stress: Not on file   Relationships   ? Social connections     Talks on phone: Not on file     Gets together: Not on file     Attends Hindu service: Not on file     Active member of club or organization: Not on file     Attends meetings of clubs or organizations: Not on file     Relationship status: Not on file   ? Intimate partner violence     Fear of current or ex partner: Not on file     Emotionally abused: Not on file     Physically abused: Not on file     Forced sexual activity: Not on file   Other Topics Concern   ? Not on file   Social History Narrative   ? Not on file          Medications  Allergies   Current Outpatient Medications   Medication Sig Dispense Refill   ? acetaminophen (TYLENOL) 500 MG tablet Take 1-2 tablets (500-1,000 mg total) by mouth every 4 (four) hours as needed.  0   ? ARIPiprazole (ABILIFY) 5 MG tablet Take 5 mg by mouth daily.     ? aspirin 81 MG EC tablet Take 81 mg by mouth daily.     ? atorvastatin (LIPITOR) 10 MG tablet Take 10 mg by mouth daily with supper.     ? famotidine (PEPCID) 20 MG tablet Take 1 tablet (20 mg total) by mouth 2 (two) times a day as needed for heartburn.  0   ? furosemide (LASIX) 40 MG tablet Take 1 tablet (40 mg total) by mouth daily. 30 tablet 0   ? gabapentin (NEURONTIN) 300 MG capsule Take 300 mg by mouth 3 (three) times a day.     ? losartan  (COZAAR) 100 MG tablet Take 100 mg by mouth daily.     ? sertraline (ZOLOFT) 50 MG tablet Take 125 mg by mouth daily.     ? timoloL maleate (TIMOPTIC) 0.5 % ophthalmic solution Administer 1 drop to both eyes 2 (two) times a day.     ? traZODone (DESYREL) 50 MG tablet Take 50 mg by mouth at bedtime.     ? vitamin E 400 unit capsule Take 400 Units by mouth daily.       No current facility-administered medications for this visit.     Allergies   Allergen Reactions   ? Triamterene Hives         Lab Results    Chemistry/lipid CBC Cardiac Enzymes/BNP/TSH/INR   Lab Results   Component Value Date    CREATININE 0.79 04/01/2021    BUN 48 (H) 04/01/2021    K 4.1 04/01/2021     04/01/2021     04/01/2021    CO2 28 04/01/2021    Lab Results   Component Value Date    WBC 10.1 03/29/2021    HGB 13.4 03/29/2021    HCT 41.1 03/29/2021    MCV 89 03/29/2021     03/31/2021    Lab Results   Component Value Date    TROPONINI 0.04 03/27/2021     (H) 03/27/2021    INR 0.94 03/27/2021             This note has been dictated using voice recognition software. Any grammatical, typographical, or context distortions are unintentional and inherent to the software    30 minutes spent on the date of encounter doing chart review, review of outside records, review of test results, interpretation with above tests, patient visit and documentation.

## 2021-07-04 NOTE — ADDENDUM NOTE
Addendum Note by Karen Arcos MD at 4/8/2021  1:23 PM     Author: Karen Arcos MD Service: -- Author Type: Physician    Filed: 4/8/2021  1:23 PM Date of Service: 4/8/2021  1:23 PM Status: Signed    : Karen Arcos MD (Physician)       Addendum  created 04/08/21 1323 by Karen Arcos MD    Clinical Note Signed, Intraprocedure Blocks edited

## 2021-07-12 ENCOUNTER — OFFICE VISIT (OUTPATIENT)
Dept: CARDIOLOGY | Facility: CLINIC | Age: 66
End: 2021-07-12
Payer: COMMERCIAL

## 2021-07-12 VITALS
BODY MASS INDEX: 36.88 KG/M2 | HEIGHT: 64 IN | SYSTOLIC BLOOD PRESSURE: 118 MMHG | RESPIRATION RATE: 16 BRPM | HEART RATE: 64 BPM | DIASTOLIC BLOOD PRESSURE: 78 MMHG | WEIGHT: 216 LBS

## 2021-07-12 DIAGNOSIS — I50.32 CHRONIC DIASTOLIC HEART FAILURE (H): Primary | ICD-10-CM

## 2021-07-12 PROCEDURE — 99214 OFFICE O/P EST MOD 30 MIN: CPT | Performed by: INTERNAL MEDICINE

## 2021-07-12 RX ORDER — OXYCODONE HYDROCHLORIDE 5 MG/1
1 TABLET ORAL PRN
COMMUNITY
Start: 2021-06-09 | End: 2021-10-05

## 2021-07-12 ASSESSMENT — MIFFLIN-ST. JEOR: SCORE: 1499.77

## 2021-07-12 NOTE — PROGRESS NOTES
"  HEART CARE NOTE          Assessment/Recommendations     1. HFpEF c/b acute cardiogenic pulmonary edema   Assessment / Plan  Euvolemic on physical exam; denies orthopnea, PND, or fluid retention; continue PO diuretic regimen today   BP adequately controlled on current regimen - no changes to regimen today     2. HTN  Assessment / Plan  Stable and well controlled on current regimen      3. HLP  Assessment / Plan  Continue atorvastatin - no changes today    History of Present Illness/Subjective      Ms. Lala Yuen is a 65 y.o. female with a PMHx significant for longstanding hypertension, depression and dyslipidemia, hospitalized for severe dyspnea, acute heart failure and cardiogenic pulmonary edema in the setting of significantly elevated systolic blood pressure around 200 mmHg.      Today, Mrs. Yuen denies any acute complaints. Denies orthopnea, PND, fluid retention/edema     ECG: Personally reviewed. normal sinus rhythm.     ECHO (personnaly Reviewed):   1.Left ventricle ejection fraction is normal. The calculated left ventricular ejection fraction is 75%.  2.Normal right ventricular size and systolic function.  3.Mild left atrial chamber enlargement.  4.No hemodynamically significant valvular heart abnormalities.  5.No previous study for comparison.          Physical Examination Review of Systems   /78 (BP Location: Left arm, Patient Position: Sitting, Cuff Size: Adult Regular)   Pulse 64   Resp 16   Ht 1.626 m (5' 4\")   Wt 98 kg (216 lb)   BMI 37.08 kg/m    Body mass index is 37.08 kg/m .  Wt Readings from Last 3 Encounters:   07/12/21 98 kg (216 lb)   06/08/21 95.1 kg (209 lb 9.6 oz)   05/18/21 93 kg (205 lb)     General Appearance:   no distress, normal body habitus   ENT/Mouth: membranes moist, no oral lesions or bleeding gums.      EYES:  no scleral icterus, normal conjunctivae   Neck: no carotid bruits or thyromegaly   Chest/Lungs:   lungs are clear to auscultation, no rales or wheezing, " equal chest wall expansion    Cardiovascular:   Regular. Normal first and second heart sounds with no murmurs, rubs, or gallops; the carotid, radial and posterior tibial pulses are intact, no JVD or edema bilaterally    Abdomen:  no organomegaly, masses, bruits, or tenderness; bowel sounds are present   Extremities: no cyanosis or clubbing   Skin: no xanthelasma, warm.    Neurologic: normal gait, normal  bilateral, no tremors     Psychiatric: alert and oriented x3, calm     A complete 10 systems ROS was reviewed  And is negative except what is listed in the HPI.          Medical History  Surgical History Family History Social History   Past Medical History:   Diagnosis Date     Abnormal Pap smear 1985    cryo, nl paps since     Depression      Glaucoma 01/30/2020    left eye      HTN (hypertension)      Hyperlipidemia      Hyperlipidemia LDL goal < 130      Hypertension     Past Surgical History:   Procedure Laterality Date     CHOLECYSTECTOMY       CHOLECYSTECTOMY, LAPOROSCOPIC            OPERATIVE HYSTEROSCOPY WITH MORCELLATOR N/A 3/23/2021    Procedure: Diagnostic Hysteroscopy with biopsy;  Surgeon: Deny Tapia MD;  Location:  OR     OTHER SURGICAL HISTORY      uterine polypectomy      SHOULDER SURGERY Right 3/29/2021    Procedure: CLOSED REDUCTION, SHOULDER;  Surgeon: Melo Mckeon MD;  Location: [facility];  Service: Orthopedics    no family history of premature coronary artery disease Social History     Socioeconomic History     Marital status:      Spouse name: Agusto     Number of children: 2     Years of education: Not on file     Highest education level: Not on file   Occupational History     Occupation:  provider     Employer: [employer]   Tobacco Use     Smoking status: Never Smoker     Smokeless tobacco: Never Used     Tobacco comment: no smokers in the [facility]   Substance and Sexual Activity     Alcohol use: Yes     Comment: occasional      Drug use: No     Sexual activity: Yes     Partners: Male     Comment: post menopausal   Other Topics Concern      Service Not Asked     Blood Transfusions Not Asked     Caffeine Concern Yes     Comment: 2 cans of pop a week     Occupational Exposure Not Asked     Hobby Hazards Not Asked     Sleep Concern Not Asked     Stress Concern Not Asked     Weight Concern Not Asked     Special Diet Not Asked     Back Care Not Asked     Exercise Yes     Comment: walking 45 minutes every other day     Bike Helmet Not Asked     Seat Belt Yes     Self-Exams Yes     Parent/sibling w/ CABG, MI or angioplasty before 65F 55M? Not Asked   Social History Narrative     Not on file     Social Determinants of Health     Financial Resource Strain: Low Risk      Difficulty of Paying Living Expenses: Not hard at all   Food Insecurity: No Food Insecurity     Worried About Running Out of Food in the Last Year: Never true     Ran Out of Food in the Last Year: Never true   Transportation Needs: No Transportation Needs     Lack of Transportation (Medical): No     Lack of Transportation (Non-Medical): No   Physical Activity:      Days of Exercise per Week:      Minutes of Exercise per Session:    Stress:      Feeling of Stress :    Social Connections:      Frequency of Communication with Friends and Family:      Frequency of Social Gatherings with Friends and Family:      Attends Scientology Services:      Active Member of Clubs or Organizations:      Attends Club or Organization Meetings:      Marital Status:    Intimate Partner Violence:      Fear of Current or Ex-Partner:      Emotionally Abused:      Physically Abused:      Sexually Abused:            Lab Results    Chemistry/lipid CBC Cardiac Enzymes/BNP/TSH/INR   Lab Results   Component Value Date    CHOL 185 03/15/2021    HDL 69 03/15/2021    TRIG 87 03/15/2021    BUN 21 04/06/2021     04/06/2021    CO2 28 04/06/2021    Lab Results   Component Value Date    WBC 10.1  03/29/2021    HGB 13.4 03/29/2021    HCT 41.1 03/29/2021    MCV 89 03/29/2021     03/31/2021    Lab Results   Component Value Date    TROPONINI 0.04 03/27/2021     (H) 03/27/2021    INR 0.94 03/27/2021     Lab Results   Component Value Date    TROPONINI 0.04 03/27/2021          Weight:    Wt Readings from Last 3 Encounters:   07/12/21 98 kg (216 lb)   06/08/21 95.1 kg (209 lb 9.6 oz)   05/18/21 93 kg (205 lb)       Allergies  Allergies   Allergen Reactions     Triamterene Hives         Surgical History  Past Surgical History:   Procedure Laterality Date     CHOLECYSTECTOMY       CHOLECYSTECTOMY, LAPOROSCOPIC            OPERATIVE HYSTEROSCOPY WITH MORCELLATOR N/A 3/23/2021    Procedure: Diagnostic Hysteroscopy with biopsy;  Surgeon: Deny Tapia MD;  Location:  OR     OTHER SURGICAL HISTORY      uterine polypectomy      SHOULDER SURGERY Right 3/29/2021    Procedure: CLOSED REDUCTION, SHOULDER;  Surgeon: Melo Mckeon MD;  [place] [facility] [place]       Social History  Tobacco:   History   Smoking Status     Never Smoker   Smokeless Tobacco     Never Used     [facility]    Alcohol:   Social History    Substance and Sexual Activity      Alcohol use: Yes        Comment: occasional   Illicit Drugs:   History   Drug Use No       Family History  Family History   Problem Relation Age of Onset     Breast Cancer Mother      Hypertension Mother      Lipids Mother      Cancer Father         kidney cancer     Diabetes Paternal Grandfather         adult     Breast Cancer Sister      Cancer Sister      Heart Disease Maternal Aunt      Cancer Sister         pancreatic          Sneha Morrison MD on 7/12/2021      cc: Colleen Marroquin

## 2021-07-12 NOTE — LETTER
"7/12/2021    Colleen Marroquin MD  [id number] [id number]    RE: Lala Yuen       Dear Colleague,    I had the pleasure of seeing Lala Yuen in the [facility].      HEART CARE NOTE          Assessment/Recommendations     1. HFpEF c/b acute cardiogenic pulmonary edema   Assessment / Plan    Euvolemic on physical exam; denies orthopnea, PND, or fluid retention; continue PO diuretic regimen today     BP adequately controlled on current regimen - no changes to regimen today     2. HTN  Assessment / Plan    Stable and well controlled on current regimen      3. HLP  Assessment / Plan    Continue atorvastatin - no changes today    History of Present Illness/Subjective      Ms. Lala Yuen is a 65 y.o. female with a PMHx significant for longstanding hypertension, depression and dyslipidemia, hospitalized for severe dyspnea, acute heart failure and cardiogenic pulmonary edema in the setting of significantly elevated systolic blood pressure around 200 mmHg.      Today, Mrs. Yuen denies any acute complaints. Denies orthopnea, PND, fluid retention/edema     ECG: Personally reviewed. normal sinus rhythm.     ECHO (personnaly Reviewed):     1.Left ventricle ejection fraction is normal. The calculated left ventricular ejection fraction is 75%.    2.Normal right ventricular size and systolic function.    3.Mild left atrial chamber enlargement.    4.No hemodynamically significant valvular heart abnormalities.    5.No previous study for comparison.          Physical Examination Review of Systems   /78 (BP Location: Left arm, Patient Position: Sitting, Cuff Size: Adult Regular)   Pulse 64   Resp 16   Ht 1.626 m (5' 4\")   Wt 98 kg (216 lb)   BMI 37.08 kg/m    Body mass index is 37.08 kg/m .  Wt Readings from Last 3 Encounters:   07/12/21 98 kg (216 lb)   06/08/21 95.1 kg (209 lb 9.6 oz)   05/18/21 93 kg (205 lb)     General " Appearance:   no distress, normal body habitus   ENT/Mouth: membranes moist, no oral lesions or bleeding gums.      EYES:  no scleral icterus, normal conjunctivae   Neck: no carotid bruits or thyromegaly   Chest/Lungs:   lungs are clear to auscultation, no rales or wheezing, equal chest wall expansion    Cardiovascular:   Regular. Normal first and second heart sounds with no murmurs, rubs, or gallops; the carotid, radial and posterior tibial pulses are intact, no JVD or edema bilaterally    Abdomen:  no organomegaly, masses, bruits, or tenderness; bowel sounds are present   Extremities: no cyanosis or clubbing   Skin: no xanthelasma, warm.    Neurologic: normal gait, normal  bilateral, no tremors     Psychiatric: alert and oriented x3, calm     A complete 10 systems ROS was reviewed  And is negative except what is listed in the HPI.          Medical History  Surgical History Family History Social History   Past Medical History:   Diagnosis Date     Abnormal Pap smear 1985    cryo, nl paps since     Depression      Glaucoma 01/30/2020    left eye      HTN (hypertension)      Hyperlipidemia      Hyperlipidemia LDL goal < 130      Hypertension     Past Surgical History:   Procedure Laterality Date     CHOLECYSTECTOMY       CHOLECYSTECTOMY, LAPOROSCOPIC            OPERATIVE HYSTEROSCOPY WITH MORCELLATOR N/A 3/23/2021    Procedure: Diagnostic Hysteroscopy with biopsy;  Surgeon: Deny Tapia MD;  Location:  OR     OTHER SURGICAL HISTORY      uterine polypectomy      SHOULDER SURGERY Right 3/29/2021    Procedure: CLOSED REDUCTION, SHOULDER;  Surgeon: Melo Mckeon MD;  Location: [facility];  Service: Orthopedics    no family history of premature coronary artery disease Social History     Socioeconomic History     Marital status:      Spouse name: Agusto     Number of children: 2     Years of education: Not on file     Highest education level: Not on file   Occupational History     Occupation:   provider     Employer: [employer]   Tobacco Use     Smoking status: Never Smoker     Smokeless tobacco: Never Used     Tobacco comment: no smokers in the [facility]   Substance and Sexual Activity     Alcohol use: Yes     Comment: occasional     Drug use: No     Sexual activity: Yes     Partners: Male     Comment: post menopausal   Other Topics Concern      Service Not Asked     Blood Transfusions Not Asked     Caffeine Concern Yes     Comment: 2 cans of pop a week     Occupational Exposure Not Asked     Hobby Hazards Not Asked     Sleep Concern Not Asked     Stress Concern Not Asked     Weight Concern Not Asked     Special Diet Not Asked     Back Care Not Asked     Exercise Yes     Comment: walking 45 minutes every other day     Bike Helmet Not Asked     Seat Belt Yes     Self-Exams Yes     Parent/sibling w/ CABG, MI or angioplasty before 65F 55M? Not Asked   Social History Narrative     Not on file     Social Determinants of Health     Financial Resource Strain: Low Risk      Difficulty of Paying Living Expenses: Not hard at all   Food Insecurity: No Food Insecurity     Worried About Running Out of Food in the Last Year: Never true     Ran Out of Food in the Last Year: Never true   Transportation Needs: No Transportation Needs     Lack of Transportation (Medical): No     Lack of Transportation (Non-Medical): No   Physical Activity:      Days of Exercise per Week:      Minutes of Exercise per Session:    Stress:      Feeling of Stress :    Social Connections:      Frequency of Communication with Friends and Family:      Frequency of Social Gatherings with Friends and Family:      Attends Adventist Services:      Active Member of Clubs or Organizations:      Attends Club or Organization Meetings:      Marital Status:    Intimate Partner Violence:      Fear of Current or Ex-Partner:      Emotionally Abused:      Physically Abused:      Sexually Abused:            Lab Results     Chemistry/lipid CBC Cardiac Enzymes/BNP/TSH/INR   Lab Results   Component Value Date    CHOL 185 03/15/2021    HDL 69 03/15/2021    TRIG 87 03/15/2021    BUN 21 04/06/2021     04/06/2021    CO2 28 04/06/2021    Lab Results   Component Value Date    WBC 10.1 03/29/2021    HGB 13.4 03/29/2021    HCT 41.1 03/29/2021    MCV 89 03/29/2021     03/31/2021    Lab Results   Component Value Date    TROPONINI 0.04 03/27/2021     (H) 03/27/2021    INR 0.94 03/27/2021     Lab Results   Component Value Date    TROPONINI 0.04 03/27/2021          Weight:    Wt Readings from Last 3 Encounters:   07/12/21 98 kg (216 lb)   06/08/21 95.1 kg (209 lb 9.6 oz)   05/18/21 93 kg (205 lb)       Allergies  Allergies   Allergen Reactions     Triamterene Hives         Surgical History  Past Surgical History:   Procedure Laterality Date     CHOLECYSTECTOMY       CHOLECYSTECTOMY, LAPOROSCOPIC            OPERATIVE HYSTEROSCOPY WITH MORCELLATOR N/A 3/23/2021    Procedure: Diagnostic Hysteroscopy with biopsy;  Surgeon: Deny Tapia MD;  Location:  OR     OTHER SURGICAL HISTORY      uterine polypectomy      SHOULDER SURGERY Right 3/29/2021    Procedure: CLOSED REDUCTION, SHOULDER;  Surgeon: Melo Mckeon MD;  Location: [facility] [place];  Service: Orthopedics       Social History  Tobacco:   History   Smoking Status     Never Smoker   Smokeless Tobacco     Never Used     Comment: no smokers in the [facility]    Alcohol:   Social History    Substance and Sexual Activity      Alcohol use: Yes        Comment: occasional   Illicit Drugs:   History   Drug Use No       Family History  Family History   Problem Relation Age of Onset     Breast Cancer Mother      Hypertension Mother      Lipids Mother      Cancer Father         kidney cancer     Diabetes Paternal Grandfather         adult     Breast Cancer Sister      Cancer Sister      Heart Disease Maternal Aunt      Cancer Sister         pancreatic          Sneha  MD Prince on 7/12/2021      cc: Colleen Marroquin        Thank you for allowing me to participate in the care of your patient.      Sincerely,     Sneha Morrison MD     [facility]  cc:   No referring provider defined for this encounter.

## 2021-07-19 ENCOUNTER — TRANSFERRED RECORDS (OUTPATIENT)
Dept: HEALTH INFORMATION MANAGEMENT | Facility: CLINIC | Age: 66
End: 2021-07-19

## 2021-07-29 DIAGNOSIS — F41.9 ANXIETY: ICD-10-CM

## 2021-07-29 DIAGNOSIS — F32.3 MAJOR DEPRESSIVE DISORDER, SINGLE EPISODE, SEVERE WITH PSYCHOTIC FEATURES (H): ICD-10-CM

## 2021-07-29 RX ORDER — ARIPIPRAZOLE 5 MG/1
5 TABLET ORAL EVERY MORNING
Qty: 90 TABLET | Refills: 0 | Status: SHIPPED | OUTPATIENT
Start: 2021-07-29 | End: 2021-11-15

## 2021-07-29 NOTE — TELEPHONE ENCOUNTER
"Requested Prescriptions   Pending Prescriptions Disp Refills     ARIPiprazole (ABILIFY) 5 MG tablet [Pharmacy Med Name: ARIPiprazole Oral Tablet 5 MG] 90 tablet 0     Sig: Take 1 tablet (5 mg) by mouth every morning       Antipsychotic Medications Failed - 7/29/2021  9:04 AM        Failed - CBC on file in past 12 months     Recent Labs   Lab Test 03/31/21  0616 03/29/21  0556   WBC  --  10.1   RBC  --  4.63   HGB  --  13.4   HCT  --  41.1    260                 Passed - Blood pressure under 140/90 in past 12 months     BP Readings from Last 3 Encounters:   07/12/21 118/78   06/08/21 134/84   05/18/21 92/60                 Passed - Patient is 12 years of age or older        Passed - Lipid panel on file within the past 12 months     Recent Labs   Lab Test 03/15/21  0759 09/16/15  1119   CHOL 185 157   TRIG 87 79   HDL 69 62   LDL 99 79   NHDL 116  --    VLDL  --  16   CHOLHDLRATIO  --  2.5               Passed - Heart Rate on file within past 12 months     Pulse Readings from Last 3 Encounters:   07/12/21 64   06/08/21 77   05/18/21 68               Passed - A1c or Glucose on file in past 12 months     Recent Labs   Lab Test 04/06/21  1054   GLC 89       Please review patients last 3 weights. If a weight gain of >10 lbs exists, you may refill the prescription once after instructing the patient to schedule an appointment within the next 30 days.    Wt Readings from Last 3 Encounters:   07/12/21 98 kg (216 lb)   06/08/21 95.1 kg (209 lb 9.6 oz)   05/18/21 93 kg (205 lb)             Passed - Medication is active on med list        Passed - Patient is not pregnant        Passed - No positve pregnancy test on file in past 12 months        Passed - Recent (6 mo) or future (30 days) visit within the authorizing provider's specialty     Patient had office visit in the last 6 months or has a visit in the next 30 days with authorizing provider or within the authorizing provider's specialty.  See \"Patient Info\" tab in " "inbasket, or \"Choose Columns\" in Meds & Orders section of the refill encounter.                   Alexa AYALAN, RN    "

## 2021-09-07 ENCOUNTER — TRANSFERRED RECORDS (OUTPATIENT)
Dept: HEALTH INFORMATION MANAGEMENT | Facility: CLINIC | Age: 66
End: 2021-09-07

## 2021-09-09 DIAGNOSIS — J96.01 ACUTE RESPIRATORY FAILURE WITH HYPOXIA (H): Primary | ICD-10-CM

## 2021-09-09 RX ORDER — FUROSEMIDE 40 MG
40 TABLET ORAL DAILY
Qty: 90 TABLET | Refills: 1 | Status: SHIPPED | OUTPATIENT
Start: 2021-09-09 | End: 2022-03-01

## 2021-09-19 DIAGNOSIS — F41.9 ANXIETY: ICD-10-CM

## 2021-09-19 DIAGNOSIS — F32.3 MAJOR DEPRESSIVE DISORDER, SINGLE EPISODE, SEVERE WITH PSYCHOTIC FEATURES (H): ICD-10-CM

## 2021-10-05 ENCOUNTER — OFFICE VISIT (OUTPATIENT)
Dept: CARDIOLOGY | Facility: CLINIC | Age: 66
End: 2021-10-05
Attending: INTERNAL MEDICINE
Payer: COMMERCIAL

## 2021-10-05 VITALS
HEART RATE: 60 BPM | RESPIRATION RATE: 16 BRPM | WEIGHT: 210 LBS | DIASTOLIC BLOOD PRESSURE: 80 MMHG | HEIGHT: 64 IN | SYSTOLIC BLOOD PRESSURE: 136 MMHG | BODY MASS INDEX: 35.85 KG/M2

## 2021-10-05 DIAGNOSIS — I50.32 CHRONIC DIASTOLIC HEART FAILURE (H): Primary | ICD-10-CM

## 2021-10-05 PROCEDURE — 99213 OFFICE O/P EST LOW 20 MIN: CPT | Performed by: INTERNAL MEDICINE

## 2021-10-05 RX ORDER — TIMOLOL MALEATE 5 MG/ML
SOLUTION/ DROPS OPHTHALMIC
COMMUNITY
Start: 2021-08-02 | End: 2024-03-14

## 2021-10-05 ASSESSMENT — MIFFLIN-ST. JEOR: SCORE: 1472.55

## 2021-10-05 NOTE — PROGRESS NOTES
"  HEART CARE NOTE          Assessment/Recommendations     1. HFpEF c/b acute cardiogenic pulmonary edema   Assessment / Plan  Euvolemic on physical exam; denies orthopnea, PND, or fluid retention; continue PO diuretic regimen today   BP adequately controlled on current regimen - no changes to regimen today     2. HTN  Assessment / Plan  Stable and well controlled on current regimen      3. HLP  Assessment / Plan  Continue atorvastatin - no changes today      History of Present Illness/Subjective      Ms. Lala Yuen is a 65 y.o. female with a PMHx significant for longstanding hypertension, depression and dyslipidemia, which most recent hospitalization for severe dyspnea, acute heart failure and cardiogenic pulmonary edema in the setting of significantly elevated systolic blood pressure around 200 mmHg. Today, [facility]. [facility]en [facility]up care.       Mrs. Yuen denies any acute complaints. Denies orthopnea, PND, fluid retention/edema     ECG: Personally reviewed. normal sinus rhythm.     ECHO (personnaly Reviewed):   1.Left ventricle ejection fraction is normal. The calculated left ventricular ejection fraction is 75%.  2.Normal right ventricular size and systolic function.  3.Mild left atrial chamber enlargement.  4.No hemodynamically significant valvular heart abnormalities.  5.No previous study for comparison.          Physical Examination Review of Systems   /80 (BP Location: Right arm, Patient Position: Sitting, Cuff Size: Adult Large)   Pulse 60   Resp 16   Ht 1.626 m (5' 4\")   Wt 95.3 kg (210 lb)   BMI 36.05 kg/m    Body mass index is 36.05 kg/m .  Wt Readings from Last 3 Encounters:   10/05/21 95.3 kg (210 lb)   07/12/21 98 kg (216 lb)   06/08/21 95.1 kg (209 lb 9.6 oz)     General Appearance:   no distress, normal body habitus   ENT/Mouth: membranes moist, no oral lesions or bleeding gums.      EYES:  no scleral icterus, normal conjunctivae   Neck: no carotid bruits or " thyromegaly   Chest/Lungs:   lungs are clear to auscultation, no rales or wheezing, equal chest wall expansion    Cardiovascular:   Regular. Normal first and second heart sounds with no murmurs, rubs, or gallops; the carotid, radial and posterior tibial pulses are intact, no JVD or LE edema bilaterally    Abdomen:  no organomegaly, masses, bruits, or tenderness; bowel sounds are present   Extremities: no cyanosis or clubbing   Skin: no xanthelasma, warm.    Neurologic: normal gait, normal  bilateral, no tremors     Psychiatric: alert and oriented x3, calm     A complete 10 systems ROS was reviewed  And is negative except what is listed in the HPI.          Medical History  Surgical History Family History Social History   Past Medical History:   Diagnosis Date     Abnormal Pap smear 1985    cryo, nl paps since     Depression      Glaucoma 01/30/2020    left eye      HTN (hypertension)      Hyperlipidemia      Hyperlipidemia LDL goal < 130      Hypertension     Past Surgical History:   Procedure Laterality Date     CHOLECYSTECTOMY       CHOLECYSTECTOMY, LAPOROSCOPIC            OPERATIVE HYSTEROSCOPY WITH MORCELLATOR N/A 3/23/2021    Procedure: Diagnostic Hysteroscopy with biopsy;  Surgeon: Deny Tapia MD;  Location:  OR     OTHER SURGICAL HISTORY      uterine polypectomy      SHOULDER SURGERY Right 3/29/2021    Procedure: CLOSED REDUCTION, SHOULDER;  Surgeon: Melo Mckeon MD;  [place] [facility] [place]opedics    no family history of premature coronary artery disease Social History     Socioeconomic History     Marital status:      Spouse name: Agusto     Number of children: 2     Years of education: Not on file     Highest education level: Not on file   Occupational History     Occupation:  provider     Employer: [employer]   Tobacco Use     Smoking status: Never Smoker     Smokeless tobacco: Never Used     Tobacco comment: no smokers in the  [facility]   Substance and Sexual Activity     Alcohol use: Yes     Comment: occasional     Drug use: No     Sexual activity: Yes     Partners: Male     Comment: post menopausal   Other Topics Concern      Service Not Asked     Blood Transfusions Not Asked     Caffeine Concern Yes     Comment: 2 cans of pop a week     Occupational Exposure Not Asked     Hobby Hazards Not Asked     Sleep Concern Not Asked     Stress Concern Not Asked     Weight Concern Not Asked     Special Diet Not Asked     Back Care Not Asked     Exercise Yes     Comment: walking 45 minutes every other day     Bike Helmet Not Asked     Seat Belt Yes     Self-Exams Yes     Parent/sibling w/ CABG, MI or angioplasty before 65F 55M? Not Asked   Social History Narrative     Not on file     Social Determinants of Health     Financial Resource Strain: Low Risk      Difficulty of Paying Living Expenses: Not hard at all   Food Insecurity: No Food Insecurity     Worried About Running Out of Food in the Last Year: Never true     Ran Out of Food in the Last Year: Never true   Transportation Needs: No Transportation Needs     Lack of Transportation (Medical): No     Lack of Transportation (Non-Medical): No   Physical Activity:      Days of Exercise per Week:      Minutes of Exercise per Session:    Stress:      Feeling of Stress :    Social Connections:      Frequency of Communication with Friends and Family:      Frequency of Social Gatherings with Friends and Family:      Attends Sikh Services:      Active Member of Clubs or Organizations:      Attends Club or Organization Meetings:      Marital Status:    Intimate Partner Violence:      Fear of Current or Ex-Partner:      Emotionally Abused:      Physically Abused:      Sexually Abused:            Lab Results    Chemistry/lipid CBC Cardiac Enzymes/BNP/TSH/INR   Lab Results   Component Value Date    CHOL 185 03/15/2021    HDL 69 03/15/2021    TRIG 87 03/15/2021    BUN 21 04/06/2021      04/06/2021    CO2 28 04/06/2021    Lab Results   Component Value Date    WBC 10.1 03/29/2021    HGB 13.4 03/29/2021    HCT 41.1 03/29/2021    MCV 89 03/29/2021     03/31/2021    Lab Results   Component Value Date    TROPONINI 0.04 03/27/2021     (H) 03/27/2021    INR 0.94 03/27/2021     Lab Results   Component Value Date    TROPONINI 0.04 03/27/2021          Weight:    Wt Readings from Last 3 Encounters:   07/12/21 98 kg (216 lb)   06/08/21 95.1 kg (209 lb 9.6 oz)   05/18/21 93 kg (205 lb)       Allergies  Allergies   Allergen Reactions     Triamterene Hives         Surgical History  Past Surgical History:   Procedure Laterality Date     CHOLECYSTECTOMY       CHOLECYSTECTOMY, LAPOROSCOPIC            OPERATIVE HYSTEROSCOPY WITH MORCELLATOR N/A 3/23/2021    Procedure: Diagnostic Hysteroscopy with biopsy;  Surgeon: Deny Tapia MD;  Location:  OR     OTHER SURGICAL HISTORY      uterine polypectomy      SHOULDER SURGERY Right 3/29/2021    Procedure: CLOSED REDUCTION, SHOULDER;  Surgeon: Melo Mckeon MD;  Location: [facility];  Service: Orthopedics       Social History  Tobacco:   History   Smoking Status     Never Smoker   Smokeless Tobacco     Never Used     Comment: no smokers in the [facility]    Alcohol:   Social History    Substance and Sexual Activity      Alcohol use: Yes        Comment: occasional   Illicit Drugs:   History   Drug Use No       Family History  Family History   Problem Relation Age of Onset     Breast Cancer Mother      Hypertension Mother      Lipids Mother      Cancer Father         kidney cancer     Diabetes Paternal Grandfather         adult     Breast Cancer Sister      Cancer Sister      Heart Disease Maternal Aunt      Cancer Sister         pancreatic          Sneha Morrison MD on 10/5/2021      cc: Colleen Marroquin

## 2021-10-05 NOTE — LETTER
"10/5/2021    Colleen Marroquin MD  [address] [facility] [address]    RE: Lala Yuen       Dear Colleague,    I had the pleasure of seeing Lala Yuen in the [facility].      HEART CARE NOTE          Assessment/Recommendations     1. HFpEF c/b acute cardiogenic pulmonary edema   Assessment / Plan    Euvolemic on physical exam; denies orthopnea, PND, or fluid retention; continue PO diuretic regimen today     BP adequately controlled on current regimen - no changes to regimen today     2. HTN  Assessment / Plan    Stable and well controlled on current regimen      3. HLP  Assessment / Plan    Continue atorvastatin - no changes today      History of Present Illness/Subjective      Ms. Lala Yuen is a 65 y.o. female with a PMHx significant for longstanding hypertension, depression and dyslipidemia, which most recent hospitalization for severe dyspnea, acute heart failure and cardiogenic pulmonary edema in the setting of significantly elevated systolic blood pressure around 200 mmHg. Today, [facility]. Yuen [facility] for [facility]-up care.       Mrs. Yuen denies any acute complaints. Denies orthopnea, PND, fluid retention/edema     ECG: Personally reviewed. normal sinus rhythm.     ECHO (personnaly Reviewed):     1.Left ventricle ejection fraction is normal. The calculated left ventricular ejection fraction is 75%.    2.Normal right ventricular size and systolic function.    3.Mild left atrial chamber enlargement.    4.No hemodynamically significant valvular heart abnormalities.    5.No previous study for comparison.          Physical Examination Review of Systems   /80 (BP Location: Right arm, Patient Position: Sitting, Cuff Size: Adult Large)   Pulse 60   Resp 16   Ht 1.626 m (5' 4\")   Wt 95.3 kg (210 lb)   BMI 36.05 kg/m    Body mass index is 36.05 kg/m .  Wt Readings from Last 3 Encounters:   10/05/21 95.3 kg (210 " lb)   07/12/21 98 kg (216 lb)   06/08/21 95.1 kg (209 lb 9.6 oz)     General Appearance:   no distress, normal body habitus   ENT/Mouth: membranes moist, no oral lesions or bleeding gums.      EYES:  no scleral icterus, normal conjunctivae   Neck: no carotid bruits or thyromegaly   Chest/Lungs:   lungs are clear to auscultation, no rales or wheezing, equal chest wall expansion    Cardiovascular:   Regular. Normal first and second heart sounds with no murmurs, rubs, or gallops; the carotid, radial and posterior tibial pulses are intact, no JVD or LE edema bilaterally    Abdomen:  no organomegaly, masses, bruits, or tenderness; bowel sounds are present   Extremities: no cyanosis or clubbing   Skin: no xanthelasma, warm.    Neurologic: normal gait, normal  bilateral, no tremors     Psychiatric: alert and oriented x3, calm     A complete 10 systems ROS was reviewed  And is negative except what is listed in the HPI.          Medical History  Surgical History Family History Social History   Past Medical History:   Diagnosis Date     Abnormal Pap smear 1985    cryo, nl paps since     Depression      Glaucoma 01/30/2020    left eye      HTN (hypertension)      Hyperlipidemia      Hyperlipidemia LDL goal < 130      Hypertension     Past Surgical History:   Procedure Laterality Date     CHOLECYSTECTOMY       CHOLECYSTECTOMY, LAPOROSCOPIC            OPERATIVE HYSTEROSCOPY WITH MORCELLATOR N/A 3/23/2021    Procedure: Diagnostic Hysteroscopy with biopsy;  Surgeon: Deny Tapia MD;  Location:  OR     OTHER SURGICAL HISTORY      uterine polypectomy      SHOULDER SURGERY Right 3/29/2021    Procedure: CLOSED REDUCTION, SHOULDER;  Surgeon: Melo Mckeon MD;  Location: [facility] [place] [place];  Service: Orthopedics    no family history of premature coronary artery disease Social History     Socioeconomic History     Marital status:      Spouse name: Agusto     Number of children: 2     Years of education: Not on file      Highest education level: Not on file   Occupational History     Occupation:  provider     Employer: [employer]   Tobacco Use     Smoking status: Never Smoker     Smokeless tobacco: Never Used     Tobacco comment: no smokers in the [facility]   Substance and Sexual Activity     Alcohol use: Yes     Comment: occasional     Drug use: No     Sexual activity: Yes     Partners: Male     Comment: post menopausal   Other Topics Concern      Service Not Asked     Blood Transfusions Not Asked     Caffeine Concern Yes     Comment: 2 cans of pop a week     Occupational Exposure Not Asked     Hobby Hazards Not Asked     Sleep Concern Not Asked     Stress Concern Not Asked     Weight Concern Not Asked     Special Diet Not Asked     Back Care Not Asked     Exercise Yes     Comment: walking 45 minutes every other day     Bike Helmet Not Asked     Seat Belt Yes     Self-Exams Yes     Parent/sibling w/ CABG, MI or angioplasty before 65F 55M? Not Asked   Social History Narrative     Not on file     Social Determinants of Health     Financial Resource Strain: Low Risk      Difficulty of Paying Living Expenses: Not hard at all   Food Insecurity: No Food Insecurity     Worried About Running Out of Food in the Last Year: Never true     Ran Out of Food in the Last Year: Never true   Transportation Needs: No Transportation Needs     Lack of Transportation (Medical): No     Lack of Transportation (Non-Medical): No   Physical Activity:      Days of Exercise per Week:      Minutes of Exercise per Session:    Stress:      Feeling of Stress :    Social Connections:      Frequency of Communication with Friends and Family:      Frequency of Social Gatherings with Friends and Family:      Attends Episcopal Services:      Active Member of Clubs or Organizations:      Attends Club or Organization Meetings:      Marital Status:    Intimate Partner Violence:      Fear of Current or Ex-Partner:       Emotionally Abused:      Physically Abused:      Sexually Abused:            Lab Results    Chemistry/lipid CBC Cardiac Enzymes/BNP/TSH/INR   Lab Results   Component Value Date    CHOL 185 03/15/2021    HDL 69 03/15/2021    TRIG 87 03/15/2021    BUN 21 04/06/2021     04/06/2021    CO2 28 04/06/2021    Lab Results   Component Value Date    WBC 10.1 03/29/2021    HGB 13.4 03/29/2021    HCT 41.1 03/29/2021    MCV 89 03/29/2021     03/31/2021    Lab Results   Component Value Date    TROPONINI 0.04 03/27/2021     (H) 03/27/2021    INR 0.94 03/27/2021     Lab Results   Component Value Date    TROPONINI 0.04 03/27/2021          Weight:    Wt Readings from Last 3 Encounters:   07/12/21 98 kg (216 lb)   06/08/21 95.1 kg (209 lb 9.6 oz)   05/18/21 93 kg (205 lb)       Allergies  Allergies   Allergen Reactions     Triamterene Hives         Surgical History  Past Surgical History:   Procedure Laterality Date     CHOLECYSTECTOMY       CHOLECYSTECTOMY, LAPOROSCOPIC            OPERATIVE HYSTEROSCOPY WITH MORCELLATOR N/A 3/23/2021    Procedure: Diagnostic Hysteroscopy with biopsy;  Surgeon: Deny Tapia MD;  Location:  OR     OTHER SURGICAL HISTORY      uterine polypectomy      SHOULDER SURGERY Right 3/29/2021    Procedure: CLOSED REDUCTION, SHOULDER;  Surgeon: Melo Mckeon MD;  Location: [facility] [place];  Service: Orthopedics       Social History  Tobacco:   History   Smoking Status     Never Smoker   Smokeless Tobacco     Never Used     Comment: no smokers in the [facility]    Alcohol:   Social History    Substance and Sexual Activity      Alcohol use: Yes        Comment: occasional   Illicit Drugs:   History   Drug Use No       Family History  Family History   Problem Relation Age of Onset     Breast Cancer Mother      Hypertension Mother      Lipids Mother      Cancer Father         kidney cancer     Diabetes Paternal Grandfather         adult     Breast Cancer Sister      Cancer Sister       Heart Disease Maternal Aunt      Cancer Sister         pancreatic          Sneha Morrison MD on 10/5/2021      cc: Colleen Marroquin        Thank you for allowing me to participate in the care of your patient.      Sincerely,     Sneha Morrison MD     [facility]  cc:   Sneha Morrison MD  [address] [place] [address]

## 2021-10-11 ENCOUNTER — TRANSFERRED RECORDS (OUTPATIENT)
Dept: HEALTH INFORMATION MANAGEMENT | Facility: CLINIC | Age: 66
End: 2021-10-11

## 2021-11-12 ENCOUNTER — ANCILLARY PROCEDURE (OUTPATIENT)
Dept: MAMMOGRAPHY | Facility: CLINIC | Age: 66
End: 2021-11-12
Attending: FAMILY MEDICINE
Payer: COMMERCIAL

## 2021-11-12 DIAGNOSIS — Z12.31 VISIT FOR SCREENING MAMMOGRAM: ICD-10-CM

## 2021-11-12 PROCEDURE — 77067 SCR MAMMO BI INCL CAD: CPT | Mod: TC | Performed by: RADIOLOGY

## 2021-11-12 PROCEDURE — 77063 BREAST TOMOSYNTHESIS BI: CPT | Mod: TC | Performed by: RADIOLOGY

## 2021-12-03 ENCOUNTER — TRANSFERRED RECORDS (OUTPATIENT)
Dept: HEALTH INFORMATION MANAGEMENT | Facility: CLINIC | Age: 66
End: 2021-12-03
Payer: COMMERCIAL

## 2021-12-11 DIAGNOSIS — F41.9 ANXIETY: ICD-10-CM

## 2021-12-11 DIAGNOSIS — F32.3 MAJOR DEPRESSIVE DISORDER, SINGLE EPISODE, SEVERE WITH PSYCHOTIC FEATURES (H): ICD-10-CM

## 2021-12-11 NOTE — LETTER
December 13, 2021    Lala A Wilfrid  [address]  [facility] [address]    Odell Roberthy,     I received a refill request for gabapentin and sertraline.  I have sent a 90 day supply to your pharmacy.  You are due for wellness exam in March; please call now as those types of appointments are already booking into March.    You will need labs prior to your visit so please schedule that appointment as well.      You are due now for the mood questionnaires. I have included them with this message; please complete them and send them back.      Colleen Sherwood MD

## 2021-12-12 RX ORDER — GABAPENTIN 300 MG/1
300 CAPSULE ORAL 3 TIMES DAILY
Qty: 270 CAPSULE | Refills: 0 | Status: SHIPPED | OUTPATIENT
Start: 2021-12-12 | End: 2022-03-01

## 2021-12-12 NOTE — TELEPHONE ENCOUNTER
Please mail letter      Odell Montoya,    I received a refill request for gabapentin and sertraline.  I have sent a 90 day supply to your pharmacy.  You are due for wellness exam in March; please call now as those types of appointments are already booking into March.    You will need labs prior to your visit so please schedule that appointment as well.     You are due now for the mood questionnaires. I have included them with this message; please complete them and send them back.     Colleen Sherwood MD          TC: please include [id number] and [id number] with return envelope.

## 2021-12-13 ENCOUNTER — TRANSFERRED RECORDS (OUTPATIENT)
Dept: HEALTH INFORMATION MANAGEMENT | Facility: CLINIC | Age: 66
End: 2021-12-13
Payer: COMMERCIAL

## 2021-12-27 ASSESSMENT — ANXIETY QUESTIONNAIRES
1. FEELING NERVOUS, ANXIOUS, OR ON EDGE: NOT AT ALL
2. NOT BEING ABLE TO STOP OR CONTROL WORRYING: NOT AT ALL
GAD7 TOTAL SCORE: 0
7. FEELING AFRAID AS IF SOMETHING AWFUL MIGHT HAPPEN: NOT AT ALL
3. WORRYING TOO MUCH ABOUT DIFFERENT THINGS: NOT AT ALL
6. BECOMING EASILY ANNOYED OR IRRITABLE: NOT AT ALL
5. BEING SO RESTLESS THAT IT IS HARD TO SIT STILL: NOT AT ALL
IF YOU CHECKED OFF ANY PROBLEMS ON THIS QUESTIONNAIRE, HOW DIFFICULT HAVE THESE PROBLEMS MADE IT FOR YOU TO DO YOUR WORK, TAKE CARE OF THINGS AT HOME, OR GET ALONG WITH OTHER PEOPLE: NOT DIFFICULT AT ALL

## 2021-12-27 ASSESSMENT — PATIENT HEALTH QUESTIONNAIRE - PHQ9
SUM OF ALL RESPONSES TO PHQ QUESTIONS 1-9: 0
5. POOR APPETITE OR OVEREATING: NOT AT ALL

## 2021-12-28 ASSESSMENT — ANXIETY QUESTIONNAIRES: GAD7 TOTAL SCORE: 0

## 2022-02-10 DIAGNOSIS — F41.9 ANXIETY: ICD-10-CM

## 2022-02-10 DIAGNOSIS — F32.3 MAJOR DEPRESSIVE DISORDER, SINGLE EPISODE, SEVERE WITH PSYCHOTIC FEATURES (H): ICD-10-CM

## 2022-02-10 RX ORDER — ARIPIPRAZOLE 5 MG/1
5 TABLET ORAL EVERY MORNING
Qty: 90 TABLET | Refills: 0 | Status: SHIPPED | OUTPATIENT
Start: 2022-02-10 | End: 2022-03-01

## 2022-02-15 NOTE — PATIENT INSTRUCTIONS
Patient Education   Personalized Prevention Plan  You are due for the preventive services outlined below.  Your care team is available to assist you in scheduling these services.  If you have already completed any of these items, please share that information with your care team to update in your medical record.  Health Maintenance Due   Topic Date Due     ANNUAL REVIEW OF  ORDERS  Never done     Osteoporosis Screening  06/16/2014     FALL RISK ASSESSMENT  03/15/2022           Continue all medications.    Please  call [phone] to schedule your mammogram and dexa scan in November.

## 2022-02-15 NOTE — PROGRESS NOTES
"SUBJECTIVE:   Lala Yuen is a 66 year old female who presents for Preventive Visit.      Patient has been advised of split billing requirements and indicates understanding: Yes  Are you in the first 12 months of your Medicare coverage?  No    Healthy Habits:     In general, how would you rate your overall health?  Good    Frequency of exercise:  1 day/week    Duration of exercise:  Less than 15 minutes    Do you usually eat at least 4 servings of fruit and vegetables a day, include whole grains    & fiber and avoid regularly eating high fat or \"junk\" foods?  Yes    Taking medications regularly:  Yes    Medication side effects:  Lightheadedness    Ability to successfully perform activities of daily living:  No assistance needed    Home Safety:  No safety concerns identified    Hearing Impairment:  No hearing concerns    In the past 6 months, have you been bothered by leaking of urine?  No    In general, how would you rate your overall mental or emotional health?  Good      PHQ-2 Total Score: 0    Additional concerns today:  No    Do you feel safe in your environment? Yes    Have you ever done Advance Care Planning? (For example, a Health Directive, POLST, or a discussion with a medical provider or your loved ones about your wishes): Yes, advance care planning is on file.       Fall risk  Fallen 2 or more times in the past year?: No  Any fall with injury in the past year?: Yes    Cognitive Screening   1) Repeat 3 items (Leader, Season, Table)    2) Clock draw: NORMAL  3) 3 item recall: Recalls 3 objects  Results: 3 items recalled: COGNITIVE IMPAIRMENT LESS LIKELY    Mini-CogTM Copyright PETRONA Burton. Licensed by the author for use in [facility]; reprinted with permission ([email or web address]). All rights reserved.          Reviewed and updated as needed this visit by clinical staff   Tobacco  Allergies  Meds  Problems  Med Hx  Surg Hx  Fam Hx  Soc   Hx        Reviewed and updated as needed this visit " by Provider   Tobacco  Allergies  Meds  Problems  Med Hx  Surg Hx  Fam Hx         Social History     Tobacco Use     Smoking status: Never Smoker     Smokeless tobacco: Never Used     Tobacco comment: no smokers in the [facility]   Substance Use Topics     Alcohol use: Yes     Comment: occasional         Alcohol Use 3/1/2022   Prescreen: >3 drinks/day or >7 drinks/week? Not Applicable   Prescreen: >3 drinks/day or >7 drinks/week? -               Current providers sharing in care for this patient include:   Patient Care Team:  Colleen Marroquin MD as PCP - General (Family Medicine)  Colleen Marroquin MD as Assigned PCP  Deny Tapia MD as Assigned OBGYN Provider  Sneha Morrison MD as Assigned Heart and Vascular Provider    The following health maintenance items are reviewed in Epic and correct as of today:  Health Maintenance Due   Topic Date Due     ANNUAL REVIEW OF  ORDERS  Never done     DEXA  2014     FALL RISK ASSESSMENT  03/15/2022     G 2 P 2   No LMP recorded. Patient is postmenopausal.     Fasting: No   Td:tdap 2019       Flu: 22       Covid: Pf boost 2021      Shingrix: done      PPV: done      NO - age 65 - see link Cervical Cytology Screening Guidelines               Cholesterol:   Lab Results   Component Value Date    CHOL 165 2022    CHOL 185 03/15/2021     Lab Results   Component Value Date    HDL 61 2022    HDL 69 03/15/2021     Lab Results   Component Value Date    LDL 83 2022    LDL 99 03/15/2021     Lab Results   Component Value Date    TRIG 103 2022    TRIG 87 03/15/2021     Lab Results   Component Value Date    CHOLHDLRATIO 2.5 2015         MM2021  Dexa:   due      Flex/colo: 3/2021       Seat Belt: Yes    Sunscreen use: Yes   Calcium Intake: adeq  Health Care Directive: No  Sexually Active: No    Current contraception: none  History of abnormal Pap smear: Yes: see pmh  Family history of colon/breast/ovarian  cancer: Yes: see [facility]  Regular self breast exam: Yes  History of abnormal mammogram: No      Labs reviewed in EPIC  BP Readings from Last 3 Encounters:   03/01/22 134/80   10/05/21 136/80   07/12/21 118/78    Wt Readings from Last 3 Encounters:   03/01/22 108 kg (238 lb)   10/05/21 95.3 kg (210 lb)   07/12/21 98 kg (216 lb)                  Patient Active Problem List   Diagnosis     Hyperlipidemia with target LDL less than 130     Cervical polyp     Hypertension goal BP (blood pressure) < 140/90     Morbid obesity due to excess calories (H)     Post-menopause bleeding     Major depressive disorder, single episode, severe with psychotic features (H)     Flash pulmonary edema (H)     Abnormal uterine bleeding due to endometrial polyp     Chronic heart failure with preserved ejection fraction (H)     Past Surgical History:   Procedure Laterality Date     CHOLECYSTECTOMY       CHOLECYSTECTOMY, LAPOROSCOPIC            OPERATIVE HYSTEROSCOPY WITH MORCELLATOR N/A 3/23/2021    Procedure: Diagnostic Hysteroscopy with biopsy;  Surgeon: Deny Tapia MD;  Location:  OR     OTHER SURGICAL HISTORY      uterine polypectomy      SHOULDER SURGERY Right 3/29/2021    Procedure: CLOSED REDUCTION, SHOULDER;  Surgeon: Melo Mckeon MD;  Location: [facility];  Service: Orthopedics       Social History     Tobacco Use     Smoking status: Never Smoker     Smokeless tobacco: Never Used     Tobacco comment: no smokers in the [facility]   Substance Use Topics     Alcohol use: Yes     Comment: occasional     Family History   Problem Relation Age of Onset     Breast Cancer Mother      Hypertension Mother      Lipids Mother      Cancer Father         kidney cancer     Diabetes Paternal Grandfather         adult     Breast Cancer Sister      Cancer Sister      Heart Disease Maternal Aunt      Cancer Sister         pancreatic         Current Outpatient Medications   Medication Sig Dispense Refill     ARIPiprazole (ABILIFY) 5 MG  tablet Take 1 tablet (5 mg) by mouth every morning 90 tablet 1     aspirin 81 MG EC tablet Take 81 mg by mouth daily With a meal        atorvastatin (LIPITOR) 10 MG tablet Take 1 tablet (10 mg) by mouth daily 90 tablet 3     CALCIUM 600 + D 600-200 MG-UNIT OR TABS 2 pill a day  3     CENTRUM SILVER OR TABS ONE DAILY 0 0     furosemide (LASIX) 40 MG tablet Take 1 tablet (40 mg) by mouth daily 90 tablet 1     gabapentin (NEURONTIN) 300 MG capsule Take 1 capsule (300 mg) by mouth 3 times daily 270 capsule 1     losartan (COZAAR) 50 MG tablet Take 1 tablet (50 mg) by mouth daily 90 tablet 1     sertraline (ZOLOFT) 50 MG tablet Take 2.5 tablets (125 mg) by mouth daily 225 tablet 1     timolol maleate (TIMOPTIC) 0.5 % ophthalmic solution INSTILL ONE DROP INTO EACH EYE ONCE DAILY         Any new diagnosis of family breast, ovarian, or bowel cancer? No    Breast CA Risk Assessment (FHS-7) 3/15/2021   Do you have a family history of breast, colon, or ovarian cancer? No / Unknown         Mammogram Screening: Recommended mammography every 1-2 years with patient discussion and risk factor consideration  Pertinent mammograms are reviewed under the imaging tab.    Review of Systems   Constitutional: Negative for chills and fever.   HENT: Negative for congestion, hearing loss and sore throat.    Eyes: Negative for pain and visual disturbance.   Respiratory: Negative for cough and shortness of breath.    Cardiovascular: Negative for chest pain, palpitations and peripheral edema.   Gastrointestinal: Negative for abdominal pain, constipation, diarrhea, heartburn, hematochezia and nausea.   Genitourinary: Negative for dysuria, frequency, genital sores, hematuria and urgency.   Musculoskeletal: Positive for arthralgias. Negative for joint swelling and myalgias.   Skin: Negative for rash.   Neurological: Positive for dizziness. Negative for weakness, headaches and paresthesias.   Psychiatric/Behavioral: Negative for mood changes. The  "patient is nervous/anxious.          OBJECTIVE:   /80   Pulse 78   Temp (!) 96  F (35.6  C) (Tympanic)   Resp 16   Ht 1.626 m (5' 4\")   Wt 108 kg (238 lb)   SpO2 93%   BMI 40.85 kg/m   Estimated body mass index is 40.85 kg/m  as calculated from the following:    Height as of this encounter: 1.626 m (5' 4\").    Weight as of this encounter: 108 kg (238 lb).  Physical Exam  GENERAL APPEARANCE: healthy, alert and no distress  EYES: Eyes grossly normal to inspection, PERRL and conjunctivae and sclerae normal  HENT: ear canals and TM's normal, nose and mouth without ulcers or lesions, oropharynx clear and oral mucous membranes moist  NECK: no adenopathy, no asymmetry, masses, or scars and thyroid normal to palpation  RESP: lungs clear to auscultation - no rales, rhonchi or wheezes  BREAST: normal without masses, tenderness or nipple discharge and no palpable axillary masses or adenopathy  CV: regular rate and rhythm, normal S1 S2, no S3 or S4, no murmur, click or rub, no peripheral edema and peripheral pulses strong  ABDOMEN: soft, nontender, no hepatosplenomegaly, no masses and bowel sounds normal  MS: no musculoskeletal defects are noted and gait is age appropriate without ataxia  SKIN: no suspicious lesions or rashes  NEURO: Normal strength and tone, sensory exam grossly normal, mentation intact and speech normal  PSYCH: mentation appears normal and affect normal/bright    Diagnostic Test Results:  Labs reviewed in Epic    ASSESSMENT / PLAN:   (Z00.00) Encounter for Medicare annual wellness exam  (primary encounter diagnosis)  Comment: preventive needs reviewed   Plan: see orders in Epic.     (F32.3) Major depressive disorder, single episode, severe with psychotic features (H)  (F41.9) Anxiety  Comment: stable  Plan: ARIPiprazole (ABILIFY) 5 MG tablet, sertraline         (ZOLOFT) 50 MG tablet, gabapentin (NEURONTIN)         300 MG capsule, OFFICE/OUTPT VISIT,EST,LEVL IV         Refill x 6 months f/u 6 " "months for OV and labs      (E66.01) Morbid obesity due to excess calories (H)  Comment: trending up  Plan: pt has plan for wt loss, will contact if wants referral to wt management    (I10) Hypertension goal BP (blood pressure) < 140/90  Comment: to goal  Plan: losartan (COZAAR) 50 MG tablet        Continue losartan 50 mg.  Refill x 6 months f/u 6 months for OV and labs     (E78.5) Hyperlipidemia with target LDL less than 130  Comment: stable  Plan: atorvastatin (LIPITOR) 10 MG tablet,         OFFICE/OUTPT VISIT,EST,LEVL IV        Refill x 1 yr     (I50.32) Chronic diastolic heart failure (H)  (J96.01) Acute respiratory failure with hypoxia (H)  Comment: stable and improved  Plan: furosemide (LASIX) 40 MG tablet, OFFICE/OUTPT         VISIT,EST,LEVL IV        Resolve acute resp failure  Refill x 6 months     (Z78.0) Asymptomatic menopausal state  Comment: due  Plan: DEXA HIP/PELVIS/SPINE - Future                  COUNSELING:  Reviewed preventive health counseling, as reflected in patient instructions  Special attention given to:       Regular exercise       Healthy diet/nutrition    Estimated body mass index is 40.85 kg/m  as calculated from the following:    Height as of this encounter: 1.626 m (5' 4\").    Weight as of this encounter: 108 kg (238 lb).        She reports that she has never smoked. She has never used smokeless tobacco.      Appropriate preventive services were discussed with this patient, including applicable screening as appropriate for cardiovascular disease, diabetes, osteopenia/osteoporosis, and glaucoma.  As appropriate for age/gender, discussed screening for colorectal cancer, prostate cancer, breast cancer, and cervical cancer. Checklist reviewing preventive services available has been given to the patient.    Reviewed patients plan of care and provided an AVS. The Intermediate Care Plan ( asthma action plan, low back pain action plan, and migraine action plan) for Lala meets the Care Plan " requirement. This Care Plan has been established and reviewed with the Patient.    Counseling Resources:  ATP IV Guidelines  Pooled Cohorts Equation Calculator  Breast Cancer Risk Calculator  Breast Cancer: Medication to Reduce Risk  FRAX Risk Assessment  ICSI Preventive Guidelines  Dietary Guidelines for Americans, 2010  USDA's MyPlate  ASA Prophylaxis  Lung CA Screening    Colleen Marroquin MD  [facility]    Identified Health Risks:

## 2022-02-17 PROBLEM — Z71.89 ADVANCED DIRECTIVES, COUNSELING/DISCUSSION: Status: RESOLVED | Noted: 2017-01-24 | Resolved: 2021-04-29

## 2022-02-18 ENCOUNTER — DOCUMENTATION ONLY (OUTPATIENT)
Dept: LAB | Facility: CLINIC | Age: 67
End: 2022-02-18
Payer: COMMERCIAL

## 2022-02-18 DIAGNOSIS — I10 HYPERTENSION GOAL BP (BLOOD PRESSURE) < 140/90: Primary | ICD-10-CM

## 2022-02-18 DIAGNOSIS — E78.5 HYPERLIPIDEMIA WITH TARGET LDL LESS THAN 130: ICD-10-CM

## 2022-02-18 NOTE — PROGRESS NOTES
Lala Yuen has an upcoming lab appointment:    Future Appointments   Date Time Provider Department Center   2/21/2022  8:30 AM [facility]   3/1/2022  8:55 AM [facility]     Patient is scheduled for the following lab(s):     There is no order available. Please review and place either future orders or HMPO (Review of Health Maintenance Protocol Orders), as appropriate.    Health Maintenance Due   Topic     ANNUAL REVIEW OF HM ORDERS      BMP      LIPID      Heriberto Gifford

## 2022-02-21 ENCOUNTER — LAB (OUTPATIENT)
Dept: LAB | Facility: CLINIC | Age: 67
End: 2022-02-21
Payer: COMMERCIAL

## 2022-02-21 DIAGNOSIS — I10 HYPERTENSION GOAL BP (BLOOD PRESSURE) < 140/90: ICD-10-CM

## 2022-02-21 DIAGNOSIS — E78.5 HYPERLIPIDEMIA WITH TARGET LDL LESS THAN 130: ICD-10-CM

## 2022-02-21 LAB
ANION GAP SERPL CALCULATED.3IONS-SCNC: 6 MMOL/L (ref 3–14)
BUN SERPL-MCNC: 21 MG/DL (ref 7–30)
CALCIUM SERPL-MCNC: 9.3 MG/DL (ref 8.5–10.1)
CHLORIDE BLD-SCNC: 106 MMOL/L (ref 94–109)
CHOLEST SERPL-MCNC: 165 MG/DL
CO2 SERPL-SCNC: 25 MMOL/L (ref 20–32)
CREAT SERPL-MCNC: 0.55 MG/DL (ref 0.52–1.04)
FASTING STATUS PATIENT QL REPORTED: YES
GFR SERPL CREATININE-BSD FRML MDRD: >90 ML/MIN/1.73M2
GLUCOSE BLD-MCNC: 106 MG/DL (ref 70–99)
HDLC SERPL-MCNC: 61 MG/DL
LDLC SERPL CALC-MCNC: 83 MG/DL
NONHDLC SERPL-MCNC: 104 MG/DL
POTASSIUM BLD-SCNC: 4 MMOL/L (ref 3.4–5.3)
SODIUM SERPL-SCNC: 137 MMOL/L (ref 133–144)
TRIGL SERPL-MCNC: 103 MG/DL

## 2022-02-21 PROCEDURE — 80061 LIPID PANEL: CPT

## 2022-02-21 PROCEDURE — 36415 COLL VENOUS BLD VENIPUNCTURE: CPT

## 2022-02-21 PROCEDURE — 80048 BASIC METABOLIC PNL TOTAL CA: CPT

## 2022-02-25 DIAGNOSIS — I10 HYPERTENSION GOAL BP (BLOOD PRESSURE) < 140/90: ICD-10-CM

## 2022-02-25 RX ORDER — LOSARTAN POTASSIUM 100 MG/1
100 TABLET ORAL DAILY
Qty: 90 TABLET | Refills: 1 | Status: SHIPPED | OUTPATIENT
Start: 2022-02-25 | End: 2022-03-01

## 2022-02-25 RX ORDER — LOSARTAN POTASSIUM 100 MG/1
100 TABLET ORAL DAILY
Qty: 90 TABLET | Refills: 0 | Status: SHIPPED | OUTPATIENT
Start: 2022-02-25 | End: 2022-02-25

## 2022-03-01 ENCOUNTER — OFFICE VISIT (OUTPATIENT)
Dept: FAMILY MEDICINE | Facility: CLINIC | Age: 67
End: 2022-03-01
Payer: COMMERCIAL

## 2022-03-01 VITALS
OXYGEN SATURATION: 93 % | SYSTOLIC BLOOD PRESSURE: 134 MMHG | HEART RATE: 78 BPM | RESPIRATION RATE: 16 BRPM | HEIGHT: 64 IN | TEMPERATURE: 96 F | WEIGHT: 238 LBS | DIASTOLIC BLOOD PRESSURE: 80 MMHG | BODY MASS INDEX: 40.63 KG/M2

## 2022-03-01 DIAGNOSIS — I10 HYPERTENSION GOAL BP (BLOOD PRESSURE) < 140/90: ICD-10-CM

## 2022-03-01 DIAGNOSIS — I50.32 CHRONIC DIASTOLIC HEART FAILURE (H): ICD-10-CM

## 2022-03-01 DIAGNOSIS — F41.9 ANXIETY: ICD-10-CM

## 2022-03-01 DIAGNOSIS — Z78.0 ASYMPTOMATIC MENOPAUSAL STATE: ICD-10-CM

## 2022-03-01 DIAGNOSIS — E78.5 HYPERLIPIDEMIA WITH TARGET LDL LESS THAN 130: ICD-10-CM

## 2022-03-01 DIAGNOSIS — J96.01 ACUTE RESPIRATORY FAILURE WITH HYPOXIA (H): ICD-10-CM

## 2022-03-01 DIAGNOSIS — E66.01 MORBID OBESITY DUE TO EXCESS CALORIES (H): ICD-10-CM

## 2022-03-01 DIAGNOSIS — Z00.00 ENCOUNTER FOR MEDICARE ANNUAL WELLNESS EXAM: Primary | ICD-10-CM

## 2022-03-01 DIAGNOSIS — F32.3 MAJOR DEPRESSIVE DISORDER, SINGLE EPISODE, SEVERE WITH PSYCHOTIC FEATURES (H): ICD-10-CM

## 2022-03-01 PROCEDURE — 99397 PER PM REEVAL EST PAT 65+ YR: CPT | Mod: 25 | Performed by: FAMILY MEDICINE

## 2022-03-01 PROCEDURE — G0008 ADMIN INFLUENZA VIRUS VAC: HCPCS | Performed by: FAMILY MEDICINE

## 2022-03-01 PROCEDURE — 90662 IIV NO PRSV INCREASED AG IM: CPT | Performed by: FAMILY MEDICINE

## 2022-03-01 PROCEDURE — 99214 OFFICE O/P EST MOD 30 MIN: CPT | Mod: 25 | Performed by: FAMILY MEDICINE

## 2022-03-01 RX ORDER — GABAPENTIN 300 MG/1
300 CAPSULE ORAL 3 TIMES DAILY
Qty: 270 CAPSULE | Refills: 1 | Status: SHIPPED | OUTPATIENT
Start: 2022-03-01 | End: 2022-12-29

## 2022-03-01 RX ORDER — FUROSEMIDE 40 MG
40 TABLET ORAL DAILY
Qty: 90 TABLET | Refills: 1 | Status: SHIPPED | OUTPATIENT
Start: 2022-03-01 | End: 2022-10-14

## 2022-03-01 RX ORDER — ARIPIPRAZOLE 5 MG/1
5 TABLET ORAL EVERY MORNING
Qty: 90 TABLET | Refills: 1 | Status: SHIPPED | OUTPATIENT
Start: 2022-03-01 | End: 2022-11-07

## 2022-03-01 RX ORDER — ATORVASTATIN CALCIUM 10 MG/1
10 TABLET, FILM COATED ORAL DAILY
Qty: 90 TABLET | Refills: 3 | Status: SHIPPED | OUTPATIENT
Start: 2022-03-01 | End: 2023-03-06

## 2022-03-01 RX ORDER — LOSARTAN POTASSIUM 50 MG/1
50 TABLET ORAL DAILY
Qty: 90 TABLET | Refills: 1 | Status: SHIPPED | OUTPATIENT
Start: 2022-03-01 | End: 2022-11-21

## 2022-03-01 ASSESSMENT — ENCOUNTER SYMPTOMS
PARESTHESIAS: 0
HEMATOCHEZIA: 0
SORE THROAT: 0
CONSTIPATION: 0
SHORTNESS OF BREATH: 0
NAUSEA: 0
PALPITATIONS: 0
HEARTBURN: 0
WEAKNESS: 0
FREQUENCY: 0
MYALGIAS: 0
HEMATURIA: 0
DYSURIA: 0
NERVOUS/ANXIOUS: 1
ABDOMINAL PAIN: 0
EYE PAIN: 0
COUGH: 0
ARTHRALGIAS: 1
JOINT SWELLING: 0
HEADACHES: 0
CHILLS: 0
DIARRHEA: 0
FEVER: 0
DIZZINESS: 1

## 2022-03-01 ASSESSMENT — ACTIVITIES OF DAILY LIVING (ADL): CURRENT_FUNCTION: NO ASSISTANCE NEEDED

## 2022-03-01 ASSESSMENT — PAIN SCALES - GENERAL: PAINLEVEL: MODERATE PAIN (4)

## 2022-09-06 DIAGNOSIS — F41.9 ANXIETY: ICD-10-CM

## 2022-09-06 DIAGNOSIS — F32.3 MAJOR DEPRESSIVE DISORDER, SINGLE EPISODE, SEVERE WITH PSYCHOTIC FEATURES (H): ICD-10-CM

## 2022-09-06 NOTE — LETTER
September 8, 2022    Lala OTERO Wilfrid  [address]  [facility] [address]        Odell Montoya,     I received a refill request for sertraline.  I have sent a 6 month supply to your pharmacy.  You are due for labs now; I have ordered the tests, please call to make a non-fasting lab appointment within 2 weeks.      You are due now for the mood questionnaires. I have included them with this message; please complete them and send them back.      We have flu shots available now and the updated Covid vaccines will be available Sept 15th.  I recommend both.      Colleen Marroquin MD

## 2022-09-08 NOTE — TELEPHONE ENCOUNTER
Please mail letter, include [id number]/gad7 and return envelope    Odell Montoya,    I received a refill request for sertraline.  I have sent a 6 month supply to your pharmacy.  You are due for labs now; I have ordered the tests, please call to make a non-fasting lab appointment within 2 weeks.     You are due now for the mood questionnaires. I have included them with this message; please complete them and send them back.     We have flu shots available now and the updated Covid vaccines will be available Sept 15th.  I recommend both.     Colleen Marroquin MD

## 2022-09-26 ENCOUNTER — DOCUMENTATION ONLY (OUTPATIENT)
Dept: LAB | Facility: CLINIC | Age: 67
End: 2022-09-26

## 2022-09-26 DIAGNOSIS — I10 HYPERTENSION GOAL BP (BLOOD PRESSURE) < 140/90: Primary | ICD-10-CM

## 2022-09-26 NOTE — PROGRESS NOTES
Patient has an upcoming lab appointment on 09.29.2022 at 10:30 am. Please review and place future orders that may be needed. Otherwise please call patient to cancel lab appointment.    Thank you,  BE lab staff

## 2022-09-29 ENCOUNTER — LAB (OUTPATIENT)
Dept: LAB | Facility: CLINIC | Age: 67
End: 2022-09-29
Payer: COMMERCIAL

## 2022-09-29 DIAGNOSIS — I10 HYPERTENSION GOAL BP (BLOOD PRESSURE) < 140/90: ICD-10-CM

## 2022-09-29 PROCEDURE — 36415 COLL VENOUS BLD VENIPUNCTURE: CPT

## 2022-09-29 PROCEDURE — 80048 BASIC METABOLIC PNL TOTAL CA: CPT

## 2022-09-30 LAB
ANION GAP SERPL CALCULATED.3IONS-SCNC: 7 MMOL/L (ref 3–14)
BUN SERPL-MCNC: 18 MG/DL (ref 7–30)
CALCIUM SERPL-MCNC: 9.8 MG/DL (ref 8.5–10.1)
CHLORIDE BLD-SCNC: 106 MMOL/L (ref 94–109)
CO2 SERPL-SCNC: 26 MMOL/L (ref 20–32)
CREAT SERPL-MCNC: 0.59 MG/DL (ref 0.52–1.04)
GFR SERPL CREATININE-BSD FRML MDRD: >90 ML/MIN/1.73M2
GLUCOSE BLD-MCNC: 96 MG/DL (ref 70–99)
POTASSIUM BLD-SCNC: 4.1 MMOL/L (ref 3.4–5.3)
SODIUM SERPL-SCNC: 139 MMOL/L (ref 133–144)

## 2022-10-11 ENCOUNTER — ANCILLARY PROCEDURE (OUTPATIENT)
Dept: BONE DENSITY | Facility: CLINIC | Age: 67
End: 2022-10-11
Attending: FAMILY MEDICINE
Payer: COMMERCIAL

## 2022-10-11 DIAGNOSIS — Z78.0 ASYMPTOMATIC MENOPAUSAL STATE: ICD-10-CM

## 2022-10-11 PROCEDURE — 77080 DXA BONE DENSITY AXIAL: CPT | Performed by: INTERNAL MEDICINE

## 2022-10-14 DIAGNOSIS — I50.32 CHRONIC DIASTOLIC HEART FAILURE (H): ICD-10-CM

## 2022-10-14 RX ORDER — FUROSEMIDE 40 MG
40 TABLET ORAL DAILY
Qty: 90 TABLET | Refills: 0 | Status: SHIPPED | OUTPATIENT
Start: 2022-10-14 | End: 2023-01-24

## 2022-11-07 DIAGNOSIS — F41.9 ANXIETY: ICD-10-CM

## 2022-11-07 DIAGNOSIS — F32.3 MAJOR DEPRESSIVE DISORDER, SINGLE EPISODE, SEVERE WITH PSYCHOTIC FEATURES (H): ICD-10-CM

## 2022-11-07 RX ORDER — ARIPIPRAZOLE 5 MG/1
5 TABLET ORAL EVERY MORNING
Qty: 90 TABLET | Refills: 0 | Status: SHIPPED | OUTPATIENT
Start: 2022-11-07 | End: 2023-02-05

## 2022-11-07 NOTE — LETTER
November 8, 2022    Laal BRANDEN Yuen  [address]  [facility] [address]            Odell Montoay,     I received a refill request for abilify.  I have sent a 90 day supply to your pharmacy.    You are due now for the mood questionnaires. I have included them to this message; please complete them and send them back.      Colleen Marroquin MD             Thank you,   Your [facility] Team/  [phone]

## 2022-11-08 NOTE — TELEPHONE ENCOUNTER
Please mail letter with [id number]/gad7 and return envelope    Odell Montoya,    I received a refill request for abilify.  I have sent a 90 day supply to your pharmacy.    You are due now for the mood questionnaires. I have included them to this message; please complete them and send them back.     Colleen Marroquin MD

## 2022-12-08 ENCOUNTER — TELEPHONE (OUTPATIENT)
Dept: FAMILY MEDICINE | Facility: CLINIC | Age: 67
End: 2022-12-08

## 2022-12-08 ASSESSMENT — ANXIETY QUESTIONNAIRES
3. WORRYING TOO MUCH ABOUT DIFFERENT THINGS: NOT AT ALL
2. NOT BEING ABLE TO STOP OR CONTROL WORRYING: NOT AT ALL
1. FEELING NERVOUS, ANXIOUS, OR ON EDGE: NOT AT ALL
6. BECOMING EASILY ANNOYED OR IRRITABLE: NOT AT ALL
5. BEING SO RESTLESS THAT IT IS HARD TO SIT STILL: NOT AT ALL
7. FEELING AFRAID AS IF SOMETHING AWFUL MIGHT HAPPEN: NOT AT ALL
GAD7 TOTAL SCORE: 0
IF YOU CHECKED OFF ANY PROBLEMS ON THIS QUESTIONNAIRE, HOW DIFFICULT HAVE THESE PROBLEMS MADE IT FOR YOU TO DO YOUR WORK, TAKE CARE OF THINGS AT HOME, OR GET ALONG WITH OTHER PEOPLE: NOT DIFFICULT AT ALL
GAD7 TOTAL SCORE: 0

## 2022-12-08 ASSESSMENT — PATIENT HEALTH QUESTIONNAIRE - PHQ9
SUM OF ALL RESPONSES TO PHQ QUESTIONS 1-9: 0
5. POOR APPETITE OR OVEREATING: NOT AT ALL

## 2022-12-29 DIAGNOSIS — F32.3 MAJOR DEPRESSIVE DISORDER, SINGLE EPISODE, SEVERE WITH PSYCHOTIC FEATURES (H): ICD-10-CM

## 2022-12-29 RX ORDER — GABAPENTIN 300 MG/1
300 CAPSULE ORAL 3 TIMES DAILY
Qty: 270 CAPSULE | Refills: 0 | Status: SHIPPED | OUTPATIENT
Start: 2022-12-29 | End: 2023-03-06

## 2023-01-24 DIAGNOSIS — I50.32 CHRONIC DIASTOLIC HEART FAILURE (H): ICD-10-CM

## 2023-01-24 RX ORDER — FUROSEMIDE 40 MG
40 TABLET ORAL DAILY
Qty: 90 TABLET | Refills: 0 | Status: SHIPPED | OUTPATIENT
Start: 2023-01-24 | End: 2023-03-06

## 2023-02-03 ENCOUNTER — LAB (OUTPATIENT)
Dept: LAB | Facility: CLINIC | Age: 68
End: 2023-02-03
Payer: COMMERCIAL

## 2023-02-03 DIAGNOSIS — E78.5 HYPERLIPIDEMIA WITH TARGET LDL LESS THAN 130: ICD-10-CM

## 2023-02-03 DIAGNOSIS — I10 HYPERTENSION GOAL BP (BLOOD PRESSURE) < 140/90: ICD-10-CM

## 2023-02-03 LAB
ANION GAP SERPL CALCULATED.3IONS-SCNC: 7 MMOL/L (ref 3–14)
BUN SERPL-MCNC: 20 MG/DL (ref 7–30)
CALCIUM SERPL-MCNC: 10 MG/DL (ref 8.5–10.1)
CHLORIDE BLD-SCNC: 107 MMOL/L (ref 94–109)
CHOLEST SERPL-MCNC: 166 MG/DL
CO2 SERPL-SCNC: 28 MMOL/L (ref 20–32)
CREAT SERPL-MCNC: 0.6 MG/DL (ref 0.52–1.04)
FASTING STATUS PATIENT QL REPORTED: YES
GFR SERPL CREATININE-BSD FRML MDRD: >90 ML/MIN/1.73M2
GLUCOSE BLD-MCNC: 92 MG/DL (ref 70–99)
HDLC SERPL-MCNC: 51 MG/DL
LDLC SERPL CALC-MCNC: 87 MG/DL
NONHDLC SERPL-MCNC: 115 MG/DL
POTASSIUM BLD-SCNC: 4.1 MMOL/L (ref 3.4–5.3)
SODIUM SERPL-SCNC: 142 MMOL/L (ref 133–144)
TRIGL SERPL-MCNC: 138 MG/DL

## 2023-02-03 PROCEDURE — 80048 BASIC METABOLIC PNL TOTAL CA: CPT

## 2023-02-03 PROCEDURE — 80061 LIPID PANEL: CPT

## 2023-02-03 PROCEDURE — 36415 COLL VENOUS BLD VENIPUNCTURE: CPT

## 2023-02-04 DIAGNOSIS — F41.9 ANXIETY: ICD-10-CM

## 2023-02-04 DIAGNOSIS — F32.3 MAJOR DEPRESSIVE DISORDER, SINGLE EPISODE, SEVERE WITH PSYCHOTIC FEATURES (H): ICD-10-CM

## 2023-02-05 RX ORDER — ARIPIPRAZOLE 5 MG/1
5 TABLET ORAL EVERY MORNING
Qty: 90 TABLET | Refills: 0 | Status: SHIPPED | OUTPATIENT
Start: 2023-02-05 | End: 2023-03-06

## 2023-02-16 ENCOUNTER — ANCILLARY PROCEDURE (OUTPATIENT)
Dept: MAMMOGRAPHY | Facility: CLINIC | Age: 68
End: 2023-02-16
Attending: FAMILY MEDICINE
Payer: COMMERCIAL

## 2023-02-16 DIAGNOSIS — Z12.31 VISIT FOR SCREENING MAMMOGRAM: ICD-10-CM

## 2023-02-16 PROCEDURE — 77063 BREAST TOMOSYNTHESIS BI: CPT | Mod: TC | Performed by: RADIOLOGY

## 2023-02-16 PROCEDURE — 77067 SCR MAMMO BI INCL CAD: CPT | Mod: TC | Performed by: RADIOLOGY

## 2023-03-06 ENCOUNTER — OFFICE VISIT (OUTPATIENT)
Dept: FAMILY MEDICINE | Facility: CLINIC | Age: 68
End: 2023-03-06
Payer: COMMERCIAL

## 2023-03-06 ENCOUNTER — ANCILLARY PROCEDURE (OUTPATIENT)
Dept: GENERAL RADIOLOGY | Facility: CLINIC | Age: 68
End: 2023-03-06
Attending: FAMILY MEDICINE
Payer: COMMERCIAL

## 2023-03-06 VITALS
SYSTOLIC BLOOD PRESSURE: 128 MMHG | DIASTOLIC BLOOD PRESSURE: 68 MMHG | TEMPERATURE: 98.3 F | WEIGHT: 238.4 LBS | OXYGEN SATURATION: 94 % | HEART RATE: 70 BPM | BODY MASS INDEX: 40.7 KG/M2 | HEIGHT: 64 IN | RESPIRATION RATE: 20 BRPM

## 2023-03-06 DIAGNOSIS — I50.32 CHRONIC HEART FAILURE WITH PRESERVED EJECTION FRACTION (H): ICD-10-CM

## 2023-03-06 DIAGNOSIS — E78.5 HYPERLIPIDEMIA WITH TARGET LDL LESS THAN 130: ICD-10-CM

## 2023-03-06 DIAGNOSIS — G89.29 CHRONIC PAIN OF LEFT KNEE: ICD-10-CM

## 2023-03-06 DIAGNOSIS — R26.89 BALANCE DISORDER: ICD-10-CM

## 2023-03-06 DIAGNOSIS — Z00.00 ENCOUNTER FOR MEDICARE ANNUAL WELLNESS EXAM: Primary | ICD-10-CM

## 2023-03-06 DIAGNOSIS — M25.562 CHRONIC PAIN OF LEFT KNEE: ICD-10-CM

## 2023-03-06 DIAGNOSIS — F32.3 MAJOR DEPRESSIVE DISORDER, SINGLE EPISODE, SEVERE WITH PSYCHOTIC FEATURES (H): ICD-10-CM

## 2023-03-06 DIAGNOSIS — F41.9 ANXIETY: ICD-10-CM

## 2023-03-06 DIAGNOSIS — I10 HYPERTENSION GOAL BP (BLOOD PRESSURE) < 140/90: ICD-10-CM

## 2023-03-06 DIAGNOSIS — E66.813 CLASS 3 OBESITY: ICD-10-CM

## 2023-03-06 PROBLEM — J81.0 FLASH PULMONARY EDEMA (H): Status: RESOLVED | Noted: 2021-03-27 | Resolved: 2023-03-06

## 2023-03-06 PROBLEM — E66.01 MORBID OBESITY DUE TO EXCESS CALORIES (H): Status: RESOLVED | Noted: 2017-01-24 | Resolved: 2023-03-06

## 2023-03-06 PROCEDURE — 99214 OFFICE O/P EST MOD 30 MIN: CPT | Mod: 25 | Performed by: FAMILY MEDICINE

## 2023-03-06 PROCEDURE — G0438 PPPS, INITIAL VISIT: HCPCS | Performed by: FAMILY MEDICINE

## 2023-03-06 PROCEDURE — 73562 X-RAY EXAM OF KNEE 3: CPT | Mod: TC | Performed by: RADIOLOGY

## 2023-03-06 RX ORDER — ATORVASTATIN CALCIUM 10 MG/1
10 TABLET, FILM COATED ORAL DAILY
Qty: 90 TABLET | Refills: 3 | Status: SHIPPED | OUTPATIENT
Start: 2023-03-06 | End: 2024-03-14

## 2023-03-06 RX ORDER — FUROSEMIDE 40 MG
40 TABLET ORAL DAILY
Qty: 90 TABLET | Refills: 1 | Status: SHIPPED | OUTPATIENT
Start: 2023-03-06 | End: 2023-11-19

## 2023-03-06 RX ORDER — ARIPIPRAZOLE 5 MG/1
5 TABLET ORAL EVERY MORNING
Qty: 90 TABLET | Refills: 1 | Status: SHIPPED | OUTPATIENT
Start: 2023-03-06 | End: 2023-11-01

## 2023-03-06 RX ORDER — GABAPENTIN 300 MG/1
300 CAPSULE ORAL 3 TIMES DAILY
Qty: 270 CAPSULE | Refills: 1 | Status: SHIPPED | OUTPATIENT
Start: 2023-03-06 | End: 2023-09-26

## 2023-03-06 RX ORDER — LOSARTAN POTASSIUM 50 MG/1
50 TABLET ORAL DAILY
Qty: 90 TABLET | Refills: 1 | Status: SHIPPED | OUTPATIENT
Start: 2023-03-06 | End: 2023-11-19

## 2023-03-06 ASSESSMENT — ENCOUNTER SYMPTOMS
HEADACHES: 0
WEAKNESS: 0
FEVER: 0
PALPITATIONS: 0
NAUSEA: 0
SORE THROAT: 0
CONSTIPATION: 0
PARESTHESIAS: 0
HEARTBURN: 0
DYSURIA: 0
EYE PAIN: 0
DIARRHEA: 0
ARTHRALGIAS: 1
SHORTNESS OF BREATH: 0
MYALGIAS: 0
JOINT SWELLING: 0
HEMATOCHEZIA: 0
COUGH: 0
HEMATURIA: 0
FREQUENCY: 0
NERVOUS/ANXIOUS: 0
ABDOMINAL PAIN: 0
CHILLS: 0
BREAST MASS: 0
DIZZINESS: 1

## 2023-03-06 ASSESSMENT — ACTIVITIES OF DAILY LIVING (ADL): CURRENT_FUNCTION: NO ASSISTANCE NEEDED

## 2023-03-06 ASSESSMENT — PAIN SCALES - GENERAL: PAINLEVEL: SEVERE PAIN (7)

## 2023-03-06 NOTE — PATIENT INSTRUCTIONS
Patient Education   Personalized Prevention Plan  You are due for the preventive services outlined below.  Your care team is available to assist you in scheduling these services.  If you have already completed any of these items, please share that information with your care team to update in your medical record.  Health Maintenance Due   Topic Date Due    ANNUAL REVIEW OF HM ORDERS  Never done        Continue all medications, no changes.

## 2023-03-06 NOTE — PROGRESS NOTES
"SUBJECTIVE:   Lindsay is a 67 year old who presents for Preventive Visit.    Patient has been advised of split billing requirements and indicates understanding: Yes  Are you in the first 12 months of your Medicare coverage?  No    Healthy Habits:     In general, how would you rate your overall health?  Good    Frequency of exercise:  2-3 days/week    Duration of exercise:  Less than 15 minutes    Do you usually eat at least 4 servings of fruit and vegetables a day, include whole grains    & fiber and avoid regularly eating high fat or \"junk\" foods?  Yes    Taking medications regularly:  Yes    Medication side effects:  Lightheadedness    Ability to successfully perform activities of daily living:  No assistance needed    Home Safety:  No safety concerns identified    Hearing Impairment:  No hearing concerns    In the past 6 months, have you been bothered by leaking of urine?  No    In general, how would you rate your overall mental or emotional health?  Good      PHQ-2 Total Score: 0    Additional concerns today:  No      Have you ever done Advance Care Planning? (For example, a Health Directive, POLST, or a discussion with a medical provider or your loved ones about your wishes): Yes, advance care planning is on file.       Fall risk  Fallen 2 or more times in the past year?: No  Any fall with injury in the past year?: No    Cognitive Screening Normal cognition based on my direct observation during interview and exam.     PHQ 6/8/2021 12/27/2021 12/8/2022   PHQ-9 Total Score 0 0 0   Q9: Thoughts of better off dead/self-harm past 2 weeks Not at all Not at all Not at all      KOTA-7 SCORE 6/8/2021 12/27/2021 12/8/2022   Total Score 2 0 0        Do you have sleep apnea, excessive snoring or daytime drowsiness?: no    Reviewed and updated as needed this visit by clinical staff   Tobacco  Allergies  Meds  Problems  Med Hx  Surg Hx  Fam Hx          Reviewed and updated as needed this visit by Provider   Tobacco  " Allergies  Meds  Problems  Med Hx  Surg Hx  Fam Hx         Social History     Tobacco Use     Smoking status: Never     Smokeless tobacco: Never     Tobacco comments:     no smokers in the housold   Substance Use Topics     Alcohol use: Yes     Comment: occasional       Alcohol Use 3/6/2023   Prescreen: >3 drinks/day or >7 drinks/week? Not Applicable               Current providers sharing in care for this patient include:   Patient Care Team:  Colleen Marroquin MD as PCP - General (Family Medicine)  Colleen Marroquin MD as Assigned PCP  Sneha Morrison MD as Assigned Heart and Vascular Provider    The following health maintenance items are reviewed in Epic and correct as of today:  Health Maintenance   Topic Date Due     ANNUAL REVIEW OF HM ORDERS  Never done     PHQ-9  06/08/2023     BMP  08/03/2023     LIPID  02/03/2024     MEDICARE ANNUAL WELLNESS VISIT  03/06/2024     FALL RISK ASSESSMENT  03/06/2024     MAMMO SCREENING  02/16/2025     DEXA  10/11/2027     ADVANCE CARE PLANNING  03/06/2028     DTAP/TDAP/TD IMMUNIZATION (4 - Td or Tdap) 01/30/2029     COLORECTAL CANCER SCREENING  03/08/2031     HEPATITIS C SCREENING  Completed     DEPRESSION ACTION PLAN  Completed     INFLUENZA VACCINE  Completed     Pneumococcal Vaccine: 65+ Years  Completed     ZOSTER IMMUNIZATION  Completed     COVID-19 Vaccine  Completed     IPV IMMUNIZATION  Aged Out     MENINGITIS IMMUNIZATION  Aged Out     G 2 P 2   No LMP recorded. Patient is postmenopausal.     Fasting: No   Td:tdap 1/2019       Flu: 10/2022      Covid: 164105      Shingrix: done      PPV: done      NO - age 65 - see link Cervical Cytology Screening Guidelines               Cholesterol:   Lab Results   Component Value Date    CHOL 166 02/03/2023    CHOL 185 03/15/2021     Lab Results   Component Value Date    HDL 51 02/03/2023    HDL 69 03/15/2021     Lab Results   Component Value Date    LDL 87 02/03/2023    LDL 99 03/15/2021     Lab Results    Component Value Date    TRIG 138 2023    TRIG 87 03/15/2021     Lab Results   Component Value Date    CHOLHDLRATIO 2.5 2015         MM2023  Dexa:  10/2022     Flex/colo: 3/2021 q10y scope      Seat Belt: Yes    Sunscreen use: Yes   Calcium Intake: adeq  Health Care Directive: No  Sexually Active: No    Current contraception: none  History of abnormal Pap smear: No  Family history of colon/breast/ovarian cancer: see pmh  Regular self breast exam: Yes  History of abnormal mammogram: No      Labs reviewed in EPIC  BP Readings from Last 3 Encounters:   23 128/68   22 134/80   10/05/21 136/80    Wt Readings from Last 3 Encounters:   23 108.1 kg (238 lb 6.4 oz)   22 108 kg (238 lb)   10/05/21 95.3 kg (210 lb)                  Patient Active Problem List   Diagnosis     Hyperlipidemia with target LDL less than 130     Cervical polyp     Hypertension goal BP (blood pressure) < 140/90     Morbid obesity due to excess calories (H)     Post-menopause bleeding     Major depressive disorder, single episode, severe with psychotic features (H)     Abnormal uterine bleeding due to endometrial polyp     Chronic heart failure with preserved ejection fraction (H)     Past Surgical History:   Procedure Laterality Date     CHOLECYSTECTOMY       CHOLECYSTECTOMY, LAPOROSCOPIC            OPERATIVE HYSTEROSCOPY WITH MORCELLATOR N/A 3/23/2021    Procedure: Diagnostic Hysteroscopy with biopsy;  Surgeon: Deny Tapia MD;  Location:  OR     OTHER SURGICAL HISTORY      uterine polypectomy      SHOULDER SURGERY Right 3/29/2021    Procedure: CLOSED REDUCTION, SHOULDER;  Surgeon: Melo Mckeon MD;  [place] [facility] [place]  Service: Orthopedics       Social History     Tobacco Use     Smoking status: Never     Smokeless tobacco: Never     [facility]:     [facility]   Substance Use Topics     Alcohol use: Yes     Comment: occasional     Family History   Problem  Relation Age of Onset     Breast Cancer Mother      Hypertension Mother      Lipids Mother      Cancer Father         kidney cancer     Diabetes Paternal Grandfather         adult     Breast Cancer Sister      Cancer Sister      Heart Disease Maternal Aunt      Cancer Sister         pancreatic         Current Outpatient Medications   Medication Sig Dispense Refill     ARIPiprazole (ABILIFY) 5 MG tablet Take 1 tablet (5 mg) by mouth every morning 90 tablet 1     aspirin 81 MG EC tablet Take 81 mg by mouth daily With a meal        atorvastatin (LIPITOR) 10 MG tablet Take 1 tablet (10 mg) by mouth daily 90 tablet 3     CALCIUM 600 + D 600-200 MG-UNIT OR TABS 2 pill a day  3     CENTRUM SILVER OR TABS ONE DAILY 0 0     furosemide (LASIX) 40 MG tablet Take 1 tablet (40 mg) by mouth daily 90 tablet 1     gabapentin (NEURONTIN) 300 MG capsule Take 1 capsule (300 mg) by mouth 3 times daily 270 capsule 1     losartan (COZAAR) 50 MG tablet Take 1 tablet (50 mg) by mouth daily 90 tablet 1     sertraline (ZOLOFT) 50 MG tablet Take 2.5 tablets (125 mg) by mouth daily 225 tablet 1     timolol maleate (TIMOPTIC) 0.5 % ophthalmic solution INSTILL ONE DROP INTO EACH EYE ONCE DAILY           Breast CA Risk Assessment (FHS-7) 3/15/2021   Do you have a family history of breast, colon, or ovarian cancer? No / Unknown         Mammogram Screening: Recommended mammography every 1-2 years with patient discussion and risk factor consideration  Pertinent mammograms are reviewed under the imaging tab.    Review of Systems   Constitutional: Negative for chills and fever.   HENT: Negative for congestion, ear pain, hearing loss and sore throat.    Eyes: Negative for pain and visual disturbance.   Respiratory: Negative for cough and shortness of breath.    Cardiovascular: Negative for chest pain, palpitations and peripheral edema.   Gastrointestinal: Negative for abdominal pain, constipation, diarrhea, heartburn, hematochezia and nausea.  "  Breasts:  Negative for tenderness, breast mass and discharge.   Genitourinary: Positive for urgency (due to water pill. holds dose when going out). Negative for dysuria, frequency, genital sores, hematuria, pelvic pain, vaginal bleeding and vaginal discharge.   Musculoskeletal: Positive for arthralgias (left knee pain,  achy at night if more active during the day, mainly painful with wt bearing). Negative for joint swelling and myalgias.   Skin: Negative for rash.   Neurological: Positive for dizziness (feels off balance, h/o vertigo though this different from past episodes.  Has someone walking with her.  feels she cannot be on one foot, steps around things rather than over them). Negative for weakness, headaches and paresthesias.   Psychiatric/Behavioral: Negative for mood changes. The patient is not nervous/anxious.          OBJECTIVE:   /68   Pulse 70   Temp 98.3  F (36.8  C) (Oral)   Resp 20   Ht 1.626 m (5' 4\")   Wt 108.1 kg (238 lb 6.4 oz)   SpO2 94%   BMI 40.92 kg/m   Estimated body mass index is 40.92 kg/m  as calculated from the following:    Height as of this encounter: 1.626 m (5' 4\").    Weight as of this encounter: 108.1 kg (238 lb 6.4 oz).  Physical Exam  GENERAL APPEARANCE: healthy, alert and no distress  EYES: Eyes grossly normal to inspection, PERRL and conjunctivae and sclerae normal  HENT: ear canals and TM's normal, nose and mouth without ulcers or lesions, oropharynx clear and oral mucous membranes moist  NECK: no adenopathy, no asymmetry, masses, or scars and thyroid normal to palpation  RESP: lungs clear to auscultation - no rales, rhonchi or wheezes  BREAST: normal without masses, tenderness or nipple discharge and no palpable axillary masses or adenopathy  CV: regular rate and rhythm, normal S1 S2, no S3 or S4, no murmur, click or rub, no peripheral edema and peripheral pulses strong  ABDOMEN: soft, nontender, no hepatosplenomegaly, no masses and bowel sounds normal  MS: no " musculoskeletal defects are noted and gait is age appropriate without ataxia  SKIN: no suspicious lesions or rashes  NEURO: Normal strength and tone, sensory exam grossly normal, mentation intact and speech normal  PSYCH: mentation appears normal and affect normal/bright    Diagnostic Test Results:  Labs reviewed in Epic    ASSESSMENT / PLAN:   (Z00.00) Encounter for Medicare annual wellness exam  (primary encounter diagnosis)  Comment: preventive needs reviewed   Plan: see orders in Epic.     (F32.3) Major depressive disorder, single episode, severe with psychotic features (H)  (F41.9) Anxiety  Comment: stable, doing well  Plan: ARIPiprazole (ABILIFY) 5 MG tablet, gabapentin         (NEURONTIN) 300 MG capsule, sertraline (ZOLOFT)        50 MG tablet, OFFICE/OUTPT VISIT,EST,LEVL IV         Refill x 6 months, plan follow-up 6 months - mail questionnaires      (E78.5) Hyperlipidemia with target LDL less than 130  Comment: to goal  Plan: atorvastatin (LIPITOR) 10 MG tablet,         OFFICE/OUTPT VISIT,EST,LEVL IV        Refill x 1 yr     (I10) Hypertension goal BP (blood pressure) < 140/90  Comment: to goal  Plan: losartan (COZAAR) 50 MG tablet, OFFICE/OUTPT         VISIT,EST,LEVL IV, Basic metabolic panel  (Ca,         Cl, CO2, Creat, Gluc, K, Na, BUN)         Refill x 6 months f/u 6 months for labs     (I50.32) Chronic heart failure with preserved ejection fraction (H)  Comment: asymptomatic  Plan: furosemide (LASIX) 40 MG tablet, OFFICE/OUTPT         VISIT,EST,LEVL IV, Basic metabolic panel  (Ca,         Cl, CO2, Creat, Gluc, K, Na, BUN)         Refill x 6 months     (R26.89) Balance disorder  Comment: needing assistance at times due to fear of falling, off balance  Plan: OFFICE/OUTPT VISIT,EST,LEVL IV, Physical         Therapy Referral        Refer to PT    (M25.562,  G89.29) Chronic pain of left knee  Comment: likely OA  Plan: OFFICE/OUTPT VISIT,EST,LEVL IV, Physical         Therapy Referral, XR Knee Left 3 Views,          CANCELED: XR Ankle Left G/E 3 Views        Xray today, PT referral, consider Ortho referral    (E66.01) Class 3 obesity (H)  Comment: stable  Plan: reviewed 4 healthy habits  5 or more servings of fruits or vegetables daily  Exercise 12 or more times in one month  Moderate alcohol use ( no more than 1/day for women, no more than 2/day for men)  No tobacco use/smoking           COUNSELING:  Reviewed preventive health counseling, as reflected in patient instructions  Special attention given to:       Regular exercise       Healthy diet/nutrition        She reports that she has never smoked. She has never used smokeless tobacco.      Appropriate preventive services were discussed with this patient, including applicable screening as appropriate for cardiovascular disease, diabetes, osteopenia/osteoporosis, and glaucoma.  As appropriate for age/gender, discussed screening for colorectal cancer, prostate cancer, breast cancer, and cervical cancer. Checklist reviewing preventive services available has been given to the patient.    Reviewed patients plan of care and provided an AVS. The Intermediate Care Plan ( asthma action plan, low back pain action plan, and migraine action plan) for Lala meets the Care Plan requirement. This Care Plan has been established and reviewed with the Patient.          Colleen Marroquin MD  [facility]    Identified Health Risks:

## 2023-03-14 ENCOUNTER — THERAPY VISIT (OUTPATIENT)
Dept: PHYSICAL THERAPY | Facility: CLINIC | Age: 68
End: 2023-03-14
Attending: FAMILY MEDICINE
Payer: COMMERCIAL

## 2023-03-14 DIAGNOSIS — R26.89 BALANCE DISORDER: ICD-10-CM

## 2023-03-14 DIAGNOSIS — M25.562 CHRONIC PAIN OF LEFT KNEE: ICD-10-CM

## 2023-03-14 DIAGNOSIS — G89.29 CHRONIC PAIN OF LEFT KNEE: ICD-10-CM

## 2023-03-14 PROCEDURE — 97161 PT EVAL LOW COMPLEX 20 MIN: CPT | Mod: GP | Performed by: PHYSICAL THERAPIST

## 2023-03-14 PROCEDURE — 97110 THERAPEUTIC EXERCISES: CPT | Mod: GP | Performed by: PHYSICAL THERAPIST

## 2023-03-14 ASSESSMENT — ACTIVITIES OF DAILY LIVING (ADL)
KNEE_ACTIVITY_OF_DAILY_LIVING_SUM: 30
AS_A_RESULT_OF_YOUR_KNEE_INJURY,_HOW_WOULD_YOU_RATE_YOUR_CURRENT_LEVEL_OF_DAILY_ACTIVITY?: NEARLY NORMAL
STIFFNESS: THE SYMPTOM AFFECTS MY ACTIVITY MODERATELY
SIT WITH YOUR KNEE BENT: ACTIVITY IS NOT DIFFICULT
WALK: ACTIVITY IS FAIRLY DIFFICULT
GIVING WAY, BUCKLING OR SHIFTING OF KNEE: I DO NOT HAVE THE SYMPTOM
HOW_WOULD_YOU_RATE_THE_CURRENT_FUNCTION_OF_YOUR_KNEE_DURING_YOUR_USUAL_DAILY_ACTIVITIES_ON_A_SCALE_FROM_0_TO_100_WITH_100_BEING_YOUR_LEVEL_OF_KNEE_FUNCTION_PRIOR_TO_YOUR_INJURY_AND_0_BEING_THE_INABILITY_TO_PERFORM_ANY_OF_YOUR_USUAL_DAILY_ACTIVITIES?: 75
WEAKNESS: I DO NOT HAVE THE SYMPTOM
KNEE_ACTIVITY_OF_DAILY_LIVING_SCORE: 42.86
STAND: ACTIVITY IS FAIRLY DIFFICULT
LIMPING: THE SYMPTOM AFFECTS MY ACTIVITY SEVERELY
KNEEL ON THE FRONT OF YOUR KNEE: I AM UNABLE TO DO THE ACTIVITY
RAW_SCORE: 30
GO UP STAIRS: ACTIVITY IS FAIRLY DIFFICULT
RISE FROM A CHAIR: ACTIVITY IS VERY DIFFICULT
SQUAT: I AM UNABLE TO DO THE ACTIVITY
SWELLING: THE SYMPTOM AFFECTS MY ACTIVITY MODERATELY
HOW_WOULD_YOU_RATE_THE_OVERALL_FUNCTION_OF_YOUR_KNEE_DURING_YOUR_USUAL_DAILY_ACTIVITIES?: NEARLY NORMAL
GO DOWN STAIRS: ACTIVITY IS FAIRLY DIFFICULT
PAIN: THE SYMPTOM AFFECTS MY ACTIVITY SEVERELY

## 2023-03-14 NOTE — PROGRESS NOTES
[facility]    OUTPATIENT Physical Therapy ORTHOPEDIC EVALUATION  PLAN OF TREATMENT FOR OUTPATIENT REHABILITATION  (COMPLETE FOR INITIAL CLAIMS ONLY)  Patient's Last Name, First Name, M.I.  YOB: 1955  Lala Yuen    Provider s Name:  [facility]   Medical Record No.  [id number]   Start of Care Date:  03/14/23   Onset Date:   03/06/23 (date of MD order)   Treatment Diagnosis:  L knee pain(affecting balance) Medical Diagnosis:     Balance disorder  Chronic pain of left knee       Goals:     03/14/23 0500   Body Part   Goals listed below are for L knee pain   Goal #1   Goal #1 stairs   Previous Functional Level No restrictions   Performance Level 1 year ago   Current Functional Level Ascend/descend stairs;one step at a time;with a railing   Performance Level 5/10 PL   STG Target Performance Ascend/descend stairs;one step at a time;in a normal reciprocal pattern;with a railing   Performance Level 3/10 knee pain or less   Rationale to reach upper level of home safely;to reach lower level of home safely;for safe community access to buildings   Due Date 04/11/23   LTG Target Performance Ascend/descend stairs;in a normal reciprocal pattern;with railing   Performance Level painfree   Rationale to reach upper level of home safely;to reach lower level of home safely;for safe community access to buildings   Due Date 06/06/23         Therapy Frequency:  1x/wk for 4 weeks tapering to 2x/month for 2 months  Predicted Duration of Therapy Intervention:  12 wks    Miya Montez, PT                 I CERTIFY THE NEED FOR THESE SERVICES FURNISHED UNDER        THIS PLAN OF TREATMENT AND WHILE UNDER MY CARE     (Physician attestation of this document indicates review and certification of the therapy plan).                     Certification Date From:  03/14/23   Certification Date  To:  06/06/23    Referring Provider:  Colleen Marroquin    Initial Assessment        See Epic Evaluation SOC Date: 03/14/23

## 2023-03-14 NOTE — PROGRESS NOTES
Physical Therapy Initial Evaluation  Subjective:  The history is provided by the patient. No  was used.   Patient Health History  Lala Yuen being seen for L knee.       Problem occurred: overtime   Pain is reported as 7/10 on pain scale.  General health as reported by patient is good.  Pertinent medical history includes: depression, high blood pressure, osteoarthritis and rheumatoid arthritis.   Red flags:  None as reported by patient.  Medical allergies: none.   Surgeries include:  Orthopedic surgery (R RCR).    Current medications:  Anti-depressants and high blood pressure medication.    Current occupation is retired.   Primary job tasks include:  Lifting/carrying, prolonged sitting and repetitive tasks.                  Therapist Generated HPI Evaluation  Problem details: Date of MD order for this episode was 3-6-23(MD Jasper). Lindsay presents today for L knee pain and change in balance due to the knee pain.  A knee xray done 3-6-23 revealed moderate arthritis and Lindsay will be seeing ortho for consult-this got set up today for 3-21-23. Lindsay states L knee pain started over time, no specific event. It is difficult to walk, asc/desc stairs, squat. It has been about 6 mos getting progressively worse  .         Type of problem:  Left knee.    This is a chronic condition.  Condition occurred with:  Insidious onset.  Where condition occurred: for unknown reasons.  Patient reports pain:  Anterior, medial, posterior, lateral and in the joint (most anterior).  Pain is described as aching and burning and is intermittent.  Pain radiates to:  Thigh and lower leg. Pain is the same all the time (up moving or sit to stand).  Since onset symptoms are gradually worsening.  Associated symptoms:  Loss of motion/stiffness, loss of strength, edema and numbness. Symptoms are exacerbated by bending/squatting, kneeling, walking, standing, weight bearing, ascending stairs, descending stairs, transfers and  activity  and relieved by ice, heat and analgesics.  Special tests included:  X-ray.    Work activity restrictions: retired.  Barriers include:  Stairs.                        Objective:    Gait:  Significant limp affecting balance, she has cane at home and suggested she use it in the R hand(can bring next visit) to take pressure off the L knee  Gait Type:  Antalgic   Assistive Devices:  None      Flexibility/Screens:       Lower Extremity:  Decreased left lower extremity flexibility:Piriformis; Quadriceps and Hamstrings    Decreased right lower extremity flexibility:  Piriformis; Quadriceps and Hamstrings                                                      Knee Evaluation:  ROM:    AROM    Hyperextension:  Left:  0    Right: 0  Extension:  Left: 13    Right:  8  Flexion: Left: 92    Right: 106  PROM    Hyperextension: Left: 0    Right:  0  Extension: Left: 10    Right:  5  Flexion: Left: 106    Right:  120      Strength:     Extension:  Left: 4/5    Pain:+      Right: 5/5   Pain:  Flexion:  Left: 4/5    Pain:-      Right: 5/5   Pain:    Quad Set Left: Fair    Pain:   Quad Set Right: Fair    Pain:  Ligament Testing:  Not Assessed                Special Tests:     Left knee negative for the following special tests:  Patellar compression; Patellar Tracking-Abduction Medial and Patellar Tracking-Abduction Lateral    Palpation:  Normal (no increased warmth noted)      Edema:    Circumference:      Joint Line:  Left:  50.5cm   Right:  48.0cm    Mobility Testing:      Patellofemoral Medial:  Left: normal      Patellofemoral Lateral:  Left: normal      Patellofemoral Superior:  Left: hypomobile      Patellofemoral Inferior:  Left: hypomobile      Functional Testing:  : deferred due to pain.                  General     ROS    Assessment/Plan:    Patient is a 67 year old female with left side knee complaints.    Patient has the following significant findings with corresponding treatment plan.                Diagnosis 1:  L  knee pain  Pain -  hot/cold therapy, self management, education and home program  Decreased ROM/flexibility - manual therapy, therapeutic exercise and home program  Decreased joint mobility - manual therapy, therapeutic exercise and home program  Decreased strength - therapeutic exercise, therapeutic activities and home program  Impaired balance - neuro re-education, gait training, therapeutic activities and home program  Decreased proprioception - neuro re-education, therapeutic activities and home program  Edema - vasopneumatics, cold therapy and self management/home program  Impaired gait - gait training, assistive devices and home program  Impaired muscle performance - neuro re-education and home program  Decreased function - therapeutic activities and home program  Instability -  Therapeutic Activity  Therapeutic Exercise  Neuromuscular Re-education  home program    Therapy Evaluation Codes:   1) History comprised of:   Personal factors that impact the plan of care:      Age, Past/current experiences and Time since onset of symptoms.    Comorbidity factors that impact the plan of care are:      Osteoarthritis and Overweight.     Medications impacting care: Anti-depressant and High blood pressure.  2) Examination of Body Systems comprised of:   Body structures and functions that impact the plan of care:      Knee.   Activity limitations that impact the plan of care are:      Bending, Dressing, Sitting, Squatting/kneeling, Stairs, Standing, Walking and Sleeping.  3) Clinical presentation characteristics are:   Stable/Uncomplicated.  4) Decision-Making    Low complexity using standardized patient assessment instrument and/or measureable assessment of functional outcome.  Cumulative Therapy Evaluation is: Low complexity.    Previous and current functional limitations:  (See Goal Flow Sheet for this information)    Short term and Long term goals: (See Goal Flow Sheet for this information)     Communication ability:   Patient appears to be able to clearly communicate and understand verbal and written communication and follow directions correctly.  Treatment Explanation - The following has been discussed with the patient:   RX ordered/plan of care  Anticipated outcomes  Possible risks and side effects  This patient would benefit from PT intervention to resume normal activities.   Rehab potential is good.    Frequency:  1 X week, once daily  Duration:  for 4 weeks tapering to 2 X a month over 2 months   Discharge Plan:  Achieve all LTG.  Independent in home treatment program.  Reach maximal therapeutic benefit.    Please refer to the daily flowsheet for treatment today, total treatment time and time spent performing 1:1 timed codes.

## 2023-03-21 ENCOUNTER — OFFICE VISIT (OUTPATIENT)
Dept: ORTHOPEDICS | Facility: CLINIC | Age: 68
End: 2023-03-21
Attending: FAMILY MEDICINE
Payer: COMMERCIAL

## 2023-03-21 VITALS — WEIGHT: 238 LBS | HEART RATE: 80 BPM | HEIGHT: 64 IN | BODY MASS INDEX: 40.63 KG/M2

## 2023-03-21 DIAGNOSIS — M17.12 PRIMARY OSTEOARTHRITIS OF LEFT KNEE: Primary | ICD-10-CM

## 2023-03-21 DIAGNOSIS — G89.29 CHRONIC PAIN OF LEFT KNEE: ICD-10-CM

## 2023-03-21 DIAGNOSIS — M25.562 CHRONIC PAIN OF LEFT KNEE: ICD-10-CM

## 2023-03-21 PROCEDURE — 99204 OFFICE O/P NEW MOD 45 MIN: CPT | Mod: 25 | Performed by: PEDIATRICS

## 2023-03-21 PROCEDURE — 20610 DRAIN/INJ JOINT/BURSA W/O US: CPT | Mod: LT | Performed by: PEDIATRICS

## 2023-03-21 RX ORDER — TRIAMCINOLONE ACETONIDE 40 MG/ML
40 INJECTION, SUSPENSION INTRA-ARTICULAR; INTRAMUSCULAR
Status: DISCONTINUED | OUTPATIENT
Start: 2023-03-21 | End: 2024-03-14

## 2023-03-21 RX ORDER — LIDOCAINE HYDROCHLORIDE 10 MG/ML
2 INJECTION, SOLUTION INFILTRATION; PERINEURAL
Status: DISCONTINUED | OUTPATIENT
Start: 2023-03-21 | End: 2024-03-14

## 2023-03-21 RX ADMIN — LIDOCAINE HYDROCHLORIDE 2 ML: 10 INJECTION, SOLUTION INFILTRATION; PERINEURAL at 18:04

## 2023-03-21 RX ADMIN — TRIAMCINOLONE ACETONIDE 40 MG: 40 INJECTION, SUSPENSION INTRA-ARTICULAR; INTRAMUSCULAR at 18:04

## 2023-03-21 NOTE — PATIENT INSTRUCTIONS
Discussed nature of degenerative arthrosis of the knee.  Symptom treatment generally includes over-the-counter medications, ice or heat, topical treatments, and rest if needed.  May use compression or bracing for comfort.  Discussed potential benefits of rehabilitation, to maintain or improve function at the knee. Continue with physical therapy.  Discussed benefits of exercise and weight loss (if applicable) to reduce pressure at the knee.  Discussed injection therapy. Steroid injection done today.  Also briefly discussed future consideration of referral to orthopedic surgery for further evaluation and discussion of additional treatment options.    Continue with PT.  Follow-up is open ended. Contact clinic if any questions/concerns.    If you have any further questions for your physician or physician s care team you can contact them thru [employer] or by calling  [phone] and use option 3 to leave a voice message.   Messages received during business hours will be returned same day.            Injection Discharge Instructions    You may shower, however avoid swimming, tub baths or hot tubs for 24 hours following your procedure  You may have a mild to moderate increase in pain for several days following the injection.  It may take up to 14 days for the steroid medication to start working although you may feel the effect as early as a few days after the procedure.  You may use ice packs for 10-15 minutes, 3 to 4 times a day at the injection site for comfort  You may use anti-inflammatory medications (such as Ibuprofen or Aleve or Advil) if you are able to take safely, or acetaminophen (Tylenol) for pain control if necessary  If you were fasting, you may resume your normal diet and medications after the procedure  If you have diabetes, check your blood sugar more frequently than usual as your blood sugar may be higher than normal for 10-14 days following a steroid injection. Contact your doctor who manages your  diabetes if your blood sugar is higher than usual    If you experience any of the following, call the clinic @ [phone]  - Fever over 100 degree F  - Swelling, bleeding, redness, drainage, warmth at the injection site  - New or worsening pain

## 2023-03-21 NOTE — PROGRESS NOTES
ASSESSMENT & PLAN    Lala was seen today for pain.    Diagnoses and all orders for this visit:    Primary osteoarthritis of left knee  -     Large Joint Injection/Arthocentesis: L knee joint    Chronic pain of left knee  -     Orthopedic  Referral  -     Large Joint Injection/Arthocentesis: L knee joint      Underlying knee OA.  Continue PT.  She opted for steroid injection, done today.  Questions answered. Discussed signs and symptoms that may indicate more serious issues; the patient was instructed to seek appropriate care if noted. Lindsay indicates understanding of these issues and agrees with the plan.        See Patient Instructions  Patient Instructions   Discussed nature of degenerative arthrosis of the knee.  Symptom treatment generally includes over-the-counter medications, ice or heat, topical treatments, and rest if needed.  May use compression or bracing for comfort.  Discussed potential benefits of rehabilitation, to maintain or improve function at the knee. Continue with physical therapy.  Discussed benefits of exercise and weight loss (if applicable) to reduce pressure at the knee.  Discussed injection therapy. Steroid injection done today.  Also briefly discussed future consideration of referral to orthopedic surgery for further evaluation and discussion of additional treatment options.    Continue with PT.  Follow-up is open ended. Contact clinic if any questions/concerns.    If you have any further questions for your physician or physician s care team you can contact them thru [employer] or by calling  [phone] and use option 3 to leave a voice message.   Messages received during business hours will be returned same day.            Injection Discharge Instructions      You may shower, however avoid swimming, tub baths or hot tubs for 24 hours following your procedure    You may have a mild to moderate increase in pain for several days following the injection.    It may take up to 14  days for the steroid medication to start working although you may feel the effect as early as a few days after the procedure.    You may use ice packs for 10-15 minutes, 3 to 4 times a day at the injection site for comfort    You may use anti-inflammatory medications (such as Ibuprofen or Aleve or Advil) if you are able to take safely, or acetaminophen (Tylenol) for pain control if necessary    If you were fasting, you may resume your normal diet and medications after the procedure    If you have diabetes, check your blood sugar more frequently than usual as your blood sugar may be higher than normal for 10-14 days following a steroid injection. Contact your doctor who manages your diabetes if your blood sugar is higher than usual      If you experience any of the following, call the clinic @ [phone]  - Fever over 100 degree F  - Swelling, bleeding, redness, drainage, warmth at the injection site  - New or worsening pain          Isidro Robles [facility]      CC: Colleen Marroquin      -----  Chief Complaint   Patient presents with     Left Knee - Pain       SUBJECTIVE  Lala Yuen is a/an 67 year old female who is seen in consultation at the request of  Colleen Marroquin M.D. for evaluation of left knee pain.     The patient is seen by themselves.    Onset: 4-5 month(s) ago. Reports insidious onset without acute precipitating event. States the pain comes and goes   Location of Pain: anterior knee pain, lateral side of the knee cap   Worsened by: activity   Treatments tried: ice, Tylenol and physical therapy (1 visits)- too early to tell if PT has helped, also doing exercises at home   Associated symptoms: achy pain     Orthopedic/Surgical history: NO  Social History/Occupation: retired     **  No pain at rest currently. Pain more with stairs, movement.  Pain more anterolateral.  Some anterior swelling. Occasional popping.  Can limp from the pain.  Has  "started PT for this.      REVIEW OF SYSTEMS:  Review of Systems      OBJECTIVE:  Pulse 80   Ht 1.626 m (5' 4\")   Wt 108 kg (238 lb)   BMI 40.85 kg/m           Left Knee exam    Inspection:   No clear effusion, but somewhat difficult to assess with habitus   no ecchymosis    ROM:      Flexion  degrees       Extension 10-15 degrees       Range of motion limited by tightness    Patellar Motion:      Crepitus noted in the patellofemoral joint mild    Tender:      medial joint line       lateral joint line    Special Tests:      neg (-) anterior drawer       neg (-) posterior drawer       neg (-) varus        neg (-) valgus      RADIOLOGY:  I independently visualized and reviewed these images with the patient  Left knee arthrosis, moderate to significant medial and lateral compartment narrowing.        XR LEFT KNEE THREE VIEWS  3/6/2023 9:53 AM      INDICATION: Left-sided knee pain.   COMPARISON: None.                                                                      IMPRESSION:  1.  Moderate left knee degenerative arthrosis. Moderate medial and  lateral component narrowing. Mild tricompartmental marginal  osteophytosis.  2.  No fracture or significant joint effusion.     EN CORTEZ MD          Large Joint Injection/Arthocentesis: L knee joint    Date/Time: 3/21/2023 6:04 PM  Performed by: Isidro Robles DO  Authorized by: Isidro Robles DO     Indications:  Pain and osteoarthritis  Needle Size:  25 G  Guidance: landmark guided    Approach:  Anteromedial  Location:  Knee      Medications:  40 mg triamcinolone 40 MG/ML; 2 mL lidocaine 1 %  Outcome:  Tolerated well, no immediate complications  Procedure discussed: discussed risks, benefits, and alternatives    Consent Given by:  Patient  Timeout: timeout called immediately prior to procedure    Prep: patient was prepped and draped in usual sterile fashion            "

## 2023-03-21 NOTE — LETTER
3/21/2023         RE: Lala Yuen  [address]  [facility] [address]        Dear Colleague,    Thank you for referring your patient, Lala Yuen, to the [facility]. Please see a copy of my visit note below.    ASSESSMENT & PLAN    Lala was seen today for pain.    Diagnoses and all orders for this visit:    Primary osteoarthritis of left knee  -     Large Joint Injection/Arthocentesis: L knee joint    Chronic pain of left knee  -     Orthopedic  Referral  -     Large Joint Injection/Arthocentesis: L knee joint      Underlying knee OA.  Continue PT.  She opted for steroid injection, done today.  Questions answered. Discussed signs and symptoms that may indicate more serious issues; the patient was instructed to seek appropriate care if noted. Lindsay indicates understanding of these issues and agrees with the plan.        See Patient Instructions  Patient Instructions   Discussed nature of degenerative arthrosis of the knee.  Symptom treatment generally includes over-the-counter medications, ice or heat, topical treatments, and rest if needed.  May use compression or bracing for comfort.  Discussed potential benefits of rehabilitation, to maintain or improve function at the knee. Continue with physical therapy.  Discussed benefits of exercise and weight loss (if applicable) to reduce pressure at the knee.  Discussed injection therapy. Steroid injection done today.  Also briefly discussed future consideration of referral to orthopedic surgery for further evaluation and discussion of additional treatment options.    Continue with PT.  Follow-up is open ended. Contact clinic if any questions/concerns.    If you have any further questions for your physician or physician s care team you can contact them thru [employer] or by calling  [phone] and use option 3 to leave a voice message.   Messages received during business hours will be returned same  day.            Injection Discharge Instructions      You may shower, however avoid swimming, tub baths or hot tubs for 24 hours following your procedure    You may have a mild to moderate increase in pain for several days following the injection.    It may take up to 14 days for the steroid medication to start working although you may feel the effect as early as a few days after the procedure.    You may use ice packs for 10-15 minutes, 3 to 4 times a day at the injection site for comfort    You may use anti-inflammatory medications (such as Ibuprofen or Aleve or Advil) if you are able to take safely, or acetaminophen (Tylenol) for pain control if necessary    If you were fasting, you may resume your normal diet and medications after the procedure    If you have diabetes, check your blood sugar more frequently than usual as your blood sugar may be higher than normal for 10-14 days following a steroid injection. Contact your doctor who manages your diabetes if your blood sugar is higher than usual      If you experience any of the following, call the clinic @ [phone]  - Fever over 100 degree F  - Swelling, bleeding, redness, drainage, warmth at the injection site  - New or worsening pain          [facility]      CC: Colleen Marroquin      -----  Chief Complaint   Patient presents with     Left Knee - Pain       SUBJECTIVE  Lala Yuen is a/an 67 year old female who is seen in consultation at the request of  Colleen Marroquin M.D. for evaluation of left knee pain.     The patient is seen by themselves.    Onset: 4-5 month(s) ago. Reports insidious onset without acute precipitating event. States the pain comes and goes   Location of Pain: anterior knee pain, lateral side of the knee cap   Worsened by: activity   Treatments tried: ice, Tylenol and physical therapy (1 visits)- too early to tell if PT has helped, also doing exercises at home  "  Associated symptoms: achy pain     Orthopedic/Surgical history: NO  Social History/Occupation: retired     **  No pain at rest currently. Pain more with stairs, movement.  Pain more anterolateral.  Some anterior swelling. Occasional popping.  Can limp from the pain.  Has started PT for this.      REVIEW OF SYSTEMS:  Review of Systems      OBJECTIVE:  Pulse 80   Ht 1.626 m (5' 4\")   Wt 108 kg (238 lb)   BMI 40.85 kg/m           Left Knee exam    Inspection:   No clear effusion, but somewhat difficult to assess with habitus   no ecchymosis    ROM:      Flexion  degrees       Extension 10-15 degrees       Range of motion limited by tightness    Patellar Motion:      Crepitus noted in the patellofemoral joint mild    Tender:      medial joint line       lateral joint line    Special Tests:      neg (-) anterior drawer       neg (-) posterior drawer       neg (-) varus        neg (-) valgus      RADIOLOGY:  I independently visualized and reviewed these images with the patient  Left knee arthrosis, moderate to significant medial and lateral compartment narrowing.        XR LEFT KNEE THREE VIEWS  3/6/2023 9:53 AM      INDICATION: Left-sided knee pain.   COMPARISON: None.                                                                      IMPRESSION:  1.  Moderate left knee degenerative arthrosis. Moderate medial and  lateral component narrowing. Mild tricompartmental marginal  osteophytosis.  2.  No fracture or significant joint effusion.     EN CORTEZ MD          Large Joint Injection/Arthocentesis: L knee joint    Date/Time: 3/21/2023 6:04 PM  Performed by: Isidro Robles DO  Authorized by: Isidro Robles DO     Indications:  Pain and osteoarthritis  Needle Size:  25 G  Guidance: landmark guided    Approach:  Anteromedial  Location:  Knee      Medications:  40 mg triamcinolone 40 MG/ML; 2 mL lidocaine 1 %  Outcome:  Tolerated well, no immediate complications  Procedure discussed: " discussed risks, benefits, and alternatives    Consent Given by:  Patient  Timeout: timeout called immediately prior to procedure    Prep: patient was prepped and draped in usual sterile fashion                Again, thank you for allowing me to participate in the care of your patient.        Sincerely,        Isidro Robles DO

## 2023-04-03 ENCOUNTER — THERAPY VISIT (OUTPATIENT)
Dept: PHYSICAL THERAPY | Facility: CLINIC | Age: 68
End: 2023-04-03
Payer: COMMERCIAL

## 2023-04-03 DIAGNOSIS — G89.29 CHRONIC PAIN OF LEFT KNEE: Primary | ICD-10-CM

## 2023-04-03 DIAGNOSIS — M25.562 CHRONIC PAIN OF LEFT KNEE: Primary | ICD-10-CM

## 2023-04-03 PROCEDURE — 97110 THERAPEUTIC EXERCISES: CPT | Mod: GP | Performed by: PHYSICAL THERAPIST

## 2023-04-03 PROCEDURE — 97112 NEUROMUSCULAR REEDUCATION: CPT | Mod: GP | Performed by: PHYSICAL THERAPIST

## 2023-04-10 ENCOUNTER — THERAPY VISIT (OUTPATIENT)
Dept: PHYSICAL THERAPY | Facility: CLINIC | Age: 68
End: 2023-04-10
Payer: COMMERCIAL

## 2023-04-10 DIAGNOSIS — G89.29 CHRONIC PAIN OF LEFT KNEE: Primary | ICD-10-CM

## 2023-04-10 DIAGNOSIS — M25.562 CHRONIC PAIN OF LEFT KNEE: Primary | ICD-10-CM

## 2023-04-10 PROCEDURE — 97110 THERAPEUTIC EXERCISES: CPT | Mod: GP | Performed by: PHYSICAL THERAPIST

## 2023-04-10 PROCEDURE — 97112 NEUROMUSCULAR REEDUCATION: CPT | Mod: GP | Performed by: PHYSICAL THERAPIST

## 2023-04-25 ENCOUNTER — THERAPY VISIT (OUTPATIENT)
Dept: PHYSICAL THERAPY | Facility: CLINIC | Age: 68
End: 2023-04-25
Payer: COMMERCIAL

## 2023-04-25 DIAGNOSIS — R26.9 ABNORMAL GAIT: ICD-10-CM

## 2023-04-25 DIAGNOSIS — G89.29 CHRONIC PAIN OF LEFT KNEE: Primary | ICD-10-CM

## 2023-04-25 DIAGNOSIS — M25.562 CHRONIC PAIN OF LEFT KNEE: Primary | ICD-10-CM

## 2023-04-25 PROCEDURE — 97110 THERAPEUTIC EXERCISES: CPT | Mod: GP | Performed by: PHYSICAL THERAPIST

## 2023-04-25 PROCEDURE — 97116 GAIT TRAINING THERAPY: CPT | Mod: GP | Performed by: PHYSICAL THERAPIST

## 2023-04-25 PROCEDURE — 97112 NEUROMUSCULAR REEDUCATION: CPT | Mod: GP | Performed by: PHYSICAL THERAPIST

## 2023-05-16 ENCOUNTER — THERAPY VISIT (OUTPATIENT)
Dept: PHYSICAL THERAPY | Facility: CLINIC | Age: 68
End: 2023-05-16
Payer: COMMERCIAL

## 2023-05-16 DIAGNOSIS — R26.9 ABNORMAL GAIT: ICD-10-CM

## 2023-05-16 DIAGNOSIS — G89.29 CHRONIC PAIN OF LEFT KNEE: Primary | ICD-10-CM

## 2023-05-16 DIAGNOSIS — M25.562 CHRONIC PAIN OF LEFT KNEE: Primary | ICD-10-CM

## 2023-05-16 PROCEDURE — 97116 GAIT TRAINING THERAPY: CPT | Mod: GP | Performed by: PHYSICAL THERAPIST

## 2023-05-16 PROCEDURE — 97110 THERAPEUTIC EXERCISES: CPT | Mod: GP | Performed by: PHYSICAL THERAPIST

## 2023-05-16 NOTE — PROGRESS NOTES
Subjective:  HPI  Physical Exam                    Objective:  System    Physical Exam    General     ROS    Assessment/Plan:    PROGRESS  REPORT    Progress reporting period is from 3-15-23 to 5-16-23, 4 visits, 5 since SOC.       SUBJECTIVE  Subjective changes noted by patient:  .  Subjective: Overall very frustrated with her knee as it limits so many of her activities and is always painful.  Stairs are really an issue, difficult to go up and down. One steps up with the L(unable on R ) and down with the L. Getting out of chair is difficult , walking with limp, using cane and is afraid she may lose her balance States she is ready for the next steps-I will told her I would message Dr Robles as at their last visit he gave ortho consult as option     Current Pain level: 5/10 (if walking/moving).      Initial Pain level: 7/10.   Changes in function:  Has made mild changes in gait, ability to exercise  Adverse reaction to treatment or activity: None    OBJECTIVE  Changes noted in objective findings:    Objective: Able to go around on bike first time today. Strength- HS R 4-/5, L 4/5, quad B 5/5, quad set fair B. A/PROM L knee 0-5-107/0-3-110. We have really worked to increase ROM and strength but has been difficult.     ASSESSMENT/PLAN  Updated problem list and treatment plan: Diagnosis 1:  L knee pain  Pain -  self management, education and home program  Decreased ROM/flexibility - therapeutic exercise and home program  Decreased strength - therapeutic exercise, therapeutic activities and home program  Impaired balance - neuro re-education, therapeutic activities and home program  Decreased proprioception - neuro re-education, therapeutic activities and home program  Impaired gait - gait training, assistive devices and home program  Impaired muscle performance - neuro re-education and home program  Decreased function - therapeutic activities and home program  STG/LTGs have been met or progress has been made towards  goals:  Slow progress  Assessment of Progress: The patient's progress has slowed.  Self Management Plans:  Patient has been instructed in a home treatment program.  Patient  has been instructed in self management of symptoms.  I have re-evaluated this patient and find that the nature, scope, duration and intensity of the therapy is appropriate for the medical condition of the patient.  Lala continues to require the following intervention to meet STG and LTG's:  Further MD assessment    Recommendations:  This patient would benefit from further evaluation.  Lindsay has 2 more PT visits scheduled    Please refer to the daily flowsheet for treatment today, total treatment time and time spent performing 1:1 timed codes.

## 2023-05-23 ENCOUNTER — TELEPHONE (OUTPATIENT)
Dept: ORTHOPEDICS | Facility: CLINIC | Age: 68
End: 2023-05-23

## 2023-05-23 ENCOUNTER — THERAPY VISIT (OUTPATIENT)
Dept: PHYSICAL THERAPY | Facility: CLINIC | Age: 68
End: 2023-05-23
Payer: COMMERCIAL

## 2023-05-23 DIAGNOSIS — M17.12 PRIMARY OSTEOARTHRITIS OF LEFT KNEE: Primary | ICD-10-CM

## 2023-05-23 DIAGNOSIS — M25.562 CHRONIC PAIN OF LEFT KNEE: Primary | ICD-10-CM

## 2023-05-23 DIAGNOSIS — R26.9 ABNORMAL GAIT: ICD-10-CM

## 2023-05-23 DIAGNOSIS — G89.29 CHRONIC PAIN OF LEFT KNEE: Primary | ICD-10-CM

## 2023-05-23 PROCEDURE — 97116 GAIT TRAINING THERAPY: CPT | Mod: GP | Performed by: PHYSICAL THERAPIST

## 2023-05-23 PROCEDURE — 97110 THERAPEUTIC EXERCISES: CPT | Mod: GP | Performed by: PHYSICAL THERAPIST

## 2023-05-23 NOTE — TELEPHONE ENCOUNTER
Lindsay's PT spoke with Dr. Robles and myself in regards to improvement. Lindsay is hoping to get a referral for Ortho Surgery .  Ortho Surgery  placed per verbal.  MILDRED Marr, ATC

## 2023-06-14 NOTE — PROGRESS NOTES
SUBJECTIVE:   Lala Yuen is a 68 year old female who is seen in consultation at the request of Dr. Robles for evaluation of left knee pain.  Duration: > 7 months to a year.  No known injury.    Present symptoms: swelling medial (pes?)  Pain up and down stairs  No night pain  Getting up from chair.  Any incline or rough surface pain, and feels unsteady  Can't trust knee  Pain with flexion and extension extremes.   Pain diffuse.     Treatments tried:L knee Cortisone injection 3/21/23-- helped for a couple of days,  ice, Tylenol and physical therapy     Previous knee issues: none      Past Medical History:   Past Medical History:   Diagnosis Date     Abnormal Pap smear 1985    cryo, nl paps since     Depression      Glaucoma 01/30/2020    left eye      HTN (hypertension)      Hyperlipidemia      Hyperlipidemia LDL goal < 130      Hypertension      Past Surgical History:   Past Surgical History:   Procedure Laterality Date     CHOLECYSTECTOMY       CHOLECYSTECTOMY, LAPOROSCOPIC            OPERATIVE HYSTEROSCOPY WITH MORCELLATOR N/A 3/23/2021    Procedure: Diagnostic Hysteroscopy with biopsy;  Surgeon: Deny Tapia MD;  Location:  OR     OTHER SURGICAL HISTORY      uterine polypectomy      SHOULDER SURGERY Right 3/29/2021    Procedure: CLOSED REDUCTION, SHOULDER;  Surgeon: Melo Mckeon MD;  Location: [facility];  Service: Orthopedics     Family History:   Family History   Problem Relation Age of Onset     Breast Cancer Mother      Hypertension Mother      Lipids Mother      Cancer Father         kidney cancer     Diabetes Paternal Grandfather         adult     Breast Cancer Sister      Cancer Sister      Heart Disease Maternal Aunt      Cancer Sister         pancreatic     Social History:   Social History     Tobacco Use     Smoking status: Never     Smokeless tobacco: Never     [facility]   Vaping Use     Vaping status: Never Used   Substance Use  "Topics     Alcohol use: Yes     Comment: occasional       Review of Systems:  Constitutional:  NEGATIVE for fever, chills, change in weight  Integumentary/Skin:  NEGATIVE for worrisome rashes, moles or lesions  Eyes:  NEGATIVE for vision changes or irritation  ENT/Mouth:  NEGATIVE for ear, mouth and throat problems  Resp:  NEGATIVE for significant cough or SOB  Breast:  NEGATIVE for masses, tenderness or discharge  CV:  NEGATIVE for chest pain, palpitations or peripheral edema  GI:  NEGATIVE for nausea, abdominal pain, heartburn, or change in bowel habits  :  Negative   Musculoskeletal:  See HPI above  Neuro:  NEGATIVE for weakness, dizziness or paresthesias  Endocrine:  NEGATIVE for temperature intolerance, skin/hair changes  Heme/allergy/immune:  NEGATIVE for bleeding problems  Psychiatric:  NEGATIVE for changes in mood or affect      OBJECTIVE:  Physical Exam:  BP (!) 167/91 (BP Location: Left arm, Patient Position: Sitting, Cuff Size: Adult Regular)   Pulse 77   Ht 1.626 m (5' 4\")   Wt 108 kg (238 lb)   SpO2 92%   BMI 40.85 kg/m    General Appearance: healthy, alert and no distress   Skin: no suspicious lesions or rashes  Neuro: Normal strength and tone, mentation intact and speech normal  Vascular: good pulses, and cappillary refill   Lymph: no lymphadenopathy   Psych:  mentation appears normal and affect normal/bright  Resp: no increased work of breathing     Left Knee Exam:  Gait: walks with antalgic gait favoring left side   Alignment: normal     Patellofemoral joint: no crepitations in the patellofemoral joint.  Effusion: mild  ROM: 3-110  Tender: diffuse  Masses: some fullness of the Pes  Ligaments:   Lachman's: stable   Anterior/Posterior drawer: stable,   Varus/Valgus stress: stable to varus and valgus stress  McMurrays: pain but no catching with hyperflexion    X-rays:  Obtained 3/6/23, reviewed in the office with the patient today:  Moderately severe tricompartmental joint space narrowing and " hypertrophic changes.       ASSESSMENT:   Moderately severe left knee osteoarthritis     PLAN:   Total knee arthroplasty is an option. Her BMI is slightly above our threshold. Risks of high bmi and total knees discussed.  She would like to try a viscosupplementation injection, and use that time to work on her weight.  The approval process for Synvisc ONE was started.    Return to clinic: for the injection     JENNIE Alfredo MD  Dept. Orthopedic Surgery  [facility]

## 2023-06-15 ENCOUNTER — OFFICE VISIT (OUTPATIENT)
Dept: ORTHOPEDICS | Facility: CLINIC | Age: 68
End: 2023-06-15
Attending: PEDIATRICS
Payer: COMMERCIAL

## 2023-06-15 ENCOUNTER — DOCUMENTATION ONLY (OUTPATIENT)
Dept: ORTHOPEDICS | Facility: CLINIC | Age: 68
End: 2023-06-15

## 2023-06-15 VITALS
BODY MASS INDEX: 40.63 KG/M2 | HEART RATE: 77 BPM | OXYGEN SATURATION: 92 % | SYSTOLIC BLOOD PRESSURE: 167 MMHG | WEIGHT: 238 LBS | HEIGHT: 64 IN | DIASTOLIC BLOOD PRESSURE: 91 MMHG

## 2023-06-15 DIAGNOSIS — M17.12 PRIMARY OSTEOARTHRITIS OF LEFT KNEE: ICD-10-CM

## 2023-06-15 PROCEDURE — 99204 OFFICE O/P NEW MOD 45 MIN: CPT | Performed by: ORTHOPAEDIC SURGERY

## 2023-06-15 ASSESSMENT — PAIN SCALES - GENERAL: PAINLEVEL: MODERATE PAIN (5)

## 2023-06-15 NOTE — LETTER
6/15/2023         RE: Lala Yuen  [address]  [facility] [address]        Dear Colleague,    Thank you for referring your patient, Lala Yuen, to the [facility]. Please see a copy of my visit note below.    SUBJECTIVE:   Lala Yuen is a 68 year old female who is seen in consultation at the request of Dr. Robles for evaluation of left knee pain.  Duration: > 7 months to a year.  No known injury.    Present symptoms: swelling medial (pes?)  Pain up and down stairs  No night pain  Getting up from chair.  Any incline or rough surface pain, and feels unsteady  Can't trust knee  Pain with flexion and extension extremes.   Pain diffuse.     Treatments tried:L knee Cortisone injection 3/21/23-- helped for a couple of days,  ice, Tylenol and physical therapy     Previous knee issues: none      Past Medical History:   Past Medical History:   Diagnosis Date     Abnormal Pap smear 1985    cryo, nl paps since     Depression      Glaucoma 01/30/2020    left eye      HTN (hypertension)      Hyperlipidemia      Hyperlipidemia LDL goal < 130      Hypertension      Past Surgical History:   Past Surgical History:   Procedure Laterality Date     CHOLECYSTECTOMY       CHOLECYSTECTOMY, LAPOROSCOPIC            OPERATIVE HYSTEROSCOPY WITH MORCELLATOR N/A 3/23/2021    Procedure: Diagnostic Hysteroscopy with biopsy;  Surgeon: Deny Tapia MD;  Location:  OR     OTHER SURGICAL HISTORY      uterine polypectomy      SHOULDER SURGERY Right 3/29/2021    Procedure: CLOSED REDUCTION, SHOULDER;  Surgeon: Melo Mckeon MD;  Location: [facility];  Service: Orthopedics     Family History:   Family History   Problem Relation Age of Onset     Breast Cancer Mother      Hypertension Mother      Lipids Mother      Cancer Father         kidney cancer     Diabetes Paternal Grandfather         adult     Breast Cancer Sister      Cancer Sister      Heart Disease Maternal Aunt       "Cancer Sister         pancreatic     Social History:   Social History     Tobacco Use     Smoking status: Never     Smokeless tobacco: Never     Tobacco comments:     no smokers in the [facility]   Vaping Use     Vaping status: Never Used   Substance Use Topics     Alcohol use: Yes     Comment: occasional       Review of Systems:  Constitutional:  NEGATIVE for fever, chills, change in weight  Integumentary/Skin:  NEGATIVE for worrisome rashes, moles or lesions  Eyes:  NEGATIVE for vision changes or irritation  ENT/Mouth:  NEGATIVE for ear, mouth and throat problems  Resp:  NEGATIVE for significant cough or SOB  Breast:  NEGATIVE for masses, tenderness or discharge  CV:  NEGATIVE for chest pain, palpitations or peripheral edema  GI:  NEGATIVE for nausea, abdominal pain, heartburn, or change in bowel habits  :  Negative   Musculoskeletal:  See HPI above  Neuro:  NEGATIVE for weakness, dizziness or paresthesias  Endocrine:  NEGATIVE for temperature intolerance, skin/hair changes  Heme/allergy/immune:  NEGATIVE for bleeding problems  Psychiatric:  NEGATIVE for changes in mood or affect      OBJECTIVE:  Physical Exam:  BP (!) 167/91 (BP Location: Left arm, Patient Position: Sitting, Cuff Size: Adult Regular)   Pulse 77   Ht 1.626 m (5' 4\")   Wt 108 kg (238 lb)   SpO2 92%   BMI 40.85 kg/m    General Appearance: healthy, alert and no distress   Skin: no suspicious lesions or rashes  Neuro: Normal strength and tone, mentation intact and speech normal  Vascular: good pulses, and cappillary refill   Lymph: no lymphadenopathy   Psych:  mentation appears normal and affect normal/bright  Resp: no increased work of breathing     Left Knee Exam:  Gait: walks with antalgic gait favoring left side   Alignment: normal     Patellofemoral joint: no crepitations in the patellofemoral joint.  Effusion: mild  ROM: 3-110  Tender: diffuse  Masses: some fullness of the Pes  Ligaments:   Lachman's: stable   Anterior/Posterior drawer: " stable,   Varus/Valgus stress: stable to varus and valgus stress  McMurrays: pain but no catching with hyperflexion    X-rays:  Obtained 3/6/23, reviewed in the office with the patient today:  Moderately severe tricompartmental joint space narrowing and hypertrophic changes.       ASSESSMENT:   Moderately severe left knee osteoarthritis     PLAN:   Total knee arthroplasty is an option. Her BMI is slightly above our threshold. Risks of high bmi and total knees discussed.  She would like to try a viscosupplementation injection, and use that time to work on her weight.  The approval process for Synvisc ONE was started.    Return to clinic: for the injection     JENNIE Alfredo MD  Dept. Orthopedic Surgery  [facility]       Again, thank you for allowing me to participate in the care of your patient.        Sincerely,        Michael Alfredo MD

## 2023-06-20 ENCOUNTER — TRANSFERRED RECORDS (OUTPATIENT)
Dept: HEALTH INFORMATION MANAGEMENT | Facility: CLINIC | Age: 68
End: 2023-06-20
Payer: COMMERCIAL

## 2023-06-20 NOTE — PROGRESS NOTES
N/A P: VM message left advising she can make [facility] f/u next week 6/29/23 or other later [facility] availability for this PA approved Gel injection of knee.  Ermias Patel OPA

## 2023-08-07 ENCOUNTER — TELEPHONE (OUTPATIENT)
Dept: FAMILY MEDICINE | Facility: CLINIC | Age: 68
End: 2023-08-07
Payer: COMMERCIAL

## 2023-08-07 NOTE — TELEPHONE ENCOUNTER
Please send letter and include [id number] with return envelope      Lala  You are due now for the mood questionnaires. I have included them with this message; please complete them and send them back.   You should be coming due for a refill of your medication soon.  Any side effects or concerns you have related to your mood or medications?   Hope to hear from you soon,    Colleen Marroquin M.D.

## 2023-08-07 NOTE — LETTER
August 7, 2023    Lala Yuen  [address]  [facility] [address]      Odell Reeves,    You are due now for the mood questionnaires. I have included them with this message; please complete them and send them back.  You should be coming due for a refill of your medication soon.  Any side effects or concerns you have related to your mood or medications?   Hope to hear from you soon,     Please call at your earliest convenience so that there will not be a delay with your future refills.    Thank you.      Colleen Marroquin MD/bmc  [facility]  [phone]

## 2023-08-16 ASSESSMENT — ANXIETY QUESTIONNAIRES
6. BECOMING EASILY ANNOYED OR IRRITABLE: NOT AT ALL
IF YOU CHECKED OFF ANY PROBLEMS ON THIS QUESTIONNAIRE, HOW DIFFICULT HAVE THESE PROBLEMS MADE IT FOR YOU TO DO YOUR WORK, TAKE CARE OF THINGS AT HOME, OR GET ALONG WITH OTHER PEOPLE: NOT DIFFICULT AT ALL
1. FEELING NERVOUS, ANXIOUS, OR ON EDGE: SEVERAL DAYS
7. FEELING AFRAID AS IF SOMETHING AWFUL MIGHT HAPPEN: NOT AT ALL
5. BEING SO RESTLESS THAT IT IS HARD TO SIT STILL: NOT AT ALL
GAD7 TOTAL SCORE: 3
GAD7 TOTAL SCORE: 3
3. WORRYING TOO MUCH ABOUT DIFFERENT THINGS: SEVERAL DAYS
2. NOT BEING ABLE TO STOP OR CONTROL WORRYING: SEVERAL DAYS

## 2023-08-16 ASSESSMENT — PATIENT HEALTH QUESTIONNAIRE - PHQ9
SUM OF ALL RESPONSES TO PHQ QUESTIONS 1-9: 1
5. POOR APPETITE OR OVEREATING: NOT AT ALL

## 2023-08-22 ENCOUNTER — TRANSFERRED RECORDS (OUTPATIENT)
Dept: HEALTH INFORMATION MANAGEMENT | Facility: CLINIC | Age: 68
End: 2023-08-22
Payer: COMMERCIAL

## 2023-09-02 DIAGNOSIS — F41.9 ANXIETY: ICD-10-CM

## 2023-09-02 DIAGNOSIS — F32.3 MAJOR DEPRESSIVE DISORDER, SINGLE EPISODE, SEVERE WITH PSYCHOTIC FEATURES (H): ICD-10-CM

## 2023-09-26 DIAGNOSIS — F32.3 MAJOR DEPRESSIVE DISORDER, SINGLE EPISODE, SEVERE WITH PSYCHOTIC FEATURES (H): ICD-10-CM

## 2023-09-26 DIAGNOSIS — I50.32 CHRONIC HEART FAILURE WITH PRESERVED EJECTION FRACTION (H): Primary | ICD-10-CM

## 2023-09-26 RX ORDER — GABAPENTIN 300 MG/1
300 CAPSULE ORAL 3 TIMES DAILY
Qty: 270 CAPSULE | Refills: 1 | Status: SHIPPED | OUTPATIENT
Start: 2023-09-26 | End: 2024-03-14

## 2023-09-26 NOTE — TELEPHONE ENCOUNTER
Please mail letter    Odell Montoya,    I received a refill request for gabapentin.  I have sent a 6 month supply to your pharmacy.  You are due for labs now; I have ordered the tests, please call to make a non-fasting lab appointment within 2 weeks.      Flu/covid season is coming so be sure to get your flu and updated covid shot at any pharmacy or with our shot nurse.     Colleen Marroquin MD

## 2023-09-26 NOTE — LETTER
September 26, 2023    Lala A Wilfrid  [address]  [facility] [address]    Hi Lindsay,     I received a refill request for gabapentin.  I have sent a 6 month supply to your pharmacy.  You are due for labs now; I have ordered the tests, please call to make a non-fasting lab appointment within 2 weeks.       Flu/covid season is coming so be sure to get your flu and updated covid shot at any pharmacy or with our shot nurse.     Please call at your earliest convenience so that there will not be a delay with your future refills.    Thank you.       Colleen Marroquin MD/bmc  [phone]

## 2023-10-04 PROBLEM — M25.562 CHRONIC PAIN OF LEFT KNEE: Status: RESOLVED | Noted: 2023-03-14 | Resolved: 2023-10-04

## 2023-10-04 PROBLEM — G89.29 CHRONIC PAIN OF LEFT KNEE: Status: RESOLVED | Noted: 2023-03-14 | Resolved: 2023-10-04

## 2023-10-04 PROBLEM — R26.9 ABNORMAL GAIT: Status: RESOLVED | Noted: 2023-04-25 | Resolved: 2023-10-04

## 2023-10-04 NOTE — PROGRESS NOTES
DISCHARGE SUMMARY    Lala Yuen was seen  6 times for evaluation and treatment.  Spoke with Dr Robles and Lindsay was going to have orthopedic consult for her knee due to slow progress and continued pain.   Please see goal flow sheet from episode noted date below and initial evaluation for further information.  Patient is discharged from therapy and therapy episode is resolved as of 10/04/23.      Linked Episodes   Type: Episode: Status: Noted: Resolved: Last update: Updated by:   PHYSICAL THERAPY L knee pain 3-14-23 Active 3/14/2023  5/23/2023  1:09 PM Miya Montez, PT      Comments:

## 2023-10-21 ENCOUNTER — LAB (OUTPATIENT)
Dept: LAB | Facility: CLINIC | Age: 68
End: 2023-10-21
Payer: COMMERCIAL

## 2023-10-21 DIAGNOSIS — I50.32 CHRONIC HEART FAILURE WITH PRESERVED EJECTION FRACTION (H): ICD-10-CM

## 2023-10-21 DIAGNOSIS — I10 HYPERTENSION GOAL BP (BLOOD PRESSURE) < 140/90: ICD-10-CM

## 2023-10-21 LAB
ALBUMIN SERPL BCG-MCNC: 4.4 G/DL (ref 3.5–5.2)
ALP SERPL-CCNC: 88 U/L (ref 35–104)
ALT SERPL W P-5'-P-CCNC: 34 U/L (ref 0–50)
ANION GAP SERPL CALCULATED.3IONS-SCNC: 12 MMOL/L (ref 7–15)
AST SERPL W P-5'-P-CCNC: 29 U/L (ref 0–45)
BILIRUB SERPL-MCNC: 0.4 MG/DL
BUN SERPL-MCNC: 20.7 MG/DL (ref 8–23)
CALCIUM SERPL-MCNC: 9.7 MG/DL (ref 8.8–10.2)
CHLORIDE SERPL-SCNC: 104 MMOL/L (ref 98–107)
CREAT SERPL-MCNC: 0.6 MG/DL (ref 0.51–0.95)
DEPRECATED HCO3 PLAS-SCNC: 23 MMOL/L (ref 22–29)
EGFRCR SERPLBLD CKD-EPI 2021: >90 ML/MIN/1.73M2
ERYTHROCYTE [DISTWIDTH] IN BLOOD BY AUTOMATED COUNT: 13.6 % (ref 10–15)
GLUCOSE SERPL-MCNC: 112 MG/DL (ref 70–99)
HCT VFR BLD AUTO: 44.6 % (ref 35–47)
HGB BLD-MCNC: 14.9 G/DL (ref 11.7–15.7)
MCH RBC QN AUTO: 29.7 PG (ref 26.5–33)
MCHC RBC AUTO-ENTMCNC: 33.4 G/DL (ref 31.5–36.5)
MCV RBC AUTO: 89 FL (ref 78–100)
PLATELET # BLD AUTO: 310 10E3/UL (ref 150–450)
POTASSIUM SERPL-SCNC: 4.1 MMOL/L (ref 3.4–5.3)
PROT SERPL-MCNC: 8 G/DL (ref 6.4–8.3)
RBC # BLD AUTO: 5.01 10E6/UL (ref 3.8–5.2)
SODIUM SERPL-SCNC: 139 MMOL/L (ref 135–145)
TSH SERPL DL<=0.005 MIU/L-ACNC: 1.48 UIU/ML (ref 0.3–4.2)
WBC # BLD AUTO: 7.9 10E3/UL (ref 4–11)

## 2023-10-21 PROCEDURE — 80053 COMPREHEN METABOLIC PANEL: CPT

## 2023-10-21 PROCEDURE — 84443 ASSAY THYROID STIM HORMONE: CPT

## 2023-10-21 PROCEDURE — 85027 COMPLETE CBC AUTOMATED: CPT

## 2023-10-21 PROCEDURE — 36415 COLL VENOUS BLD VENIPUNCTURE: CPT

## 2023-10-21 NOTE — LETTER
October 23, 2023      Lindsay Yuen  [address]  [facility] [address]        Dear Lindsay,    Your recent labs are normal for non-fasting labs.     Flu/covid season is coming so be sure to get your flu and covid shots at any pharmacy or with our shot nurse.     See you in March for your wellness exam.     Resulted Orders   Comprehensive metabolic panel (BMP + Alb, Alk Phos, ALT, AST, Total. Bili, TP)   Result Value Ref Range    Sodium 139 135 - 145 mmol/L      Comment:      Reference intervals for this test were updated on 09/26/2023 to more accurately reflect our healthy population. There may be differences in the flagging of prior results with similar values performed with this method. Interpretation of those prior results can be made in the context of the updated reference intervals.     Potassium 4.1 3.4 - 5.3 mmol/L    Carbon Dioxide (CO2) 23 22 - 29 mmol/L    Anion Gap 12 7 - 15 mmol/L    Urea Nitrogen 20.7 8.0 - 23.0 mg/dL    Creatinine 0.60 0.51 - 0.95 mg/dL    GFR Estimate >90 >60 mL/min/1.73m2    Calcium 9.7 8.8 - 10.2 mg/dL    Chloride 104 98 - 107 mmol/L    Glucose 112 (H) 70 - 99 mg/dL    Alkaline Phosphatase 88 35 - 104 U/L    AST 29 0 - 45 U/L      Comment:      Reference intervals for this test were updated on 6/12/2023 to more accurately reflect our healthy population. There may be differences in the flagging of prior results with similar values performed with this method. Interpretation of those prior results can be made in the context of the updated reference intervals.    ALT 34 0 - 50 U/L      Comment:      Reference intervals for this test were updated on 6/12/2023 to more accurately reflect our healthy population. There may be differences in the flagging of prior results with similar values performed with this method. Interpretation of those prior results can be made in the context of the updated reference intervals.      Protein Total 8.0 6.4 - 8.3 g/dL    Albumin 4.4 3.5 - 5.2 g/dL     Bilirubin Total 0.4 <=1.2 mg/dL   CBC with platelets   Result Value Ref Range    WBC Count 7.9 4.0 - 11.0 10e3/uL    RBC Count 5.01 3.80 - 5.20 10e6/uL    Hemoglobin 14.9 11.7 - 15.7 g/dL    Hematocrit 44.6 35.0 - 47.0 %    MCV 89 78 - 100 fL    MCH 29.7 26.5 - 33.0 pg    MCHC 33.4 31.5 - 36.5 g/dL    RDW 13.6 10.0 - 15.0 %    Platelet Count 310 150 - 450 10e3/uL   TSH with free T4 reflex   Result Value Ref Range    TSH 1.48 0.30 - 4.20 uIU/mL       If you have any questions or concerns, please call the clinic at the number listed above.     Sincerely,        Colleen Marroquin MD/bmc

## 2023-10-31 DIAGNOSIS — F41.9 ANXIETY: ICD-10-CM

## 2023-10-31 DIAGNOSIS — F32.3 MAJOR DEPRESSIVE DISORDER, SINGLE EPISODE, SEVERE WITH PSYCHOTIC FEATURES (H): ICD-10-CM

## 2023-11-01 RX ORDER — ARIPIPRAZOLE 5 MG/1
5 TABLET ORAL EVERY MORNING
Qty: 90 TABLET | Refills: 0 | Status: SHIPPED | OUTPATIENT
Start: 2023-11-01 | End: 2024-02-02

## 2023-11-14 ENCOUNTER — TRANSFERRED RECORDS (OUTPATIENT)
Dept: HEALTH INFORMATION MANAGEMENT | Facility: CLINIC | Age: 68
End: 2023-11-14
Payer: COMMERCIAL

## 2023-11-18 DIAGNOSIS — I10 HYPERTENSION GOAL BP (BLOOD PRESSURE) < 140/90: ICD-10-CM

## 2023-11-18 DIAGNOSIS — I50.32 CHRONIC HEART FAILURE WITH PRESERVED EJECTION FRACTION (H): ICD-10-CM

## 2023-11-18 NOTE — LETTER
November 20, 2023    Lala BRANDEN Yuen  [address]  [facility] [address]      Odell Montoya,     I received a refill request for blood pressure medications.  I have sent a 90 day supply to your pharmacy.  You are due for labs;I have ordered the tests, please call to make a non-fasting lab appointment within 2 weeks.       Also, please schedule a visit with our pharmacy or ancillary nurse for a blood pressure check as your last blood pressure was high.     Happy Thanksgiving.        Colleen Marroquin MD/bmc   [phone]

## 2023-11-19 RX ORDER — FUROSEMIDE 40 MG
40 TABLET ORAL DAILY
Qty: 90 TABLET | Refills: 0 | Status: SHIPPED | OUTPATIENT
Start: 2023-11-19 | End: 2024-03-05

## 2023-11-19 RX ORDER — LOSARTAN POTASSIUM 50 MG/1
50 TABLET ORAL DAILY
Qty: 90 TABLET | Refills: 0 | Status: SHIPPED | OUTPATIENT
Start: 2023-11-19 | End: 2024-02-20

## 2023-11-21 DIAGNOSIS — I10 HYPERTENSION GOAL BP (BLOOD PRESSURE) < 140/90: ICD-10-CM

## 2023-11-21 RX ORDER — LOSARTAN POTASSIUM 50 MG/1
50 TABLET ORAL DAILY
Qty: 90 TABLET | Refills: 0 | OUTPATIENT
Start: 2023-11-21

## 2023-12-16 ENCOUNTER — LAB (OUTPATIENT)
Dept: LAB | Facility: CLINIC | Age: 68
End: 2023-12-16
Payer: COMMERCIAL

## 2023-12-16 ENCOUNTER — ALLIED HEALTH/NURSE VISIT (OUTPATIENT)
Dept: FAMILY MEDICINE | Facility: CLINIC | Age: 68
End: 2023-12-16

## 2023-12-16 VITALS — SYSTOLIC BLOOD PRESSURE: 138 MMHG | DIASTOLIC BLOOD PRESSURE: 78 MMHG | HEART RATE: 76 BPM

## 2023-12-16 DIAGNOSIS — Z01.30 BLOOD PRESSURE CHECK: Primary | ICD-10-CM

## 2023-12-16 DIAGNOSIS — I10 HYPERTENSION GOAL BP (BLOOD PRESSURE) < 140/90: ICD-10-CM

## 2023-12-16 DIAGNOSIS — I50.32 CHRONIC HEART FAILURE WITH PRESERVED EJECTION FRACTION (H): ICD-10-CM

## 2023-12-16 LAB
ANION GAP SERPL CALCULATED.3IONS-SCNC: 11 MMOL/L (ref 7–15)
BUN SERPL-MCNC: 18.4 MG/DL (ref 8–23)
CALCIUM SERPL-MCNC: 9.7 MG/DL (ref 8.8–10.2)
CHLORIDE SERPL-SCNC: 101 MMOL/L (ref 98–107)
CREAT SERPL-MCNC: 0.58 MG/DL (ref 0.51–0.95)
DEPRECATED HCO3 PLAS-SCNC: 24 MMOL/L (ref 22–29)
EGFRCR SERPLBLD CKD-EPI 2021: >90 ML/MIN/1.73M2
GLUCOSE SERPL-MCNC: 110 MG/DL (ref 70–99)
POTASSIUM SERPL-SCNC: 4.4 MMOL/L (ref 3.4–5.3)
SODIUM SERPL-SCNC: 136 MMOL/L (ref 135–145)

## 2023-12-16 PROCEDURE — 80048 BASIC METABOLIC PNL TOTAL CA: CPT

## 2023-12-16 PROCEDURE — 99207 PR NO CHARGE NURSE ONLY: CPT | Performed by: FAMILY MEDICINE

## 2023-12-16 PROCEDURE — 36415 COLL VENOUS BLD VENIPUNCTURE: CPT

## 2023-12-16 NOTE — PROGRESS NOTES
Lala Yuen was evaluated at [facility] on December 16, 2023 at which time her blood pressure was:    BP Readings from Last 3 Encounters:   12/16/23 138/78   06/15/23 (!) 167/91   03/06/23 128/68     Pulse Readings from Last 3 Encounters:   12/16/23 76   06/15/23 77   03/21/23 80       Reviewed lifestyle modifications for blood pressure control and reduction: including making healthy food choices, managing weight, getting regular exercise, smoking cessation, reducing alcohol consumption, monitoring blood pressure regularly.     Symptoms: None    BP Goal:< 140/90 mmHg    BP Assessment:  BP at goal    Potential Reasons for BP too high: NA - Not applicable    BP Follow-Up Plan: Recheck BP in 6 months at pharmacy    Recommendation to Provider: None    Note completed by:Fredis Dixon RPh.  [facility]  [phone]

## 2024-02-01 DIAGNOSIS — F41.9 ANXIETY: ICD-10-CM

## 2024-02-01 DIAGNOSIS — F32.3 MAJOR DEPRESSIVE DISORDER, SINGLE EPISODE, SEVERE WITH PSYCHOTIC FEATURES (H): ICD-10-CM

## 2024-02-01 NOTE — LETTER
February 2, 2024    Lala A Wilfrid  [address]  [facility] [address]        Odell Montoya,     I received a refill request for aripiprazole.  I have sent a 90 day supply to your pharmacy.  You are due for your wellness exam in March; please call now as those types of appointments are already booking into March.    You will need labs prior to your visit so please schedule that appointment at least 3 days prior to your appointment with me.          You are due now for the mood questionnaires. I have attached them to this message; please complete them and send them back.      Colleen Marroquin MD

## 2024-02-02 RX ORDER — ARIPIPRAZOLE 5 MG/1
5 TABLET ORAL EVERY MORNING
Qty: 90 TABLET | Refills: 0 | Status: SHIPPED | OUTPATIENT
Start: 2024-02-02 | End: 2024-03-14

## 2024-02-02 NOTE — TELEPHONE ENCOUNTER
Please mail letter, please included phq9/gad7 with return envelope    Hi Lindsay,    I received a refill request for aripiprazole.  I have sent a 90 day supply to your pharmacy.  You are due for your wellness exam in March; please call now as those types of appointments are already booking into March.    You will need labs prior to your visit so please schedule that appointment at least 3 days prior to your appointment with me.        You are due now for the mood questionnaires. I have attached them to this message; please complete them and send them back.     Colleen Marroquin MD

## 2024-02-05 ENCOUNTER — PATIENT OUTREACH (OUTPATIENT)
Dept: CARE COORDINATION | Facility: CLINIC | Age: 69
End: 2024-02-05
Payer: COMMERCIAL

## 2024-02-19 ENCOUNTER — PATIENT OUTREACH (OUTPATIENT)
Dept: CARE COORDINATION | Facility: CLINIC | Age: 69
End: 2024-02-19
Payer: COMMERCIAL

## 2024-02-20 DIAGNOSIS — I10 HYPERTENSION GOAL BP (BLOOD PRESSURE) < 140/90: ICD-10-CM

## 2024-02-20 RX ORDER — LOSARTAN POTASSIUM 50 MG/1
50 TABLET ORAL DAILY
Qty: 90 TABLET | Refills: 0 | Status: SHIPPED | OUTPATIENT
Start: 2024-02-20 | End: 2024-03-14

## 2024-02-29 ENCOUNTER — DOCUMENTATION ONLY (OUTPATIENT)
Dept: FAMILY MEDICINE | Facility: CLINIC | Age: 69
End: 2024-02-29
Payer: COMMERCIAL

## 2024-02-29 DIAGNOSIS — I50.32 CHRONIC HEART FAILURE WITH PRESERVED EJECTION FRACTION (H): ICD-10-CM

## 2024-02-29 DIAGNOSIS — E78.5 HYPERLIPIDEMIA WITH TARGET LDL LESS THAN 130: ICD-10-CM

## 2024-02-29 DIAGNOSIS — I10 HYPERTENSION GOAL BP (BLOOD PRESSURE) < 140/90: Primary | ICD-10-CM

## 2024-02-29 DIAGNOSIS — R73.09 ELEVATED GLUCOSE: ICD-10-CM

## 2024-02-29 NOTE — PROGRESS NOTES
Lala OTERO Hikira has an upcoming lab appointment:    Future Appointments   Date Time Provider Department Center   3/11/2024  8:45 AM AN [facility]   3/14/2024  2:00 PM Colleen Marroquin MD [facility]       There is no order available. Please review and place either future orders or HMPO (Review of Health Maintenance Protocol Orders), as appropriate.    Health Maintenance Due   Topic    ANNUAL REVIEW OF HM ORDERS     LIPID      Raoul GONZALEZT

## 2024-03-02 DIAGNOSIS — F32.3 MAJOR DEPRESSIVE DISORDER, SINGLE EPISODE, SEVERE WITH PSYCHOTIC FEATURES (H): ICD-10-CM

## 2024-03-02 DIAGNOSIS — F41.9 ANXIETY: ICD-10-CM

## 2024-03-02 RX ORDER — LATANOPROST 50 UG/ML
SOLUTION/ DROPS OPHTHALMIC
COMMUNITY
Start: 2024-02-05

## 2024-03-05 DIAGNOSIS — I50.32 CHRONIC HEART FAILURE WITH PRESERVED EJECTION FRACTION (H): ICD-10-CM

## 2024-03-05 RX ORDER — FUROSEMIDE 40 MG
40 TABLET ORAL DAILY
Qty: 90 TABLET | Refills: 0 | Status: SHIPPED | OUTPATIENT
Start: 2024-03-05 | End: 2024-03-14

## 2024-03-11 ENCOUNTER — LAB (OUTPATIENT)
Dept: LAB | Facility: CLINIC | Age: 69
End: 2024-03-11
Payer: COMMERCIAL

## 2024-03-11 DIAGNOSIS — R73.09 ELEVATED GLUCOSE: ICD-10-CM

## 2024-03-11 DIAGNOSIS — I50.32 CHRONIC HEART FAILURE WITH PRESERVED EJECTION FRACTION (H): ICD-10-CM

## 2024-03-11 DIAGNOSIS — E78.5 HYPERLIPIDEMIA WITH TARGET LDL LESS THAN 130: ICD-10-CM

## 2024-03-11 DIAGNOSIS — I10 HYPERTENSION GOAL BP (BLOOD PRESSURE) < 140/90: ICD-10-CM

## 2024-03-11 LAB
ALBUMIN SERPL BCG-MCNC: 4.3 G/DL (ref 3.5–5.2)
ALP SERPL-CCNC: 88 U/L (ref 40–150)
ALT SERPL W P-5'-P-CCNC: 48 U/L (ref 0–50)
ANION GAP SERPL CALCULATED.3IONS-SCNC: 10 MMOL/L (ref 7–15)
AST SERPL W P-5'-P-CCNC: 35 U/L (ref 0–45)
BILIRUB SERPL-MCNC: 0.5 MG/DL
BUN SERPL-MCNC: 18 MG/DL (ref 8–23)
CALCIUM SERPL-MCNC: 11.2 MG/DL (ref 8.8–10.2)
CHLORIDE SERPL-SCNC: 105 MMOL/L (ref 98–107)
CHOLEST SERPL-MCNC: 169 MG/DL
CREAT SERPL-MCNC: 0.75 MG/DL (ref 0.51–0.95)
DEPRECATED HCO3 PLAS-SCNC: 24 MMOL/L (ref 22–29)
EGFRCR SERPLBLD CKD-EPI 2021: 86 ML/MIN/1.73M2
FASTING STATUS PATIENT QL REPORTED: YES
GLUCOSE SERPL-MCNC: 118 MG/DL (ref 70–99)
HBA1C MFR BLD: 5.8 % (ref 0–5.6)
HDLC SERPL-MCNC: 54 MG/DL
LDLC SERPL CALC-MCNC: 85 MG/DL
NONHDLC SERPL-MCNC: 115 MG/DL
POTASSIUM SERPL-SCNC: 4.3 MMOL/L (ref 3.4–5.3)
PROT SERPL-MCNC: 8.1 G/DL (ref 6.4–8.3)
SODIUM SERPL-SCNC: 139 MMOL/L (ref 135–145)
TRIGL SERPL-MCNC: 151 MG/DL

## 2024-03-11 PROCEDURE — 36415 COLL VENOUS BLD VENIPUNCTURE: CPT

## 2024-03-11 PROCEDURE — 83036 HEMOGLOBIN GLYCOSYLATED A1C: CPT

## 2024-03-11 PROCEDURE — 80053 COMPREHEN METABOLIC PANEL: CPT

## 2024-03-11 PROCEDURE — 80061 LIPID PANEL: CPT

## 2024-03-14 ENCOUNTER — ANCILLARY PROCEDURE (OUTPATIENT)
Dept: MAMMOGRAPHY | Facility: CLINIC | Age: 69
End: 2024-03-14
Attending: FAMILY MEDICINE
Payer: COMMERCIAL

## 2024-03-14 ENCOUNTER — OFFICE VISIT (OUTPATIENT)
Dept: FAMILY MEDICINE | Facility: CLINIC | Age: 69
End: 2024-03-14
Payer: COMMERCIAL

## 2024-03-14 VITALS
HEIGHT: 64 IN | WEIGHT: 246 LBS | SYSTOLIC BLOOD PRESSURE: 132 MMHG | HEART RATE: 75 BPM | BODY MASS INDEX: 42 KG/M2 | RESPIRATION RATE: 18 BRPM | DIASTOLIC BLOOD PRESSURE: 78 MMHG | TEMPERATURE: 97.8 F | OXYGEN SATURATION: 94 %

## 2024-03-14 DIAGNOSIS — E66.813 CLASS 3 SEVERE OBESITY DUE TO EXCESS CALORIES WITH SERIOUS COMORBIDITY AND BODY MASS INDEX (BMI) OF 40.0 TO 44.9 IN ADULT (H): ICD-10-CM

## 2024-03-14 DIAGNOSIS — I50.32 CHRONIC HEART FAILURE WITH PRESERVED EJECTION FRACTION (H): ICD-10-CM

## 2024-03-14 DIAGNOSIS — E83.52 HYPERCALCEMIA: ICD-10-CM

## 2024-03-14 DIAGNOSIS — F41.9 ANXIETY: ICD-10-CM

## 2024-03-14 DIAGNOSIS — E66.01 CLASS 3 SEVERE OBESITY DUE TO EXCESS CALORIES WITH SERIOUS COMORBIDITY AND BODY MASS INDEX (BMI) OF 40.0 TO 44.9 IN ADULT (H): ICD-10-CM

## 2024-03-14 DIAGNOSIS — I10 HYPERTENSION GOAL BP (BLOOD PRESSURE) < 140/90: ICD-10-CM

## 2024-03-14 DIAGNOSIS — Z74.09 IMPAIRED FUNCTIONAL MOBILITY, BALANCE, GAIT, AND ENDURANCE: ICD-10-CM

## 2024-03-14 DIAGNOSIS — F32.3 MAJOR DEPRESSIVE DISORDER, SINGLE EPISODE, SEVERE WITH PSYCHOTIC FEATURES (H): ICD-10-CM

## 2024-03-14 DIAGNOSIS — Z12.31 VISIT FOR SCREENING MAMMOGRAM: ICD-10-CM

## 2024-03-14 DIAGNOSIS — E55.9 VITAMIN D DEFICIENCY: ICD-10-CM

## 2024-03-14 DIAGNOSIS — E78.5 HYPERLIPIDEMIA WITH TARGET LDL LESS THAN 130: ICD-10-CM

## 2024-03-14 DIAGNOSIS — R73.03 PREDIABETES: ICD-10-CM

## 2024-03-14 DIAGNOSIS — Z00.00 ENCOUNTER FOR MEDICARE ANNUAL WELLNESS EXAM: Primary | ICD-10-CM

## 2024-03-14 PROCEDURE — 77063 BREAST TOMOSYNTHESIS BI: CPT | Mod: TC | Performed by: RADIOLOGY

## 2024-03-14 PROCEDURE — 80048 BASIC METABOLIC PNL TOTAL CA: CPT | Performed by: FAMILY MEDICINE

## 2024-03-14 PROCEDURE — 83970 ASSAY OF PARATHORMONE: CPT | Performed by: FAMILY MEDICINE

## 2024-03-14 PROCEDURE — 82306 VITAMIN D 25 HYDROXY: CPT | Performed by: FAMILY MEDICINE

## 2024-03-14 PROCEDURE — 99214 OFFICE O/P EST MOD 30 MIN: CPT | Mod: 25 | Performed by: FAMILY MEDICINE

## 2024-03-14 PROCEDURE — 36415 COLL VENOUS BLD VENIPUNCTURE: CPT | Performed by: FAMILY MEDICINE

## 2024-03-14 PROCEDURE — 77067 SCR MAMMO BI INCL CAD: CPT | Mod: TC | Performed by: RADIOLOGY

## 2024-03-14 PROCEDURE — 99397 PER PM REEVAL EST PAT 65+ YR: CPT | Performed by: FAMILY MEDICINE

## 2024-03-14 RX ORDER — ARIPIPRAZOLE 5 MG/1
5 TABLET ORAL EVERY MORNING
Qty: 90 TABLET | Refills: 1 | Status: SHIPPED | OUTPATIENT
Start: 2024-03-14

## 2024-03-14 RX ORDER — LOSARTAN POTASSIUM 50 MG/1
50 TABLET ORAL DAILY
Qty: 90 TABLET | Refills: 1 | Status: SHIPPED | OUTPATIENT
Start: 2024-02-20

## 2024-03-14 RX ORDER — ATORVASTATIN CALCIUM 10 MG/1
10 TABLET, FILM COATED ORAL DAILY
Qty: 90 TABLET | Refills: 3 | Status: SHIPPED | OUTPATIENT
Start: 2024-03-14

## 2024-03-14 RX ORDER — GABAPENTIN 300 MG/1
300 CAPSULE ORAL 3 TIMES DAILY
Qty: 270 CAPSULE | Refills: 1 | Status: SHIPPED | OUTPATIENT
Start: 2024-03-14

## 2024-03-14 RX ORDER — FUROSEMIDE 40 MG
40 TABLET ORAL DAILY
Qty: 90 TABLET | Refills: 1 | Status: SHIPPED | OUTPATIENT
Start: 2024-03-14

## 2024-03-14 SDOH — HEALTH STABILITY: PHYSICAL HEALTH: ON AVERAGE, HOW MANY DAYS PER WEEK DO YOU ENGAGE IN MODERATE TO STRENUOUS EXERCISE (LIKE A BRISK WALK)?: 2 DAYS

## 2024-03-14 ASSESSMENT — PATIENT HEALTH QUESTIONNAIRE - PHQ9
SUM OF ALL RESPONSES TO PHQ QUESTIONS 1-9: 2
SUM OF ALL RESPONSES TO PHQ QUESTIONS 1-9: 2
10. IF YOU CHECKED OFF ANY PROBLEMS, HOW DIFFICULT HAVE THESE PROBLEMS MADE IT FOR YOU TO DO YOUR WORK, TAKE CARE OF THINGS AT HOME, OR GET ALONG WITH OTHER PEOPLE: NOT DIFFICULT AT ALL

## 2024-03-14 ASSESSMENT — PAIN SCALES - GENERAL: PAINLEVEL: NO PAIN (0)

## 2024-03-14 ASSESSMENT — SOCIAL DETERMINANTS OF HEALTH (SDOH): HOW OFTEN DO YOU GET TOGETHER WITH FRIENDS OR RELATIVES?: ONCE A WEEK

## 2024-03-14 NOTE — LETTER
March 18, 2024      Lindsay Yuen  [address]  [facility] [address]          Lindsay   The repeat labs show a normal calcium this time.  It does show a low vit D.   I have ordered a replacement regimen; you can pick it up at your pharmacy.  Please take these medications and we should recheck in 8 weeks.     Colleen Marroquin MD     Resulted Orders   Vitamin D Deficiency   Result Value Ref Range    Vitamin D, Total (25-Hydroxy) 15 (L) 20 - 50 ng/mL      Comment:      mild to moderate deficiency    Narrative    Season, race, dietary intake, and treatment affect the concentration of 25-hydroxy-Vitamin D. Values may decrease during winter months and increase during summer months.    Vitamin D determination is routinely performed by an immunoassay specific for 25 hydroxyvitamin D3.  If an individual is on vitamin D2(ergocalciferol) supplementation, please specify 25 OH vitamin D2 and D3 level determination by LCMSMS test VITD23.     Parathyroid Hormone Intact   Result Value Ref Range    Parathyroid Hormone Intact 59 15 - 65 pg/mL    Narrative    This result was obtained with the Roche Elecsys PTH STAT assay.   This reference range differs from PTH assays used in other [facility] laboratories.   Basic metabolic panel  (Ca, Cl, CO2, Creat, Gluc, K, Na, BUN)   Result Value Ref Range    Sodium 137 135 - 145 mmol/L      Comment:      Reference intervals for this test were updated on 09/26/2023 to more accurately reflect our healthy population. There may be differences in the flagging of prior results with similar values performed with this method. Interpretation of those prior results can be made in the context of the updated reference intervals.     Potassium 4.0 3.4 - 5.3 mmol/L    Chloride 103 98 - 107 mmol/L    Carbon Dioxide (CO2) 23 22 - 29 mmol/L    Anion Gap 11 7 - 15 mmol/L    Urea Nitrogen 19.5 8.0 - 23.0 mg/dL    Creatinine 0.66 0.51 - 0.95 mg/dL    GFR Estimate >90 >60 mL/min/1.73m2    Calcium 9.5  8.8 - 10.2 mg/dL    Glucose 105 (H) 70 - 99 mg/dL       If you have any questions or concerns, please call the clinic at the number listed above.

## 2024-03-14 NOTE — PATIENT INSTRUCTIONS
Preventive Care Advice   This is general advice given by our system to help you stay healthy. However, your care team may have specific advice just for you. Please talk to your care team about your preventive care needs.  Nutrition  Eat 5 or more servings of fruits and vegetables each day.  Try wheat bread, brown rice and whole grain pasta (instead of white bread, rice, and pasta).  Get enough calcium and vitamin D. Check the label on foods and aim for 100% of the RDA (recommended daily allowance).  Lifestyle  Exercise at least 150 minutes each week   (30 minutes a day, 5 days a week).  Do muscle strengthening activities 2 days a week. These help control your weight and prevent disease.  No smoking.  Wear sunscreen to prevent skin cancer.  Have a dental exam and cleaning every 6 months.  Yearly exams  See your health care team every year to talk about:  Any changes in your health.  Any medicines your care team has prescribed.  Preventive care, family planning, and ways to prevent chronic diseases.  Shots (vaccines)   HPV shots (up to age 26), if you've never had them before.  Hepatitis B shots (up to age 59), if you've never had them before.  COVID-19 shot: Get this shot when it's due.  Flu shot: Get a flu shot every year.  Tetanus shot: Get a tetanus shot every 10 years.  Pneumococcal, hepatitis A, and RSV shots: Ask your care team if you need these based on your risk.  Shingles shot (for age 50 and up).  General health tests  Diabetes screening:  Starting at age 35, Get screened for diabetes at least every 3 years.  If you are younger than age 35, ask your care team if you should be screened for diabetes.  Cholesterol test: At age 39, start having a cholesterol test every 5 years, or more often if advised.  Bone density scan (DEXA): At age 50, ask your care team if you should have this scan for osteoporosis (brittle bones).  Hepatitis C: Get tested at least once in your life.  STIs (sexually transmitted  infections)  Before age 24: Ask your care team if you should be screened for STIs.  After age 24: Get screened for STIs if you're at risk. You are at risk for STIs (including HIV) if:  You are sexually active with more than one person.  You don't use condoms every time.  You or a partner was diagnosed with a sexually transmitted infection.  If you are at risk for HIV, ask about PrEP medicine to prevent HIV.  Get tested for HIV at least once in your life, whether you are at risk for HIV or not.  Cancer screening tests  Cervical cancer screening: If you have a cervix, begin getting regular cervical cancer screening tests at age 21. Most people who have regular screenings with normal results can stop after age 65. Talk about this with your provider.  Breast cancer scan (mammogram): If you've ever had breasts, begin having regular mammograms starting at age 40. This is a scan to check for breast cancer.  Colon cancer screening: It is important to start screening for colon cancer at age 45.  Have a colonoscopy test every 10 years (or more often if you're at risk) Or, ask your provider about stool tests like a FIT test every year or Cologuard test every 3 years.  To learn more about your testing options, visit: https://www.Hyperink/239404.pdf.  For help making a decision, visit: https://bit.ly/xv75721.  Prostate cancer screening test: If you have a prostate and are age 55 to 69, ask your provider if you would benefit from a yearly prostate cancer screening test.  Lung cancer screening: If you are a current or former smoker age 50 to 80, ask your care team if ongoing lung cancer screenings are right for you.  For informational purposes only. Not to replace the advice of your health care provider. Copyright   2023 [facility]. All rights reserved. Clinically reviewed by the [facility]itions Program. [employer] 474424 - REV 01/24.      Start Jardiance to help with congestive heart failure and  prediabetes.  Expect a call to schedule with diabetes education to help avoid diabetes.

## 2024-03-14 NOTE — PROGRESS NOTES
Preventive Care Visit  [facility]  Colleen Marroquin MD, Family Medicine  Mar 14, 2024      Assessment & Plan     (Z00.00) Encounter for Medicare annual wellness exam  (primary encounter diagnosis)  Comment: preventive needs reviewed   Plan: see orders in Epic.     (Z74.09) Impaired functional mobility, balance, gait, and endurance  Comment: increased balance and anxiety about falling  Plan: Physical Therapy  Referral        Refer to PT for gait training/fall preventiion    (E83.52) Hypercalcemia  Comment: new on recent labs  Plan: Vitamin D Deficiency, Parathyroid Hormone         Intact, Basic metabolic panel  (Ca, Cl, CO2,         Creat, Gluc, K, Na, BUN)        Recheck today    (R73.03) Prediabetes  Comment: A1c in prediabetic range  Plan: Adult Diabetes Education  Referral,         empagliflozin (JARDIANCE) 10 MG TABS tablet        Refer to diab ed, start jardiance for chf,     (F32.3) Major depressive disorder, single episode, severe with psychotic features (H)  (F41.9) Anxiety  Comment: stable and doing well   Plan: ARIPiprazole (ABILIFY) 5 MG tablet, gabapentin         (NEURONTIN) 300 MG capsule         Refill x 6 months       (E78.5) Hyperlipidemia with target LDL less than 130  Comment: stable  Plan: atorvastatin (LIPITOR) 10 MG tablet        Refill x 1 yr     (I50.32) Chronic heart failure with preserved ejection fraction (H)  Comment: mainly diastolic dysfunction  Plan: furosemide (LASIX) 40 MG tablet, empagliflozin         (JARDIANCE) 10 MG TABS tablet        Continue furosemide  Add jardiance for protection and to address prediabetes    (I10) Hypertension goal BP (blood pressure) < 140/90  Comment: to goal  Plan: furosemide (LASIX) 40 MG tablet, losartan         (COZAAR) 50 MG tablet         Refill x 6 months no change to meds    (E66.01,  Z68.41) Class 3 severe obesity due to excess calories with serious comorbidity and body mass index (BMI) of 40.0 to 44.9 in  "adult (H)  Comment: trending up  Plan: hope to see wt trend down with diab ed  Abilify liking adding to wt.              BMI  Estimated body mass index is 42.23 kg/m  as calculated from the following:    Height as of this encounter: 1.626 m (5' 4\").    Weight as of this encounter: 111.6 kg (246 lb).   Weight management plan: Discussed healthy diet and exercise guidelines    Counseling  Appropriate preventive services were discussed with this patient, including applicable screening as appropriate for fall prevention, nutrition, physical activity, Tobacco-use cessation, weight loss and cognition.  Checklist reviewing preventive services available has been given to the patient.  Reviewed patient's diet, addressing concerns and/or questions.   She is at risk for lack of exercise and has been provided with information to increase physical activity for the benefit of her well-being.   The patient was instructed to see the dentist every 6 months.       See Patient Instructions    Subjective   Lindsay is a 68 year old, presenting for the following:  Physical        3/14/2024     1:42 PM   Additional Questions   Roomed by [facility]         Health Care Directive  Patient does not have a Health Care Directive or Living Will: Discussed advance care planning with patient; information given to patient to review.    HPI  Increased balance issues, using a cane for ambulation.              3/14/2024   General Health   How would you rate your overall physical health? Good   Feel stress (tense, anxious, or unable to sleep) Not at all         3/14/2024   Nutrition   Diet: Low salt    Low fat/cholesterol         3/14/2024   Exercise   Days per week of moderate/strenous exercise 2 days   (!) EXERCISE CONCERN      3/14/2024   Social Factors   Frequency of gathering with friends or relatives Once a week   Worry food won't last until get money to buy more No   Food not last or not have enough money for food? No   Do you have housing?  Yes   Are " you worried about losing your housing? No   Lack of transportation? No   Unable to get utilities (heat,electricity)? No         3/14/2024   Activities of Daily Living- Home Safety   Needs help with the following daily activites None of the above   Safety concerns in the home None of the above         3/14/2024   Dental   Dentist two times every year? (!) NO         3/14/2024   Hearing Screening   Hearing concerns? None of the above         3/14/2024   Driving Risk Screening   Patient/family members have concerns about driving No         3/14/2024   General Alertness/Fatigue Screening   Have you been more tired than usual lately? No         3/14/2024   Urinary Incontinence Screening   Bothered by leaking urine in past 6 months No          Today's PHQ-9 Score:       3/14/2024     1:33 PM   PHQ-9 SCORE   PHQ-9 Total Score MyChart 2 (Minimal depression)   PHQ-9 Total Score 2         3/14/2024   Substance Use   Alcohol more than 3/day or more than 7/wk Not Applicable   Do you have a current opioid prescription? No   How severe/bad is pain from 1 to 10? 0/10 (No Pain)   Do you use any other substances recreationally? No     Social History     Tobacco Use    Smoking status: Never    Smokeless tobacco: Never    Tobacco comments:     no smokers in the [facility]   Vaping Use    Vaping Use: Never used   Substance Use Topics    Alcohol use: Yes     Comment: occasional    Drug use: No           3/14/2024   LAST FHS-7 RESULTS   1st degree relative breast or ovarian cancer Yes   Any relative bilateral breast cancer No   Any male have breast cancer No   Any ONE woman have BOTH breast AND ovarian cancer No   Any woman with breast cancer before 50yrs No   2 or more relatives with breast AND/OR ovarian cancer Yes   2 or more relatives with breast AND/OR bowel cancer No        Mammogram Screening - Mammogram every 1-2 years updated in Health Maintenance based on mutual decision making    G 2 P 2   No LMP recorded. Patient is  postmenopausal.     Fasting: No   Td: tdap 1/2019       Flu: 12/2023      Covid: 12/2023      Shingrix: done      PPV: done      RSV: 12/2023      NO - age 65 - see link Cervical Cytology Screening Guidelines               Cholesterol:   Lab Results   Component Value Date    CHOL 169 03/11/2024    CHOL 185 03/15/2021     Lab Results   Component Value Date    HDL 54 03/11/2024    HDL 69 03/15/2021     Lab Results   Component Value Date    LDL 85 03/11/2024    LDL 99 03/15/2021     Lab Results   Component Value Date    TRIG 151 03/11/2024    TRIG 87 03/15/2021     Lab Results   Component Value Date    CHOLHDLRATIO 2.5 09/16/2015         MMG: 3/2024  Dexa:  10/2022     Flex/colo: 3/2021 q10y scope      Seat Belt: Yes    Sunscreen use: Yes   Calcium Intake: adeq  Health Care Directive: No  Sexually Active: No    Current contraception: none  History of abnormal Pap smear: No  Family history of colon/breast/ovarian cancer: Yes: see [facility]  Regular self breast exam: Yes  History of abnormal mammogram: No      ASCVD Risk   The 10-year ASCVD risk score (Abdelrahman MICHELLE, et al., 2019) is: 11.4%    Values used to calculate the score:      Age: 68 years      Sex: Female      Is Non- : No      Diabetic: No      Tobacco smoker: No      Systolic Blood Pressure: 132 mmHg      Is BP treated: Yes      HDL Cholesterol: 54 mg/dL      Total Cholesterol: 169 mg/dL            Reviewed and updated as needed this visit by Provider   Tobacco  Allergies  Meds  Problems  Med Hx  Surg Hx  Fam Hx            Labs reviewed in EPIC  BP Readings from Last 3 Encounters:   03/14/24 132/78   12/16/23 138/78   06/15/23 (!) 167/91    Wt Readings from Last 3 Encounters:   03/14/24 111.6 kg (246 lb)   06/15/23 108 kg (238 lb)   03/21/23 108 kg (238 lb)                  Patient Active Problem List   Diagnosis    Hyperlipidemia with target LDL less than 130    Cervical polyp    Hypertension goal BP (blood pressure) < 140/90     Class 3 severe obesity due to excess calories with serious comorbidity and body mass index (BMI) of 40.0 to 44.9 in adult (H)    Post-menopause bleeding    Major depressive disorder, single episode, severe with psychotic features (H)    Abnormal uterine bleeding due to endometrial polyp    Chronic heart failure with preserved ejection fraction (H)     Past Surgical History:   Procedure Laterality Date    CHOLECYSTECTOMY      CHOLECYSTECTOMY, LAPOROSCOPIC           OPERATIVE HYSTEROSCOPY WITH MORCELLATOR N/A 3/23/2021    Procedure: Diagnostic Hysteroscopy with biopsy;  Surgeon: Deny Tapia MD;  Location:  OR    OTHER SURGICAL HISTORY      uterine polypectomy     SHOULDER SURGERY Right 3/29/2021    Procedure: CLOSED REDUCTION, SHOULDER;  Surgeon: Melo Mckeon MD;  [place] [facility] [place]: [place]       Social History     Tobacco Use    Smoking status: Never    Smokeless tobacco: Never    Tobacco comments:     no smokers in the [facility]   Substance Use Topics    Alcohol use: Yes     Comment: occasional     Family History   Problem Relation Age of Onset    Breast Cancer Mother     Hypertension Mother     Lipids Mother     Cancer Father         kidney cancer    Diabetes Paternal Grandfather         adult    Breast Cancer Sister     Cancer Sister     Heart Disease Maternal Aunt     Cancer Sister         pancreatic         Current Outpatient Medications   Medication Sig Dispense Refill    ARIPiprazole (ABILIFY) 5 MG tablet Take 1 tablet (5 mg) by mouth every morning 90 tablet 1    aspirin 81 MG EC tablet Take 81 mg by mouth daily With a meal       atorvastatin (LIPITOR) 10 MG tablet Take 1 tablet (10 mg) by mouth daily 90 tablet 3    CALCIUM 600 + D 600-200 MG-UNIT OR TABS 2 pill a day  3    CENTRUM SILVER OR TABS ONE DAILY 0 0    empagliflozin (JARDIANCE) 10 MG TABS tablet Take 1 tablet (10 mg) by mouth daily 30 tablet 1    furosemide (LASIX) 40 MG tablet Take 1 tablet (40 mg) by mouth  daily 90 tablet 1    gabapentin (NEURONTIN) 300 MG capsule Take 1 capsule (300 mg) by mouth 3 times daily 270 capsule 1    latanoprost (XALATAN) 0.005 % ophthalmic solution Instill 1 drop Both Eyes every evening.*      losartan (COZAAR) 50 MG tablet Take 1 tablet (50 mg) by mouth daily 90 tablet 1    sertraline (ZOLOFT) 50 MG tablet Take 2.5 tablets (125 mg) by mouth daily 225 tablet 1     Current providers sharing in care for this patient include:  Patient Care Team:  Colleen Marroquin MD as PCP - General (Family Medicine)  Colleen Marroquin MD as Assigned PCP  Michael Alfredo MD as Assigned Musculoskeletal Provider  Colleen Marroquin MD as MD (Family Medicine)    The following health maintenance items are reviewed in Epic and correct as of today:  Health Maintenance   Topic Date Due    HF ACTION PLAN  Never done    ANNUAL REVIEW OF HM ORDERS  Never done    BMP  09/14/2024    PHQ-9  09/14/2024    CBC  10/21/2024    ALT  03/11/2025    LIPID  03/11/2025    MEDICARE ANNUAL WELLNESS VISIT  03/14/2025    FALL RISK ASSESSMENT  03/14/2025    MAMMO SCREENING  03/14/2026    GLUCOSE  03/14/2027    DEXA  10/11/2027    ADVANCE CARE PLANNING  03/06/2028    DTAP/TDAP/TD IMMUNIZATION (4 - Td or Tdap) 01/30/2029    COLORECTAL CANCER SCREENING  03/08/2031    TSH W/FREE T4 REFLEX  Completed    HEPATITIS C SCREENING  Completed    DEPRESSION ACTION PLAN  Completed    INFLUENZA VACCINE  Completed    Pneumococcal Vaccine: 65+ Years  Completed    ZOSTER IMMUNIZATION  Completed    RSV VACCINE (Pregnancy & 60+)  Completed    COVID-19 Vaccine  Completed    IPV IMMUNIZATION  Aged Out    HPV IMMUNIZATION  Aged Out    MENINGITIS IMMUNIZATION  Aged Out    RSV MONOCLONAL ANTIBODY  Aged Out         Review of Systems  Constitutional, neuro, ENT, endocrine, pulmonary, cardiac, gastrointestinal, genitourinary, musculoskeletal, integument and psychiatric systems are negative, except as otherwise noted.     Objective    Exam  BP  "132/78   Pulse 75   Temp 97.8  F (36.6  C) (Oral)   Resp 18   Ht 1.626 m (5' 4\")   Wt 111.6 kg (246 lb)   SpO2 94%   BMI 42.23 kg/m     Estimated body mass index is 42.23 kg/m  as calculated from the following:    Height as of this encounter: 1.626 m (5' 4\").    Weight as of this encounter: 111.6 kg (246 lb).    Physical Exam  GENERAL: alert and no distress  EYES: Eyes grossly normal to inspection, PERRL and conjunctivae and sclerae normal  HENT: ear canals and TM's normal, nose and mouth without ulcers or lesions  NECK: no adenopathy, no asymmetry, masses, or scars  RESP: lungs clear to auscultation - no rales, rhonchi or wheezes  CV: regular rate and rhythm, normal S1 S2, no S3 or S4, no murmur, click or rub, no peripheral edema  ABDOMEN: soft, nontender, no hepatosplenomegaly, no masses and bowel sounds normal  MS: no gross musculoskeletal defects noted, no edema  SKIN: no suspicious lesions or rashes  NEURO: Normal strength and tone, mentation intact and speech normal  PSYCH: mentation appears normal, affect normal/bright        3/14/2024   Mini Cog   Mini-Cog Not Completed (choose reason) Patient declines   Normal cognition based on my direct observation during interview and exam.     Patient declines, there are NO concerns for cognitive deficits.           Signed Electronically by: Colleen Marroquin MD    Answers submitted by the patient for this visit:  Patient Health Questionnaire (Submitted on 3/14/2024)  If you checked off any problems, how difficult have these problems made it for you to do your work, take care of things at home, or get along with other people?: Not difficult at all  PHQ9 TOTAL SCORE: 2    "

## 2024-03-15 LAB
ANION GAP SERPL CALCULATED.3IONS-SCNC: 11 MMOL/L (ref 7–15)
BUN SERPL-MCNC: 19.5 MG/DL (ref 8–23)
CALCIUM SERPL-MCNC: 9.5 MG/DL (ref 8.8–10.2)
CHLORIDE SERPL-SCNC: 103 MMOL/L (ref 98–107)
CREAT SERPL-MCNC: 0.66 MG/DL (ref 0.51–0.95)
DEPRECATED HCO3 PLAS-SCNC: 23 MMOL/L (ref 22–29)
EGFRCR SERPLBLD CKD-EPI 2021: >90 ML/MIN/1.73M2
GLUCOSE SERPL-MCNC: 105 MG/DL (ref 70–99)
POTASSIUM SERPL-SCNC: 4 MMOL/L (ref 3.4–5.3)
PTH-INTACT SERPL-MCNC: 59 PG/ML (ref 15–65)
SODIUM SERPL-SCNC: 137 MMOL/L (ref 135–145)
VIT D+METAB SERPL-MCNC: 15 NG/ML (ref 20–50)

## 2024-03-17 PROBLEM — E66.813 CLASS 3 SEVERE OBESITY DUE TO EXCESS CALORIES WITH SERIOUS COMORBIDITY AND BODY MASS INDEX (BMI) OF 40.0 TO 44.9 IN ADULT (H): Status: ACTIVE | Noted: 2017-01-24

## 2024-03-17 RX ORDER — ERGOCALCIFEROL 1.25 MG/1
50000 CAPSULE, LIQUID FILLED ORAL WEEKLY
Qty: 8 CAPSULE | Refills: 0 | Status: SHIPPED | OUTPATIENT
Start: 2024-03-17 | End: 2024-05-06

## 2024-04-11 ENCOUNTER — TELEPHONE (OUTPATIENT)
Dept: FAMILY MEDICINE | Facility: CLINIC | Age: 69
End: 2024-04-11
Payer: COMMERCIAL

## 2024-04-11 DIAGNOSIS — R73.03 PREDIABETES: ICD-10-CM

## 2024-04-11 DIAGNOSIS — I50.32 CHRONIC HEART FAILURE WITH PRESERVED EJECTION FRACTION (H): ICD-10-CM

## 2024-04-11 NOTE — TELEPHONE ENCOUNTER
FYI - Status Update    Who is Calling: patient    Update: Patient is not having any side effects from the jardiance, and is going well. She will need a refill on this. Pharmacy and medication pended.    Does caller want a call/response back: Yes     Could we [facility] this information to you [facility] or would you prefer to receive a phone call?:   Patient would prefer a phone call   Okay to leave a detailed message?: Yes at Cell number on file:    Telephone Information:   Mobile [phone]     [facility]

## 2024-05-21 ENCOUNTER — TRANSFERRED RECORDS (OUTPATIENT)
Dept: HEALTH INFORMATION MANAGEMENT | Facility: CLINIC | Age: 69
End: 2024-05-21
Payer: COMMERCIAL

## 2024-07-16 ENCOUNTER — VIRTUAL VISIT (OUTPATIENT)
Dept: EDUCATION SERVICES | Facility: CLINIC | Age: 69
End: 2024-07-16
Attending: FAMILY MEDICINE
Payer: COMMERCIAL

## 2024-07-16 DIAGNOSIS — R73.03 PREDIABETES: Primary | ICD-10-CM

## 2024-07-16 PROCEDURE — 97802 MEDICAL NUTRITION INDIV IN: CPT | Mod: 93 | Performed by: DIETITIAN, REGISTERED

## 2024-07-16 NOTE — LETTER
7/16/2024         RE: Lala Yuen  [address]  [facility] [address]        Dear Colleague,    Thank you for referring your patient, Lala Yuen, to the [facility]. Please see a copy of my visit note below.    Medical Nutrition Therapy  Visit Type:Initial assessment and intervention  Lala Yuen presents today for MNT and education related to prediabetes.   She is accompanied by self.     ASSESSMENT/PLAN:   Initial visit.New dx - prediabetes. Reports good support:niece. Fam hx:brother has prediabetes  Has been trying to follow mediterranean diet  3 meals daily. Has been trying to follow mediterranean diet. Tries to drink 4-5 (16 oz bottles) daily. Food recall: BF: eggs, banana  L: carrots, banana, lime yogurt, D:didn't remember.  Education/discussion: prediabetes basics: wt loss,eating healthfully (plate method, healthy choices and portions, CHO Vs non CHO foods) and daily activity.   Limited activity    Patient instructions:    - Eat healthy & well balanced meals: use plate method  - Plan ahead for healthy meals and snacks.  - Watch portions of carbs, include more non starchy veg and lean protein in meals.  - Limit fried and high calorie foods or anything with added sugar  - Drink more water.  - Regular physical activity as able/safe     EXERCISE: no regular exercise program  SOCIO/ECONOMIC:   Lives with: self, niece  MEDICATIONS:  Current Outpatient Medications   Medication Sig Dispense Refill     ARIPiprazole (ABILIFY) 5 MG tablet Take 1 tablet (5 mg) by mouth every morning 90 tablet 1     aspirin 81 MG EC tablet Take 81 mg by mouth daily With a meal        atorvastatin (LIPITOR) 10 MG tablet Take 1 tablet (10 mg) by mouth daily 90 tablet 3     CALCIUM 600 + D 600-200 MG-UNIT OR TABS 2 pill a day  3     CENTRUM SILVER OR TABS ONE DAILY 0 0     empagliflozin (JARDIANCE) 10 MG TABS tablet Take 1 tablet (10 mg) by mouth daily 90 tablet 1     furosemide (LASIX) 40  MG tablet Take 1 tablet (40 mg) by mouth daily 90 tablet 1     gabapentin (NEURONTIN) 300 MG capsule Take 1 capsule (300 mg) by mouth 3 times daily 270 capsule 1     latanoprost (XALATAN) 0.005 % ophthalmic solution Instill 1 drop Both Eyes every evening.*       losartan (COZAAR) 50 MG tablet Take 1 tablet (50 mg) by mouth daily 90 tablet 1     sertraline (ZOLOFT) 50 MG tablet Take 2.5 tablets (125 mg) by mouth daily 225 tablet 1     No current facility-administered medications for this visit.       LABS:  Lab Results   Component Value Date     03/14/2024     04/06/2021      Lab Results   Component Value Date    POTASSIUM 4.0 03/14/2024    POTASSIUM 4.1 02/03/2023    POTASSIUM 3.6 04/06/2021     Lab Results   Component Value Date    CHLORIDE 103 03/14/2024    CHLORIDE 107 02/03/2023    CHLORIDE 108 04/06/2021     Lab Results   Component Value Date    JOSESITO 9.5 03/14/2024    JOSESITO 9.1 04/06/2021     Lab Results   Component Value Date    CO2 23 03/14/2024    CO2 28 02/03/2023    CO2 28 04/06/2021     Lab Results   Component Value Date    BUN 19.5 03/14/2024    BUN 20 02/03/2023    BUN 21 04/06/2021     Lab Results   Component Value Date    CR 0.66 03/14/2024    CR 0.66 04/06/2021     Lab Results   Component Value Date     03/14/2024    GLC 92 02/03/2023    GLC 89 04/06/2021     Lab Results   Component Value Date    LDL 85 03/11/2024    LDL 99 03/15/2021     HDL Cholesterol   Date Value Ref Range Status   03/15/2021 69 >49 mg/dL Final     Direct Measure HDL   Date Value Ref Range Status   03/11/2024 54 >=50 mg/dL Final   ]  GFR Estimate   Date Value Ref Range Status   03/14/2024 >90 >60 mL/min/1.73m2 Final   04/06/2021 >90 >60 mL/min/[1.73_m2] Final     Comment:     Non  GFR Calc  Starting 12/18/2018, serum creatinine based estimated GFR (eGFR) will be   calculated using the Chronic Kidney Disease Epidemiology Collaboration   (CKD-EPI) equation.       Lab Results   Component Value Date     "CR 0.66 03/14/2024    CR 0.66 04/06/2021     No results found for: \"MICROALBUMIN\"    ANTHROPOMETRICS:  Vitals: There were no vitals taken for this visit.  There is no height or weight on file to calculate BMI.      Wt Readings from Last 5 Encounters:   03/14/24 111.6 kg (246 lb)   06/15/23 108 kg (238 lb)   03/21/23 108 kg (238 lb)   03/06/23 108.1 kg (238 lb 6.4 oz)   03/01/22 108 kg (238 lb)       NUTRITION INTERVENTION:  Education given to support: general nutrition guidelines, labeling, fiber, behavior modification, and portion control    PATIENT'S BEHAVIOR CHANGE GOALS:   See Patient Instructions for patient stated behavior change goals. AVS was printed and given to patient at today's appointment.    MONITOR / EVALUATE:  RD will monitor/evaluate:  Food and nutrition knowledge / skills  Readiness to change nutrition-related behaviors  FOLLOW-UP:  2 months (or sooner if concerns)  Time spent in minutes: 50 mins  Encounter: Individual       "

## 2024-07-16 NOTE — PROGRESS NOTES
Medical Nutrition Therapy  Visit Type:Initial assessment and intervention  Lala Yuen presents today for MNT and education related to prediabetes.   She is accompanied by self.     ASSESSMENT/PLAN:   Initial visit.New dx - prediabetes. Reports good support:niece. Fam hx:brother has prediabetes  Has been trying to follow mediterranean diet  3 meals daily. Has been trying to follow mediterranean diet. Tries to drink 4-5 (16 oz bottles) daily. Food recall: BF: eggs, banana  L: carrots, banana, lime yogurt, D:didn't remember.  Education/discussion: prediabetes basics: wt loss,eating healthfully (plate method, healthy choices and portions, CHO Vs non CHO foods) and daily activity.   Limited activity    Patient instructions:    - Eat healthy & well balanced meals: use plate method  - Plan ahead for healthy meals and snacks.  - Watch portions of carbs, include more non starchy veg and lean protein in meals.  - Limit fried and high calorie foods or anything with added sugar  - Drink more water.  - Regular physical activity as able/safe     EXERCISE: no regular exercise program  SOCIO/ECONOMIC:   Lives with: self, niece  MEDICATIONS:  Current Outpatient Medications   Medication Sig Dispense Refill    ARIPiprazole (ABILIFY) 5 MG tablet Take 1 tablet (5 mg) by mouth every morning 90 tablet 1    aspirin 81 MG EC tablet Take 81 mg by mouth daily With a meal       atorvastatin (LIPITOR) 10 MG tablet Take 1 tablet (10 mg) by mouth daily 90 tablet 3    CALCIUM 600 + D 600-200 MG-UNIT OR TABS 2 pill a day  3    CENTRUM SILVER OR TABS ONE DAILY 0 0    empagliflozin (JARDIANCE) 10 MG TABS tablet Take 1 tablet (10 mg) by mouth daily 90 tablet 1    furosemide (LASIX) 40 MG tablet Take 1 tablet (40 mg) by mouth daily 90 tablet 1    gabapentin (NEURONTIN) 300 MG capsule Take 1 capsule (300 mg) by mouth 3 times daily 270 capsule 1    latanoprost (XALATAN) 0.005 % ophthalmic solution Instill 1 drop Both Eyes every evening.*       "losartan (COZAAR) 50 MG tablet Take 1 tablet (50 mg) by mouth daily 90 tablet 1    sertraline (ZOLOFT) 50 MG tablet Take 2.5 tablets (125 mg) by mouth daily 225 tablet 1     No current facility-administered medications for this visit.       LABS:  Lab Results   Component Value Date     03/14/2024     04/06/2021      Lab Results   Component Value Date    POTASSIUM 4.0 03/14/2024    POTASSIUM 4.1 02/03/2023    POTASSIUM 3.6 04/06/2021     Lab Results   Component Value Date    CHLORIDE 103 03/14/2024    CHLORIDE 107 02/03/2023    CHLORIDE 108 04/06/2021     Lab Results   Component Value Date    JOSESITO 9.5 03/14/2024    JOSESITO 9.1 04/06/2021     Lab Results   Component Value Date    CO2 23 03/14/2024    CO2 28 02/03/2023    CO2 28 04/06/2021     Lab Results   Component Value Date    BUN 19.5 03/14/2024    BUN 20 02/03/2023    BUN 21 04/06/2021     Lab Results   Component Value Date    CR 0.66 03/14/2024    CR 0.66 04/06/2021     Lab Results   Component Value Date     03/14/2024    GLC 92 02/03/2023    GLC 89 04/06/2021     Lab Results   Component Value Date    LDL 85 03/11/2024    LDL 99 03/15/2021     HDL Cholesterol   Date Value Ref Range Status   03/15/2021 69 >49 mg/dL Final     Direct Measure HDL   Date Value Ref Range Status   03/11/2024 54 >=50 mg/dL Final   ]  GFR Estimate   Date Value Ref Range Status   03/14/2024 >90 >60 mL/min/1.73m2 Final   04/06/2021 >90 >60 mL/min/[1.73_m2] Final     Comment:     Non  GFR Calc  Starting 12/18/2018, serum creatinine based estimated GFR (eGFR) will be   calculated using the Chronic Kidney Disease Epidemiology Collaboration   (CKD-EPI) equation.       Lab Results   Component Value Date    CR 0.66 03/14/2024    CR 0.66 04/06/2021     No results found for: \"MICROALBUMIN\"    ANTHROPOMETRICS:  Vitals: There were no vitals taken for this visit.  There is no height or weight on file to calculate BMI.      Wt Readings from Last 5 Encounters:   03/14/24 " 111.6 kg (246 lb)   06/15/23 108 kg (238 lb)   03/21/23 108 kg (238 lb)   03/06/23 108.1 kg (238 lb 6.4 oz)   03/01/22 108 kg (238 lb)       NUTRITION INTERVENTION:  Education given to support: general nutrition guidelines, labeling, fiber, behavior modification, and portion control    PATIENT'S BEHAVIOR CHANGE GOALS:   See Patient Instructions for patient stated behavior change goals. AVS was printed and given to patient at today's appointment.    MONITOR / EVALUATE:  RD will monitor/evaluate:  Food and nutrition knowledge / skills  Readiness to change nutrition-related behaviors  FOLLOW-UP:  2 months (or sooner if concerns)  Time spent in minutes: 50 mins  Encounter: Individual

## 2024-08-30 DIAGNOSIS — F32.3 MAJOR DEPRESSIVE DISORDER, SINGLE EPISODE, SEVERE WITH PSYCHOTIC FEATURES (H): ICD-10-CM

## 2024-08-30 DIAGNOSIS — F41.9 ANXIETY: ICD-10-CM

## 2024-08-30 NOTE — LETTER
August 30, 2024    Lala Yuen  [address]  [facility] [address]    Odell Montoya,     I received a refill request for sertraline.  I have sent a 6 month supply to your pharmacy.    You are due for labs now; I have ordered the tests, please call to make a non-fasting lab appointment at [facility] lab.      You are due now for the mood questionnaires. I have included them with this message; please complete them and send them back.      Colleen Marroquin MD

## 2024-08-30 NOTE — TELEPHONE ENCOUNTER
This has been mailed to patient with phq9/[id number] & return envelope.     Ness LOZADA,    [facility]

## 2024-08-30 NOTE — TELEPHONE ENCOUNTER
Please mail letter with phq9/gad7 and return envelope    Odell Montoya,    I received a refill request for sertraline.  I have sent a 6 month supply to your pharmacy.    You are due for labs now; I have ordered the tests, please call to make a non-fasting lab appointment at [facility] lab.     You are due now for the mood questionnaires. I have included them with this message; please complete them and send them back.     Colleen Marroquin MD

## 2024-09-09 ASSESSMENT — ANXIETY QUESTIONNAIRES
GAD7 TOTAL SCORE: 0
3. WORRYING TOO MUCH ABOUT DIFFERENT THINGS: NOT AT ALL
5. BEING SO RESTLESS THAT IT IS HARD TO SIT STILL: NOT AT ALL
IF YOU CHECKED OFF ANY PROBLEMS ON THIS QUESTIONNAIRE, HOW DIFFICULT HAVE THESE PROBLEMS MADE IT FOR YOU TO DO YOUR WORK, TAKE CARE OF THINGS AT HOME, OR GET ALONG WITH OTHER PEOPLE: NOT DIFFICULT AT ALL
1. FEELING NERVOUS, ANXIOUS, OR ON EDGE: NOT AT ALL
GAD7 TOTAL SCORE: 0
7. FEELING AFRAID AS IF SOMETHING AWFUL MIGHT HAPPEN: NOT AT ALL
2. NOT BEING ABLE TO STOP OR CONTROL WORRYING: NOT AT ALL
6. BECOMING EASILY ANNOYED OR IRRITABLE: NOT AT ALL

## 2024-09-09 ASSESSMENT — PATIENT HEALTH QUESTIONNAIRE - PHQ9
5. POOR APPETITE OR OVEREATING: NOT AT ALL
SUM OF ALL RESPONSES TO PHQ QUESTIONS 1-9: 0

## 2024-09-30 ENCOUNTER — VIRTUAL VISIT (OUTPATIENT)
Dept: EDUCATION SERVICES | Facility: CLINIC | Age: 69
End: 2024-09-30
Payer: COMMERCIAL

## 2024-09-30 DIAGNOSIS — R73.03 PREDIABETES: Primary | ICD-10-CM

## 2024-09-30 PROCEDURE — 97803 MED NUTRITION INDIV SUBSEQ: CPT | Mod: GZ | Performed by: DIETITIAN, REGISTERED

## 2024-09-30 NOTE — LETTER
9/30/2024         RE: Lala Yuen  [address]  [facility] [address]        Dear Colleague,    Thank you for referring your patient, Lala Yuen, to the [facility]. Please see a copy of my visit note below.    Medical Nutrition Therapy for Diabetes  Visit Type:Reassessment and intervention  Lala Yuen presents today for MNT and education related to prediabetes.   She is accompanied by self.     ASSESSMENT:   Follow up visit. Prediabetes. Pt has been trying to follow a mediterranean diet  Consumes 3 meals daily. Uses plate method for portion control. Has also been trying to focus on planning ahead for meals and feels that helps. Also doing more chicken and vegetables. Drinking more water. Likes to get fresh produce from [employer]. Food recall: BF: honey nut cherrios, banana  L: turkey and cheese sandwich D:popcorn  Continues to take 10 mg Jardiance daily. States, PCP started it for CV benefits mainly.  Education/reviewed: prediabetes basics, eating healthfully (plate method, healthy choices and portions, CHO Vs non CHO foods)  Limited activity  Eats out: frequently     Previous diet education:  Yes   EXERCISE: minimal exercise. Needs to get a knee replacement     Patient's most recent   Lab Results   Component Value Date    A1C 5.8 03/11/2024    is not meeting goal of  <5.6    MEDICATIONS:  Current Outpatient Medications   Medication Sig Dispense Refill     ARIPiprazole (ABILIFY) 5 MG tablet Take 1 tablet (5 mg) by mouth every morning 90 tablet 1     aspirin 81 MG EC tablet Take 81 mg by mouth daily With a meal        atorvastatin (LIPITOR) 10 MG tablet Take 1 tablet (10 mg) by mouth daily 90 tablet 3     CALCIUM 600 + D 600-200 MG-UNIT OR TABS 2 pill a day  3     CENTRUM SILVER OR TABS ONE DAILY 0 0     empagliflozin (JARDIANCE) 10 MG TABS tablet Take 1 tablet (10 mg) by mouth daily 90 tablet 1     furosemide (LASIX) 40 MG tablet Take 1 tablet (40 mg) by  "mouth daily 90 tablet 1     gabapentin (NEURONTIN) 300 MG capsule Take 1 capsule (300 mg) by mouth 3 times daily 270 capsule 1     latanoprost (XALATAN) 0.005 % ophthalmic solution Instill 1 drop Both Eyes every evening.*       losartan (COZAAR) 50 MG tablet Take 1 tablet (50 mg) by mouth daily 90 tablet 1     sertraline (ZOLOFT) 50 MG tablet Take 2.5 tablets (125 mg) by mouth daily 225 tablet 1     No current facility-administered medications for this visit.       LABS:  Lab Results   Component Value Date    A1C 5.8 03/11/2024     Lab Results   Component Value Date     03/14/2024    GLC 92 02/03/2023    GLC 89 04/06/2021     Lab Results   Component Value Date    LDL 85 03/11/2024    LDL 99 03/15/2021     HDL Cholesterol   Date Value Ref Range Status   03/15/2021 69 >49 mg/dL Final     Direct Measure HDL   Date Value Ref Range Status   03/11/2024 54 >=50 mg/dL Final   ]  GFR Estimate   Date Value Ref Range Status   03/14/2024 >90 >60 mL/min/1.73m2 Final   04/06/2021 >90 >60 mL/min/[1.73_m2] Final     Comment:     Non  GFR Calc  Starting 12/18/2018, serum creatinine based estimated GFR (eGFR) will be   calculated using the Chronic Kidney Disease Epidemiology Collaboration   (CKD-EPI) equation.       Lab Results   Component Value Date    CR 0.66 03/14/2024    CR 0.66 04/06/2021     No results found for: \"MICROALBUMIN\"    ANTHROPOMETRICS:  Vitals: There were no vitals taken for this visit.  There is no height or weight on file to calculate BMI.      Wt Readings from Last 5 Encounters:   03/14/24 111.6 kg (246 lb)   06/15/23 108 kg (238 lb)   03/21/23 108 kg (238 lb)   03/06/23 108.1 kg (238 lb 6.4 oz)   03/01/22 108 kg (238 lb)       NUTRITION INTERVENTION:  Education given to support: general nutrition guidelines, fiber, and portion control  Motivational Interviewing    PATIENT'S BEHAVIOR CHANGE GOALS:   See Patient Instructions for patient stated behavior change goals. AVS was printed and given " to patient at today's appointment.    MONITOR / EVALUATE:  RD will monitor/evaluate:  Food and nutrition knowledge / skills  FOLLOW-UP:  Follow up with RD: 6 months/or sooner if concerns  Time spent in minutes: 40 mins  Encounter: Individual

## 2024-09-30 NOTE — PROGRESS NOTES
Medical Nutrition Therapy for Diabetes  Visit Type:Reassessment and intervention  Lala Yuen presents today for MNT and education related to prediabetes.   She is accompanied by self.     ASSESSMENT:   Follow up visit. Prediabetes. Pt has been trying to follow a mediterranean diet  Consumes 3 meals daily. Uses plate method for portion control. Has also been trying to focus on planning ahead for meals and feels that helps. Also doing more chicken and vegetables. Drinking more water. Likes to get fresh produce from [employer]. Food recall: BF: honey nut cherrios, banana  L: turkey and cheese sandwich D:popcorn  Continues to take 10 mg Jardiance daily. States, PCP started it for CV benefits mainly.  Education/reviewed: prediabetes basics, eating healthfully (plate method, healthy choices and portions, CHO Vs non CHO foods)  Limited activity  Eats out: frequently     Previous diet education:  Yes   EXERCISE: minimal exercise. Needs to get a knee replacement     Patient's most recent   Lab Results   Component Value Date    A1C 5.8 03/11/2024    is not meeting goal of  <5.6    MEDICATIONS:  Current Outpatient Medications   Medication Sig Dispense Refill    ARIPiprazole (ABILIFY) 5 MG tablet Take 1 tablet (5 mg) by mouth every morning 90 tablet 1    aspirin 81 MG EC tablet Take 81 mg by mouth daily With a meal       atorvastatin (LIPITOR) 10 MG tablet Take 1 tablet (10 mg) by mouth daily 90 tablet 3    CALCIUM 600 + D 600-200 MG-UNIT OR TABS 2 pill a day  3    CENTRUM SILVER OR TABS ONE DAILY 0 0    empagliflozin (JARDIANCE) 10 MG TABS tablet Take 1 tablet (10 mg) by mouth daily 90 tablet 1    furosemide (LASIX) 40 MG tablet Take 1 tablet (40 mg) by mouth daily 90 tablet 1    gabapentin (NEURONTIN) 300 MG capsule Take 1 capsule (300 mg) by mouth 3 times daily 270 capsule 1    latanoprost (XALATAN) 0.005 % ophthalmic solution Instill 1 drop Both Eyes every evening.*      losartan (COZAAR) 50 MG tablet Take 1  "tablet (50 mg) by mouth daily 90 tablet 1    sertraline (ZOLOFT) 50 MG tablet Take 2.5 tablets (125 mg) by mouth daily 225 tablet 1     No current facility-administered medications for this visit.       LABS:  Lab Results   Component Value Date    A1C 5.8 03/11/2024     Lab Results   Component Value Date     03/14/2024    GLC 92 02/03/2023    GLC 89 04/06/2021     Lab Results   Component Value Date    LDL 85 03/11/2024    LDL 99 03/15/2021     HDL Cholesterol   Date Value Ref Range Status   03/15/2021 69 >49 mg/dL Final     Direct Measure HDL   Date Value Ref Range Status   03/11/2024 54 >=50 mg/dL Final   ]  GFR Estimate   Date Value Ref Range Status   03/14/2024 >90 >60 mL/min/1.73m2 Final   04/06/2021 >90 >60 mL/min/[1.73_m2] Final     Comment:     Non  GFR Calc  Starting 12/18/2018, serum creatinine based estimated GFR (eGFR) will be   calculated using the Chronic Kidney Disease Epidemiology Collaboration   (CKD-EPI) equation.       Lab Results   Component Value Date    CR 0.66 03/14/2024    CR 0.66 04/06/2021     No results found for: \"MICROALBUMIN\"    ANTHROPOMETRICS:  Vitals: There were no vitals taken for this visit.  There is no height or weight on file to calculate BMI.      Wt Readings from Last 5 Encounters:   03/14/24 111.6 kg (246 lb)   06/15/23 108 kg (238 lb)   03/21/23 108 kg (238 lb)   03/06/23 108.1 kg (238 lb 6.4 oz)   03/01/22 108 kg (238 lb)       NUTRITION INTERVENTION:  Education given to support: general nutrition guidelines, fiber, and portion control  Motivational Interviewing    PATIENT'S BEHAVIOR CHANGE GOALS:   See Patient Instructions for patient stated behavior change goals. AVS was printed and given to patient at today's appointment.    MONITOR / EVALUATE:  RD will monitor/evaluate:  Food and nutrition knowledge / skills  FOLLOW-UP:  Follow up with RD: 6 months/or sooner if concerns  Time spent in minutes: 40 mins  Encounter: Individual   "

## 2024-10-03 DIAGNOSIS — I50.32 CHRONIC HEART FAILURE WITH PRESERVED EJECTION FRACTION (H): ICD-10-CM

## 2024-10-03 RX ORDER — EMPAGLIFLOZIN 10 MG/1
10 TABLET, FILM COATED ORAL DAILY
Qty: 90 TABLET | Refills: 1 | Status: SHIPPED | OUTPATIENT
Start: 2024-10-03

## 2024-10-07 ENCOUNTER — DOCUMENTATION ONLY (OUTPATIENT)
Dept: FAMILY MEDICINE | Facility: CLINIC | Age: 69
End: 2024-10-07
Payer: COMMERCIAL

## 2024-10-07 DIAGNOSIS — I50.32 CHRONIC HEART FAILURE WITH PRESERVED EJECTION FRACTION (H): Primary | ICD-10-CM

## 2024-10-07 NOTE — PROGRESS NOTES
Lala BRANDEN Yuen has an upcoming lab appointment:    Future Appointments   Date Time Provider Department Center   10/12/2024  1:00 PM AN LAB [facility]   4/1/2025 10:00 AM Fabiana Peterson RD [facility]       There is no order available. Please review and place either future orders or HMPO (Review of Health Maintenance Protocol Orders), as appropriate.    Health Maintenance Due   Topic    ANNUAL REVIEW OF HM ORDERS     BMP     CBC      Raoul GONZALEZT

## 2024-10-12 ENCOUNTER — LAB (OUTPATIENT)
Dept: LAB | Facility: CLINIC | Age: 69
End: 2024-10-12
Payer: COMMERCIAL

## 2024-10-12 DIAGNOSIS — I50.32 CHRONIC HEART FAILURE WITH PRESERVED EJECTION FRACTION (H): ICD-10-CM

## 2024-10-12 DIAGNOSIS — E55.9 VITAMIN D DEFICIENCY: ICD-10-CM

## 2024-10-12 LAB
ANION GAP SERPL CALCULATED.3IONS-SCNC: 12 MMOL/L (ref 7–15)
BUN SERPL-MCNC: 21.8 MG/DL (ref 8–23)
CALCIUM SERPL-MCNC: 9.7 MG/DL (ref 8.8–10.4)
CHLORIDE SERPL-SCNC: 105 MMOL/L (ref 98–107)
CREAT SERPL-MCNC: 0.63 MG/DL (ref 0.51–0.95)
EGFRCR SERPLBLD CKD-EPI 2021: >90 ML/MIN/1.73M2
ERYTHROCYTE [DISTWIDTH] IN BLOOD BY AUTOMATED COUNT: 14.5 % (ref 10–15)
GLUCOSE SERPL-MCNC: 109 MG/DL (ref 70–99)
HCO3 SERPL-SCNC: 23 MMOL/L (ref 22–29)
HCT VFR BLD AUTO: 46.4 % (ref 35–47)
HGB BLD-MCNC: 15.5 G/DL (ref 11.7–15.7)
MCH RBC QN AUTO: 28.9 PG (ref 26.5–33)
MCHC RBC AUTO-ENTMCNC: 33.4 G/DL (ref 31.5–36.5)
MCV RBC AUTO: 86 FL (ref 78–100)
PLATELET # BLD AUTO: 249 10E3/UL (ref 150–450)
POTASSIUM SERPL-SCNC: 3.6 MMOL/L (ref 3.4–5.3)
RBC # BLD AUTO: 5.37 10E6/UL (ref 3.8–5.2)
SODIUM SERPL-SCNC: 140 MMOL/L (ref 135–145)
VIT D+METAB SERPL-MCNC: 23 NG/ML (ref 20–50)
WBC # BLD AUTO: 8 10E3/UL (ref 4–11)

## 2024-10-12 PROCEDURE — 85027 COMPLETE CBC AUTOMATED: CPT

## 2024-10-12 PROCEDURE — 80048 BASIC METABOLIC PNL TOTAL CA: CPT

## 2024-10-12 PROCEDURE — 82306 VITAMIN D 25 HYDROXY: CPT

## 2024-10-12 PROCEDURE — 36415 COLL VENOUS BLD VENIPUNCTURE: CPT

## 2024-10-17 DIAGNOSIS — F41.9 ANXIETY: ICD-10-CM

## 2024-10-17 DIAGNOSIS — F32.3 MAJOR DEPRESSIVE DISORDER, SINGLE EPISODE, SEVERE WITH PSYCHOTIC FEATURES (H): ICD-10-CM

## 2024-10-18 RX ORDER — ARIPIPRAZOLE 5 MG/1
5 TABLET ORAL EVERY MORNING
Qty: 90 TABLET | Refills: 1 | Status: SHIPPED | OUTPATIENT
Start: 2024-10-18

## 2024-10-29 ENCOUNTER — TRANSFERRED RECORDS (OUTPATIENT)
Dept: HEALTH INFORMATION MANAGEMENT | Facility: CLINIC | Age: 69
End: 2024-10-29
Payer: COMMERCIAL

## 2024-11-08 ENCOUNTER — ALLIED HEALTH/NURSE VISIT (OUTPATIENT)
Dept: FAMILY MEDICINE | Facility: CLINIC | Age: 69
End: 2024-11-08
Payer: COMMERCIAL

## 2024-11-08 VITALS — DIASTOLIC BLOOD PRESSURE: 76 MMHG | HEART RATE: 80 BPM | SYSTOLIC BLOOD PRESSURE: 146 MMHG

## 2024-11-08 DIAGNOSIS — Z01.30 BLOOD PRESSURE CHECK: Primary | ICD-10-CM

## 2024-11-08 PROCEDURE — 99207 PR NO CHARGE NURSE ONLY: CPT | Performed by: FAMILY MEDICINE

## 2024-11-15 ENCOUNTER — NURSE TRIAGE (OUTPATIENT)
Dept: FAMILY MEDICINE | Facility: CLINIC | Age: 69
End: 2024-11-15
Payer: COMMERCIAL

## 2024-11-15 NOTE — TELEPHONE ENCOUNTER
"Patient is calling  Today noticed pink spotting.  Asymptomatic.    Please advise if patient can be scheduled on any approval slot?    Reason for Disposition   Patient wants to be seen   Postmenopausal vaginal bleeding    Additional Information   Negative: Shock suspected (e.g., cold/pale/clammy skin, too weak to stand, low BP, rapid pulse)   Negative: Difficult to awaken or acting confused (e.g., disoriented, slurred speech)   Negative: Passed out (i.e., lost consciousness, collapsed and was not responding)   Negative: Sounds like a life-threatening emergency to the triager   Negative: Followed a genital area injury (e.g., vagina, vulva)   Negative: Vaginal discharge is main symptom and small amount of blood   Negative: SEVERE abdominal pain   Negative: SEVERE dizziness (e.g., unable to stand, requires support to walk, feels like passing out now)   Negative: Sexual assault or rape (sexual intercourse or activity occurs without freely given consent), known or suspected   Negative: SEVERE vaginal bleeding (e.g., soaking 2 pads or tampons per hour and present 2 or more hours; 1 menstrual cup every 2 hours)   Negative: Patient sounds very sick or weak to the triager   Negative: MODERATE vaginal bleeding (e.g., soaking 1 pad or tampon per hour and present > 6 hours; 1 menstrual cup every 6 hours)   Negative: Pale skin (pallor) of new-onset or worsening   Negative: Constant abdominal pain and present > 2 hours   Negative: Taking Coumadin (warfarin) or other strong blood thinner, or known bleeding disorder (e.g., thrombocytopenia)   Negative: Bleeding lasts for > 7 days   Negative: Bleeding or spotting after procedure (e.g., biopsy) or pelvic examination (e.g., pap smear) that lasts > 7 days   Negative: Bleeding with > 6 soaked pads or tampons per day    Answer Assessment - Initial Assessment Questions  1. AMOUNT: \"Describe the bleeding that you are having.\" \"How much bleeding is there?\"     - SPOTTING: spotting, or " "pinkish / brownish mucous discharge; does not fill panty liner or pad     - MILD:  less than 1 pad / hour; less than patient's usual menstrual bleeding    - MODERATE: 1-2 pads / hour; 1 menstrual cup every 6 hours; small-medium blood clots (e.g., pea, grape, small coin)    - SEVERE: soaking 2 or more pads/hour for 2 or more hours; 1 menstrual cup every 2 hours; bleeding not contained by pads or continuous red blood from vagina; large blood clots (e.g., golf ball, large coin)       Spotting, light pink   2. ONSET: \"When did the bleeding begin?\" \"Is it continuing now?\"      This morning  3. MENOPAUSE: \"When was your last menstrual period?\"       Yrs ago  4. ABDOMEN PAIN: \"Do you have any pain?\" \"How bad is the pain?\"  (e.g., Scale 1-10; mild, moderate, or severe)    - MILD (1-3): doesn't interfere with normal activities, abdomen soft and not tender to touch     - MODERATE (4-7): interferes with normal activities or awakens from sleep, abdomen tender to touch     - SEVERE (8-10): excruciating pain, doubled over, unable to do any normal activities       No    5. BLOOD THINNERS: \"Do you take any blood thinners?\" (e.g., Coumadin/warfarin, Pradaxa/dabigatran, aspirin)      Low dose ASA 81 mg  6. HORMONE MEDICINES: \"Are you taking any hormone medicines, prescription or OTC?\" (e.g., birth control pills, estrogen)      No    7. CAUSE: \"What do you think is causing the bleeding?\" (e.g., recent gyn surgery, recent gyn procedure; known bleeding disorder, uterine cancer)        MANJEET  8. HEMODYNAMIC STATUS: \"Are you weak or feeling lightheaded?\" If Yes, ask: \"Can you stand and walk normally?\"        No  Walks normally  9. OTHER SYMPTOMS: \"What other symptoms are you having with the bleeding?\" (e.g., back pain, burning with urination, fever)      No    Protocols used: Vaginal Bleeding - [id number]    Katelynn Molina RN, BSN  [facility]    "

## 2024-11-15 NOTE — TELEPHONE ENCOUNTER
Pt returned call to clinic and was notified of provider's 11/15/2024  4:31 PM message below.    Pt was assisted with scheduling appointment with PCP on Monday to evaluate the reported postmenopausal spotting.    RN reviewed with pt that per protocol, if bleeding increases significantly to using > 6 pads per day that she will need to be seen sooner and not wait until Monday. RN advised pt if she develops any urinary sx such as blood in urine, urinary urgency, or frequency, or other sx she should be seen in urgent care for evaluation, and not wait until Monday for her initial evaluation.    LUCY DianaN, RN

## 2024-11-15 NOTE — TELEPHONE ENCOUNTER
Does not need same day. Can schedule with pcp in approval slot to discuss postmenopausal bleeding.    Brooks De Dios PA-C

## 2024-11-17 DIAGNOSIS — I10 HYPERTENSION GOAL BP (BLOOD PRESSURE) < 140/90: ICD-10-CM

## 2024-11-17 RX ORDER — LOSARTAN POTASSIUM 50 MG/1
50 TABLET ORAL DAILY
Qty: 90 TABLET | Refills: 0 | Status: SHIPPED | OUTPATIENT
Start: 2024-11-17

## 2024-11-18 ENCOUNTER — OFFICE VISIT (OUTPATIENT)
Dept: FAMILY MEDICINE | Facility: CLINIC | Age: 69
End: 2024-11-18
Payer: COMMERCIAL

## 2024-11-18 VITALS
HEIGHT: 64 IN | SYSTOLIC BLOOD PRESSURE: 139 MMHG | RESPIRATION RATE: 20 BRPM | OXYGEN SATURATION: 97 % | HEART RATE: 95 BPM | DIASTOLIC BLOOD PRESSURE: 84 MMHG | WEIGHT: 225.8 LBS | TEMPERATURE: 97.6 F | BODY MASS INDEX: 38.55 KG/M2

## 2024-11-18 DIAGNOSIS — N30.01 ACUTE CYSTITIS WITH HEMATURIA: ICD-10-CM

## 2024-11-18 DIAGNOSIS — F41.9 ANXIETY: ICD-10-CM

## 2024-11-18 DIAGNOSIS — Z74.09 IMPAIRED FUNCTIONAL MOBILITY, BALANCE, GAIT, AND ENDURANCE: ICD-10-CM

## 2024-11-18 DIAGNOSIS — R31.0 GROSS HEMATURIA: Primary | ICD-10-CM

## 2024-11-18 LAB
ALBUMIN UR-MCNC: NEGATIVE MG/DL
APPEARANCE UR: CLEAR
BILIRUB UR QL STRIP: NEGATIVE
COLOR UR AUTO: YELLOW
GLUCOSE UR STRIP-MCNC: 250 MG/DL
HGB UR QL STRIP: ABNORMAL
HYALINE CASTS #/AREA URNS LPF: ABNORMAL /LPF
KETONES UR STRIP-MCNC: NEGATIVE MG/DL
LEUKOCYTE ESTERASE UR QL STRIP: NEGATIVE
NITRATE UR QL: NEGATIVE
PH UR STRIP: 5.5 [PH] (ref 5–7)
RBC #/AREA URNS AUTO: ABNORMAL /HPF
SP GR UR STRIP: 1.01 (ref 1–1.03)
SQUAMOUS #/AREA URNS AUTO: ABNORMAL /LPF
UROBILINOGEN UR STRIP-ACNC: 0.2 E.U./DL
WBC #/AREA URNS AUTO: ABNORMAL /HPF

## 2024-11-18 PROCEDURE — G2211 COMPLEX E/M VISIT ADD ON: HCPCS | Performed by: FAMILY MEDICINE

## 2024-11-18 PROCEDURE — 99214 OFFICE O/P EST MOD 30 MIN: CPT | Performed by: FAMILY MEDICINE

## 2024-11-18 PROCEDURE — 87086 URINE CULTURE/COLONY COUNT: CPT | Performed by: FAMILY MEDICINE

## 2024-11-18 RX ORDER — SULFAMETHOXAZOLE AND TRIMETHOPRIM 800; 160 MG/1; MG/1
1 TABLET ORAL 2 TIMES DAILY
Qty: 14 TABLET | Refills: 0 | Status: SHIPPED | OUTPATIENT
Start: 2024-11-18 | End: 2024-11-25

## 2024-11-18 RX ORDER — BUSPIRONE HYDROCHLORIDE 5 MG/1
TABLET ORAL
Qty: 210 TABLET | Refills: 0 | Status: SHIPPED | OUTPATIENT
Start: 2024-11-18 | End: 2024-12-28

## 2024-11-18 ASSESSMENT — ENCOUNTER SYMPTOMS: HEMATURIA: 1

## 2024-11-18 ASSESSMENT — PAIN SCALES - GENERAL: PAINLEVEL_OUTOF10: NO PAIN (0)

## 2024-11-18 NOTE — PROGRESS NOTES
"  Assessment & Plan     (R31.9) Hematuria  (primary encounter diagnosis)  Comment: suspect UTI, unable to provide sample during visit  Plan: UA with Microscopic - lab collect  (N30.01) Acute cystitis with hematuria  Plan: sulfamethoxazole-trimethoprim (BACTRIM DS)         800-160 MG tablet        UA not suspicious for UTI. Given symptoms, culture ordered and plan to continue course of antibiotics.    (F41.9) Anxiety  Comment: worsening  Plan: busPIRone (BUSPAR) 5 MG tablet        Trial buspirone taper, follow up via MyChart with effective dose.    (Z74.09) Impaired functional mobility, balance, gait, and endurance  Comment: worse after recent fall. Upcoming left knee replacement  Plan: referral to PT    The longitudinal plan of care for the diagnosis(es)/condition(s) as documented were addressed during this visit. Due to the added complexity in care, I will continue to support Lindsay in the subsequent management and with ongoing continuity of care.      BMI  Estimated body mass index is 38.76 kg/m  as calculated from the following:    Height as of this encounter: 1.626 m (5' 4\").    Weight as of this encounter: 102.4 kg (225 lb 12.8 oz).         Subjective   Lindsay is a 69 year old, presenting for the following health issues:  Hematuria (Foul smell)      11/18/2024     8:58 AM   Additional Questions   Roomed by darcy   Accompanied by cierra Mckeon         11/18/2024     8:58 AM   Patient Reported Additional Medications   Patient reports taking the following new medications none     Hematuria    History of Present Illness       Reason for visit:  Urine  Symptom onset:  3-7 days ago  Symptoms include:  Blood in urine, foul smell and frequency  Symptom intensity:  Mild  Symptom progression:  Staying the same  Had these symptoms before:  No  What makes it worse:  None  What makes it better:  None   She is taking medications regularly.       Pre-Provider Visit Orders- Urinalysis UA/UC  Patient reports the following symptoms:  " "possible urinary tract infection (UTI) , frequent urination , foul-smelling urine , and blood in the urine   Does the patient report any of the following symptoms: vaginal discharge, vaginal itching, possible yeast infection, has a urinary catheter in place, or unable to void in a specimen cup?  Whitley Montoya presents with UTI symptoms for about 5-7 days. Reports noting pink urine, with dysuria, frequency, and urgency. Denies fevers/chills or flank pain. Denies abnormal vaginal discharge or bleeding. Denies bowel changes.    Has noted more anxiety recently. Reports getting shaky when anxious. She had a fall a few months ago and has since felt more anxiety about falling. Started using a cane for ambulating after her fall, which helps some, but does note some balance difficulties. She is scheduled to have her left knee replaced in Jan 2025; this also contributes to her anxiety.    Review of Systems  Constitutional, HEENT, cardiovascular, pulmonary, gi and gu systems are negative, except as otherwise noted.      Objective    /84 (BP Location: Right arm, Patient Position: Sitting, Cuff Size: Adult Large)   Pulse 95   Temp 97.6  F (36.4  C) (Tympanic)   Resp 20   Ht 1.626 m (5' 4\")   Wt 102.4 kg (225 lb 12.8 oz)   SpO2 97%   BMI 38.76 kg/m    Body mass index is 38.76 kg/m .  Physical Exam   GENERAL: alert and no distress  NECK: no adenopathy, no asymmetry, masses, or scars  RESP: lungs clear to auscultation - no rales, rhonchi or wheezes  CV: regular rate and rhythm, normal S1 S2, no S3 or S4, no murmur, click or rub, no peripheral edema  ABDOMEN: soft, nontender, no hepatosplenomegaly, no masses and bowel sounds normal  MS: no gross musculoskeletal defects noted, no edema  NEURO: Normal strength and tone, mentation intact and speech normal  PSYCH: mentation appears normal, affect normal/bright    Results for orders placed or performed in visit on 11/18/24 (from the past 24 hours)   UA with Microscopic - " lab collect   Result Value Ref Range    Color Urine Yellow Colorless, Straw, Light Yellow, Yellow    Appearance Urine Clear Clear    Glucose Urine 250 (A) Negative mg/dL    Bilirubin Urine Negative Negative    Ketones Urine Negative Negative mg/dL    Specific Gravity Urine 1.010 1.003 - 1.035    Blood Urine Trace (A) Negative    pH Urine 5.5 5.0 - 7.0    Protein Albumin Urine Negative Negative mg/dL    Urobilinogen Urine 0.2 0.2, 1.0 E.U./dL    Nitrite Urine Negative Negative    Leukocyte Esterase Urine Negative Negative   Urine Microscopic Exam   Result Value Ref Range    RBC Urine None Seen 0-2 /HPF /HPF    WBC Urine None Seen 0-5 /HPF /HPF    Squamous Epithelials Urine Few (A) None Seen /LPF    Hyaline Casts Urine 2-5 (A) None Seen /LPF           Signed Electronically by: Colleen Marroquin MD

## 2024-11-19 LAB — BACTERIA UR CULT: NORMAL

## 2025-01-04 DIAGNOSIS — I10 HYPERTENSION GOAL BP (BLOOD PRESSURE) < 140/90: ICD-10-CM

## 2025-01-04 DIAGNOSIS — I50.32 CHRONIC HEART FAILURE WITH PRESERVED EJECTION FRACTION (H): ICD-10-CM

## 2025-01-05 RX ORDER — FUROSEMIDE 40 MG/1
40 TABLET ORAL DAILY
Qty: 90 TABLET | Refills: 1 | Status: SHIPPED | OUTPATIENT
Start: 2025-01-05

## 2025-01-13 ENCOUNTER — OFFICE VISIT (OUTPATIENT)
Dept: FAMILY MEDICINE | Facility: CLINIC | Age: 70
End: 2025-01-13
Payer: COMMERCIAL

## 2025-01-13 VITALS
SYSTOLIC BLOOD PRESSURE: 130 MMHG | DIASTOLIC BLOOD PRESSURE: 68 MMHG | RESPIRATION RATE: 16 BRPM | OXYGEN SATURATION: 96 % | WEIGHT: 235 LBS | HEIGHT: 64 IN | HEART RATE: 90 BPM | BODY MASS INDEX: 40.12 KG/M2 | TEMPERATURE: 97.4 F

## 2025-01-13 DIAGNOSIS — F32.3 MAJOR DEPRESSIVE DISORDER, SINGLE EPISODE, SEVERE WITH PSYCHOTIC FEATURES (H): ICD-10-CM

## 2025-01-13 DIAGNOSIS — Z01.818 PREOP GENERAL PHYSICAL EXAM: Primary | ICD-10-CM

## 2025-01-13 DIAGNOSIS — I50.32 CHRONIC HEART FAILURE WITH PRESERVED EJECTION FRACTION (H): ICD-10-CM

## 2025-01-13 DIAGNOSIS — M17.12 PRIMARY OSTEOARTHRITIS OF LEFT KNEE: ICD-10-CM

## 2025-01-13 DIAGNOSIS — E66.813 CLASS 3 SEVERE OBESITY DUE TO EXCESS CALORIES WITH SERIOUS COMORBIDITY AND BODY MASS INDEX (BMI) OF 40.0 TO 44.9 IN ADULT (H): ICD-10-CM

## 2025-01-13 DIAGNOSIS — I11.0 HYPERTENSIVE HEART DISEASE WITH HEART FAILURE (H): ICD-10-CM

## 2025-01-13 DIAGNOSIS — I10 HYPERTENSION GOAL BP (BLOOD PRESSURE) < 140/90: ICD-10-CM

## 2025-01-13 DIAGNOSIS — E66.01 CLASS 3 SEVERE OBESITY DUE TO EXCESS CALORIES WITH SERIOUS COMORBIDITY AND BODY MASS INDEX (BMI) OF 40.0 TO 44.9 IN ADULT (H): ICD-10-CM

## 2025-01-13 LAB
ERYTHROCYTE [DISTWIDTH] IN BLOOD BY AUTOMATED COUNT: 15.4 % (ref 10–15)
HCT VFR BLD AUTO: 46.2 % (ref 35–47)
HGB BLD-MCNC: 15.3 G/DL (ref 11.7–15.7)
MCH RBC QN AUTO: 28.9 PG (ref 26.5–33)
MCHC RBC AUTO-ENTMCNC: 33.1 G/DL (ref 31.5–36.5)
MCV RBC AUTO: 87 FL (ref 78–100)
PLATELET # BLD AUTO: 274 10E3/UL (ref 150–450)
RBC # BLD AUTO: 5.3 10E6/UL (ref 3.8–5.2)
WBC # BLD AUTO: 9.7 10E3/UL (ref 4–11)

## 2025-01-13 PROCEDURE — 93000 ELECTROCARDIOGRAM COMPLETE: CPT | Performed by: FAMILY MEDICINE

## 2025-01-13 PROCEDURE — 85027 COMPLETE CBC AUTOMATED: CPT | Performed by: FAMILY MEDICINE

## 2025-01-13 PROCEDURE — 80048 BASIC METABOLIC PNL TOTAL CA: CPT | Performed by: FAMILY MEDICINE

## 2025-01-13 PROCEDURE — 36415 COLL VENOUS BLD VENIPUNCTURE: CPT | Performed by: FAMILY MEDICINE

## 2025-01-13 PROCEDURE — 99214 OFFICE O/P EST MOD 30 MIN: CPT | Performed by: FAMILY MEDICINE

## 2025-01-13 RX ORDER — LOSARTAN POTASSIUM 50 MG/1
50 TABLET ORAL DAILY
Qty: 90 TABLET | Refills: 1 | Status: SHIPPED | OUTPATIENT
Start: 2025-01-13

## 2025-01-13 ASSESSMENT — PAIN SCALES - GENERAL: PAINLEVEL_OUTOF10: NO PAIN (0)

## 2025-01-13 NOTE — PROGRESS NOTES
Preoperative Evaluation  [facility]  [address] [facility] [address]  Phone: [phone]  Primary Provider: Colleen Marroquin MD  Pre-op Performing Provider: Colleen Marroquin MD  Jan 13, 2025 1/13/2025   Surgical Information   What procedure is being done? Left knee replacement   Facility or Hospital where procedure/surgery will be performed: [facility]   Who is doing the procedure / surgery? Dr Mckeon   Date of surgery / procedure: 1/29/25   Time of surgery / procedure: 0   Where do you plan to recover after surgery? at home with family     Fax number for surgical facility: [phone]    Assessment & Plan     The proposed surgical procedure is considered INTERMEDIATE risk.    (Z01.818) Preop general physical exam  (primary encounter diagnosis)  (M17.12) Primary osteoarthritis of left knee  Comment: osteoarthritis of left knee requiring total knee arthroplasty  Plan: EKG 12-lead complete w/read - Clinics        Medically optimized for procedure    (I10) Hypertension goal BP (blood pressure) < 140/90  (I11.0) Hypertensive heart disease with heart failure (H)  (I50.32) Chronic heart failure with preserved ejection fraction (H)  Comment: controlled bp, no recent cardiac symptoms  Plan: Medically optimized for procedure  Take all meds the morning of surgery (losartan, jardiance, ok to hold furosemide if desire)    (F32.3) Major depressive disorder, single episode, severe with psychotic features (H)  Comment: stable, has not tapered the buspar, would like to stop  Plan: continue abilify and sertraline, ok to hold the morning of surgery    (E66.813,  E66.01,  Z68.41) Class 3 severe obesity due to excess calories with serious comorbidity and body mass index (BMI) of 40.0 to 44.9 in adult (H)  Comment: trending up  Plan: after recovery, increase activity to help with wt loss         Risks and Recommendations  The patient has the following additional risks and  recommendations for perioperative complications:   - Morbid obesity (BMI >40)    Antiplatelet or Anticoagulation Medication Instructions   - aspirin: Discontinue aspirin 7 days prior to procedure to reduce bleeding risk. It should be resumed postoperatively.     Additional Medication Instructions  Take all scheduled medications on the day of surgery EXCEPT for modifications listed below:   - Herbal medications and vitamins: DO NOT TAKE 14 days prior to surgery.  Ok to hold furosemide if desired    Recommendation  Approval given to proceed with proposed procedure, without further diagnostic evaluation.    Christoph Montoya is a 69 year old, presenting for the following:  Pre-Op Exam (Fax: [phone])          1/13/2025    10:50 AM   Additional Questions   Roomed by Sangeetha JAMES   Accompanied by Rody         1/13/2025   Forms   Any forms needing to be completed Yes     HPI related to upcoming procedure: osteoarthritis of left knee requiring total knee arthroplasty        1/13/2025   Pre-Op Questionnaire   Have you ever had a heart attack or stroke? No   Have you ever had surgery on your heart or blood vessels, such as a stent placement, a coronary artery bypass, or surgery on an artery in your head, neck, heart, or legs? No   Do you have chest pain with activity? No   Do you have a history of heart failure? (!) YES see pmh, no recent issues   Do you currently have a cold, bronchitis or symptoms of other infection? No   Do you have a cough, shortness of breath, or wheezing? No   Do you or anyone in your family have previous history of blood clots? (!) YES sister/mother - post-surgical   Do you or does anyone in your family have a serious bleeding problem such as prolonged bleeding following surgeries or cuts? No   Have you ever had problems with anemia or been told to take iron pills? No   Have you had any abnormal blood loss such as black, tarry or bloody stools, or abnormal vaginal bleeding? No   Have you ever had  a blood transfusion? No   Are you willing to have a blood transfusion if it is medically needed before, during, or after your surgery? Yes   Have you or any of your relatives ever had problems with anesthesia? No   Do you have sleep apnea, excessive snoring or daytime drowsiness? (!) UNKNOWN   Do you have any artifical heart valves or other implanted medical devices like a pacemaker, defibrillator, or continuous glucose monitor? No   Do you have artificial joints? No   Are you allergic to latex? No     Health Care Directive  Patient does not have a Health Care Directive: Discussed advance care planning with patient; information given to patient to review.    Preoperative Review of    reviewed - controlled substances reflected in medication list.      Status of Chronic Conditions:  See problem list for active medical problems.  Problems all longstanding and stable, except as noted/documented.  See ROS for pertinent symptoms related to these conditions.    CHF - Patient has a longstanding history of moderate-severe CHF. Exacerbating conditions include hypertension. Currently the patient's condition is same. Current treatment regimen includes Angiotensin 2 receptor blocker, diuretic, and SGLT2. The patient denies chest pain, edema, orthopnea, SOB or recent weight gain. Last Echocardiogram 3/2021, EKG 01/13/25.     DEPRESSION - Patient has a long history of Depression of moderate severity requiring medication for control with recent symptoms being stable..Current symptoms of depression include none.     HYPERTENSION - Patient has longstanding history of HTN , currently denies any symptoms referable to elevated blood pressure. Specifically denies chest pain, palpitations, dyspnea, orthopnea, PND or peripheral edema. Blood pressure readings have been in normal range. Current medication regimen is as listed below. Patient denies any side effects of medication.     Patient Active Problem List    Diagnosis Date Noted     Chronic heart failure with preserved ejection fraction (H) 04/07/2021     Priority: Medium    Abnormal uterine bleeding due to endometrial polyp 03/27/2021     Priority: Medium    Major depressive disorder, single episode, severe with psychotic features (H) 03/14/2021     Priority: Medium    Post-menopause bleeding 02/04/2021     Priority: Medium    Class 3 severe obesity due to excess calories with serious comorbidity and body mass index (BMI) of 40.0 to 44.9 in adult (H) 01/24/2017     Priority: Medium    Hypertension goal BP (blood pressure) < 140/90 09/30/2015     Priority: Medium    Cervical polyp 09/20/2011     Priority: Medium    Hyperlipidemia with target LDL less than 130 08/17/2010     Priority: Medium     Diagnosis updated by automated process. Provider to review and confirm.        Past Medical History:   Diagnosis Date    Abnormal Pap smear 1985    cryo, nl paps since    Depression     Glaucoma 01/30/2020    left eye     HTN (hypertension)     Hyperlipidemia     Hyperlipidemia LDL goal < 130     Hypertension      Past Surgical History:   Procedure Laterality Date    CHOLECYSTECTOMY      CHOLECYSTECTOMY, LAPOROSCOPIC           OPERATIVE HYSTEROSCOPY WITH MORCELLATOR N/A 03/23/2021    Procedure: Diagnostic Hysteroscopy with biopsy;  Surgeon: Deny Tapia MD;  Location:  OR    OTHER SURGICAL HISTORY      uterine polypectomy     SHOULDER SURGERY Right 03/29/2021    Procedure: CLOSED REDUCTION, SHOULDER;  Surgeon: Melo Mckeon MD;  Location: [facility];  Service: Orthopedics     Current Outpatient Medications   Medication Sig Dispense Refill    ARIPiprazole (ABILIFY) 5 MG tablet Take 1 tablet (5 mg) by mouth every morning 90 tablet 1    aspirin 81 MG EC tablet Take 81 mg by mouth daily With a meal       atorvastatin (LIPITOR) 10 MG tablet Take 1 tablet (10 mg) by mouth daily 90 tablet 3    CALCIUM 600 + D 600-200 MG-UNIT OR TABS 2 pill a day  3    CENTRUM SILVER OR TABS ONE DAILY 0 0  "   empagliflozin (JARDIANCE) 10 MG TABS tablet Take 1 tablet (10 mg) by mouth daily 90 tablet 1    furosemide (LASIX) 40 MG tablet Take 1 tablet (40 mg) by mouth daily 90 tablet 1    gabapentin (NEURONTIN) 300 MG capsule Take 1 capsule (300 mg) by mouth 3 times daily 270 capsule 1    latanoprost (XALATAN) 0.005 % ophthalmic solution Instill 1 drop Both Eyes every evening.*      losartan (COZAAR) 50 MG tablet Take 1 tablet (50 mg) by mouth daily 90 tablet 0    sertraline (ZOLOFT) 50 MG tablet Take 2.5 tablets (125 mg) by mouth daily 225 tablet 1    busPIRone (BUSPAR) 5 MG tablet Take 1 tablet (5 mg) by mouth 2 times daily for 5 days, THEN 2 tablets (10 mg) 2 times daily for 5 days, THEN 3 tablets (15 mg) 2 times daily. 210 tablet 0       Allergies   Allergen Reactions    Triamterene Hives        Social History     Tobacco Use    Smoking status: Never    Smokeless tobacco: Never    Tobacco comments:     no smokers in the [facility]   Substance Use Topics    Alcohol use: Yes     Comment: occasional       History   Drug Use No             Review of Systems  Constitutional, neuro, ENT, endocrine, pulmonary, cardiac, gastrointestinal, genitourinary, musculoskeletal, integument and psychiatric systems are negative, except as otherwise noted.    Objective    /68   Pulse 90   Temp 97.4  F (36.3  C) (Tympanic)   Resp 16   Ht 1.626 m (5' 4\")   Wt 106.6 kg (235 lb)   SpO2 96%   BMI 40.34 kg/m     Estimated body mass index is 40.34 kg/m  as calculated from the following:    Height as of this encounter: 1.626 m (5' 4\").    Weight as of this encounter: 106.6 kg (235 lb).  Physical Exam  GENERAL: alert and no distress  EYES: Eyes grossly normal to inspection, PERRL and conjunctivae and sclerae normal  HENT: ear canals and TM's normal, nose and mouth without ulcers or lesions  NECK: no adenopathy, no asymmetry, masses, or scars  RESP: lungs clear to auscultation - no rales, rhonchi or wheezes  CV: regular rate and " rhythm, normal S1 S2, no S3 or S4, no murmur, click or rub, no peripheral edema  ABDOMEN: soft, nontender, no hepatosplenomegaly, no masses and bowel sounds normal  MS: no gross musculoskeletal defects noted, no edema  SKIN: no suspicious lesions or rashes  NEURO: Normal strength and tone, sensory exam grossly normal, mentation intact, and cranial nerves 2-12 intact  PSYCH: mentation appears normal, affect normal/bright    Recent Labs   Lab Test 10/12/24  1257 03/14/24  1425 03/11/24  0839   HGB 15.5  --   --      --   --     137 139   POTASSIUM 3.6 4.0 4.3   CR 0.63 0.66 0.75   A1C  --   --  5.8*        Diagnostics  Labs pending at this time.  Results will be reviewed when available.   EKG: appears normal, NSR, normal axis, normal intervals, no acute ST/T changes c/w ischemia, no LVH by voltage criteria, unchanged from previous tracings    Revised Cardiac Risk Index (RCRI)  The patient has the following serious cardiovascular risks for perioperative complications:   - No serious cardiac risks = 0 points     RCRI Interpretation: 0 points: Class I (very low risk - 0.4% complication rate)         Signed Electronically by: Colleen Marroquin MD  A copy of this evaluation report is provided to the requesting physician.

## 2025-01-13 NOTE — PATIENT INSTRUCTIONS
How to Take Your Medication Before Surgery  Preoperative Medication Instructions   Antiplatelet or Anticoagulation Medication Instructions   - aspirin: Discontinue aspirin 7 days prior to procedure to reduce bleeding risk. It should be resumed postoperatively.     Additional Medication Instructions  Take all scheduled medications on the day of surgery EXCEPT for modifications listed below:   - Herbal medications and vitamins: DO NOT TAKE 14 days prior to surgery.  Ok to hold furosemide the morning of surgery       Patient Education   Preparing for Your Surgery  For Adults  Getting started  In most cases, a nurse will call to review your health history and instructions. They will give you an arrival time based on your scheduled surgery time. Please be ready to share:  Your doctor's clinic name and phone number  Your medical, surgical, and anesthesia history  A list of allergies and sensitivities  A list of medicines, including herbal treatments and over-the-counter drugs  Whether the patient has a legal guardian (ask how to send us the papers in advance)  Note: You may not receive a call if you were seen at our PAC (Preoperative Assessment Center).  Please tell us if you're pregnant--or if there's any chance you might be pregnant. Some surgeries may injure a fetus (unborn baby), so they require a pregnancy test. Surgeries that are safe for a fetus don't always need a test, and you can choose whether to have one.   Preparing for surgery  Within 10 to 30 days of surgery: Have a pre-op exam (sometimes called an H&P, or History and Physical). This can be done at a clinic or pre-operative center.  If you're having a , you may not need this exam. Talk to your care team.  At your pre-op exam, talk to your care team about all medicines you take. (This includes CBD oil and any drugs, such as THC, marijuana, and other forms of cannabis.) If you need to stop any medicine before surgery, ask when to start taking it  again.  This is for your safety. Many medicines and drugs can make you bleed too much during surgery. Some change how well surgery (anesthesia) drugs work.  Call your insurance company to let them know you're having surgery. (If you don't have insurance, call [phone].)  Call your clinic if there's any change in your health. This includes a scrape or scratch near the surgery site, or any signs of a cold (sore throat, runny nose, cough, rash, fever).  Eating and drinking guidelines  For your safety: Unless your surgeon tells you otherwise, follow the guidelines below.  Eat and drink as normal until 8 hours before you arrive for surgery. After that, no food or milk. You can spit out gum when you arrive.  Drink clear liquids until 2 hours before you arrive. These are liquids you can see through, like water, Gatorade, and Propel Water. They also include plain black coffee and tea (no cream or milk).  No alcohol for 24 hours before you arrive. The night before surgery, stop any drinks that contain THC.  If your care team tells you to take medicine on the morning of surgery, it's okay to take it with a sip of water. No other medicines or drugs are allowed (including CBD oil)--follow your care team's instructions.  If you have questions the day of surgery, call your hospital or surgery center.   Preventing infection  Shower or bathe the night before and the morning of surgery. Follow the instructions your clinic gave you. (If no instructions, use regular soap.)  Don't shave or clip hair near your surgery site. We'll remove the hair if needed.  Don't smoke or vape the morning of surgery. No chewing tobacco for 6 hours before you arrive. A nicotine patch is okay. You may spit out nicotine gum when you arrive.  For some surgeries, the surgeon will tell you to fully quit smoking and nicotine.  We will make every effort to keep you safe from infection. We will:  Clean our hands often with soap and water (or an alcohol-based  hand rub).  Clean the skin at your surgery site with a special soap that kills germs.  Give you a special gown to keep you warm. (Cold raises the risk of infection.)  Wear hair covers, masks, gowns, and gloves during surgery.  Give antibiotic medicine, if prescribed. Not all surgeries need this medicine.  What to bring on the day of surgery  Photo ID and insurance card  Copy of your health care directive, if you have one  Glasses and hearing aids (bring cases)  You can't wear contacts during surgery  Inhaler and eye drops, if you use them (tell us about these when you arrive)  CPAP machine or breathing device, if you use them  A few personal items, if spending the night  If you have . . .  A pacemaker, ICD (cardiac defibrillator), or other implant: Bring the ID card.  An implanted stimulator: Bring the remote control.  A legal guardian: Bring a copy of the certified (court-stamped) guardianship papers.  Please remove any jewelry, including body piercings. Leave jewelry and other valuables at home.  If you're going home the day of surgery  You must have a responsible adult drive you home. They should stay with you overnight as well.  If you don't have someone to stay with you, and you aren't safe to go home alone, we may keep you overnight. Insurance often won't pay for this.  After surgery  If it's hard to control your pain or you need more pain medicine, please call your surgeon's office.  Questions?   If you have any questions for your care team, list them here:   ____________________________________________________________________________________________________________________________________________________________________________________________________________________________________________________________  For informational purposes only. Not to replace the advice of your health care provider. Copyright   2003, 2019 [facility]. All rights reserved. Clinically reviewed by Dc Putnam MD.  [employer] [id number] - REV 08/24.

## 2025-01-14 ENCOUNTER — TRANSCRIBE ORDERS (OUTPATIENT)
Dept: OTHER | Age: 70
End: 2025-01-14

## 2025-01-14 DIAGNOSIS — Z47.1 ORTHOPEDIC AFTERCARE FOR JOINT REPLACEMENT: Primary | ICD-10-CM

## 2025-01-14 LAB
ANION GAP SERPL CALCULATED.3IONS-SCNC: 12 MMOL/L (ref 7–15)
BUN SERPL-MCNC: 22.5 MG/DL (ref 8–23)
CALCIUM SERPL-MCNC: 9.8 MG/DL (ref 8.8–10.4)
CHLORIDE SERPL-SCNC: 105 MMOL/L (ref 98–107)
CREAT SERPL-MCNC: 0.6 MG/DL (ref 0.51–0.95)
EGFRCR SERPLBLD CKD-EPI 2021: >90 ML/MIN/1.73M2
GLUCOSE SERPL-MCNC: 103 MG/DL (ref 70–99)
HCO3 SERPL-SCNC: 23 MMOL/L (ref 22–29)
POTASSIUM SERPL-SCNC: 3.9 MMOL/L (ref 3.4–5.3)
SODIUM SERPL-SCNC: 140 MMOL/L (ref 135–145)

## 2025-02-06 ENCOUNTER — THERAPY VISIT (OUTPATIENT)
Dept: PHYSICAL THERAPY | Facility: CLINIC | Age: 70
End: 2025-02-06
Attending: ORTHOPAEDIC SURGERY
Payer: COMMERCIAL

## 2025-02-06 DIAGNOSIS — G89.29 CHRONIC PAIN OF LEFT KNEE: Primary | ICD-10-CM

## 2025-02-06 DIAGNOSIS — M25.562 CHRONIC PAIN OF LEFT KNEE: Primary | ICD-10-CM

## 2025-02-06 ASSESSMENT — ACTIVITIES OF DAILY LIVING (ADL)
STAND: ACTIVITY IS VERY DIFFICULT
GO UP STAIRS: ACTIVITY IS VERY DIFFICULT
SQUAT: I AM UNABLE TO DO THE ACTIVITY
PLEASE_INDICATE_YOR_PRIMARY_REASON_FOR_REFERRAL_TO_THERAPY:: KNEE
RISE FROM A CHAIR: ACTIVITY IS VERY DIFFICULT
HOW_WOULD_YOU_RATE_THE_CURRENT_FUNCTION_OF_YOUR_KNEE_DURING_YOUR_USUAL_DAILY_ACTIVITIES_ON_A_SCALE_FROM_0_TO_100_WITH_100_BEING_YOUR_LEVEL_OF_KNEE_FUNCTION_PRIOR_TO_YOUR_INJURY_AND_0_BEING_THE_INABILITY_TO_PERFORM_ANY_OF_YOUR_USUAL_DAILY_ACTIVITIES?: 75
GO DOWN STAIRS: ACTIVITY IS VERY DIFFICULT
GO UP STAIRS: ACTIVITY IS VERY DIFFICULT
PAIN: THE SYMPTOM AFFECTS MY ACTIVITY SLIGHTLY
WEAKNESS: I HAVE THE SYMPTOM BUT IT DOES NOT AFFECT MY ACTIVITY
KNEE_ACTIVITY_OF_DAILY_LIVING_SUM: 33
WALK: ACTIVITY IS MINIMALLY DIFFICULT
WEAKNESS: I HAVE THE SYMPTOM BUT IT DOES NOT AFFECT MY ACTIVITY
STIFFNESS: THE SYMPTOM AFFECTS MY ACTIVITY SLIGHTLY
KNEE_ACTIVITY_OF_DAILY_LIVING_SCORE: 47.14
LIMPING: I DO NOT HAVE THE SYMPTOM
AS_A_RESULT_OF_YOUR_KNEE_INJURY,_HOW_WOULD_YOU_RATE_YOUR_CURRENT_LEVEL_OF_DAILY_ACTIVITY?: ABNORMAL
LIMPING: I DO NOT HAVE THE SYMPTOM
PAIN: THE SYMPTOM AFFECTS MY ACTIVITY SLIGHTLY
HOW_WOULD_YOU_RATE_THE_OVERALL_FUNCTION_OF_YOUR_KNEE_DURING_YOUR_USUAL_DAILY_ACTIVITIES?: NEARLY NORMAL
SQUAT: I AM UNABLE TO DO THE ACTIVITY
KNEEL ON THE FRONT OF YOUR KNEE: I AM UNABLE TO DO THE ACTIVITY
RAW_SCORE: 33
GIVING WAY, BUCKLING OR SHIFTING OF KNEE: I DO NOT HAVE THE SYMPTOM
SWELLING: THE SYMPTOM AFFECTS MY ACTIVITY MODERATELY
SIT WITH YOUR KNEE BENT: ACTIVITY IS SOMEWHAT DIFFICULT
GIVING WAY, BUCKLING OR SHIFTING OF KNEE: I DO NOT HAVE THE SYMPTOM
STIFFNESS: THE SYMPTOM AFFECTS MY ACTIVITY SLIGHTLY
HOW_WOULD_YOU_RATE_THE_CURRENT_FUNCTION_OF_YOUR_KNEE_DURING_YOUR_USUAL_DAILY_ACTIVITIES_ON_A_SCALE_FROM_0_TO_100_WITH_100_BEING_YOUR_LEVEL_OF_KNEE_FUNCTION_PRIOR_TO_YOUR_INJURY_AND_0_BEING_THE_INABILITY_TO_PERFORM_ANY_OF_YOUR_USUAL_DAILY_ACTIVITIES?: 75
STAND: ACTIVITY IS VERY DIFFICULT
SWELLING: THE SYMPTOM AFFECTS MY ACTIVITY MODERATELY
SIT WITH YOUR KNEE BENT: ACTIVITY IS SOMEWHAT DIFFICULT
AS_A_RESULT_OF_YOUR_KNEE_INJURY,_HOW_WOULD_YOU_RATE_YOUR_CURRENT_LEVEL_OF_DAILY_ACTIVITY?: ABNORMAL
KNEEL ON THE FRONT OF YOUR KNEE: I AM UNABLE TO DO THE ACTIVITY
HOW_WOULD_YOU_RATE_THE_OVERALL_FUNCTION_OF_YOUR_KNEE_DURING_YOUR_USUAL_DAILY_ACTIVITIES?: NEARLY NORMAL
GO DOWN STAIRS: ACTIVITY IS VERY DIFFICULT
WALK: ACTIVITY IS MINIMALLY DIFFICULT
RISE FROM A CHAIR: ACTIVITY IS VERY DIFFICULT

## 2025-02-06 NOTE — PROGRESS NOTES
"PHYSICAL THERAPY EVALUATION  Type of Visit: Evaluation        Fall Risk Screen:  Fall screen completed by: PT  Have you fallen 2 or more times in the past year?: No  Have you fallen and had an injury in the past year?: No  Is patient a fall risk?: No    Subjective         Presenting condition or subjective complaint: knee replacement  Date of onset: 01/29/25    Relevant medical history: Arthritis; Depression; Dizziness; Heart problems; High blood pressure; Implanted device; Menopause; Overweight; Vision problems   Dates & types of surgery: 01/29/2024 total left knee replacement    Pt lengthy h/o knee pain leading to L TKA 01/29/2025.  Feels like there has been progress and describes both pain and stiffness remaining.  Likes the CPM.  Notes sit to stand and doing stairs are difficult.  Feels \"a little burning\" at rest.  Been doing ankle pumps and \"lifting the knee\" for HEP.  Finds ice helpful.    Prior diagnostic imaging/testing results: X-ray     Prior therapy history for the same diagnosis, illness or injury: Yes      Prior Level of Function  Transfers: Independent  Ambulation: Independent  ADL: Independent    Living Environment  Social support: With family members   Type of home: House   Stairs to enter the home: Yes 2 Is there a railing: Yes     Ramp: No   Stairs inside the home: Yes 24 Is there a railing: Yes     Help at home: Self Cares (home health aide/personal care attendant, family, etc); Home management tasks (cooking, cleaning); Medication and/or finances; Home and Yard maintenance tasks; Assist for driving and community activities  Equipment owned: Straight Cane; Walker with wheels; Standard wheelchair; Raised toilet seat; Lift chair     Employment: No    Hobbies/Interests: bingo gardening crafting baking    Patient goals for therapy: go up stairs and walk without walker       Objective   KNEE EVALUATION  PAIN: see above  INTEGUMENTARY (edema, incisions): incision covered in occlusive bandage without " apparent redness, drainage, warmth.  Ecchymosis surrounding L knee.   GAIT: pt ambulates with four wheeled walker.  Noted lack of terminal knee extension at late swing phase L.  Also noted pt challenged by sit to stand.  ROM: R knee AROM 0-0-97.  AROM L knee 0-8-48, PROM 0-3-59  STRENGTH: fair quad contraction L.    Assessment & Plan   CLINICAL IMPRESSIONS  Medical Diagnosis: Orthopedic aftercare for joint replacement    Treatment Diagnosis: L knee pain   Impression/Assessment: Patient is a 69 year old female with knee complaints.  The following significant findings have been identified: Pain, Decreased ROM/flexibility, Decreased strength, Inflammation, Edema, Impaired gait, Impaired muscle performance, and Decreased activity tolerance. These impairments interfere with their ability to perform community mobility as compared to previous level of function.     Clinical Decision Making (Complexity):  Clinical Presentation: Stable/Uncomplicated  Clinical Presentation Rationale: based on medical and personal factors listed in PT evaluation  Clinical Decision Making (Complexity): Low complexity    PLAN OF CARE  Treatment Interventions:  Interventions: Gait Training, Manual Therapy, Neuromuscular Re-education, Therapeutic Activity, Therapeutic Exercise    Long Term Goals     PT Goal 1  Goal Description: Minutes pt will be able to ambulate: 30  Rationale: to maximize safety and independence within the community  Goal Progress: All ambulation with four wheeled walker  Target Date: 04/03/25      Frequency of Treatment: Twice per week for four weeks, then once per week for four weeks.  However, pt indicates she will be limited by transportation availability to likely once per week in the short term  Duration of Treatment: see above    Education Assessment:   Learner/Method: Patient  Education Comments: Performance in clinic    Risks and benefits of evaluation/treatment have been explained.   Patient/Family/caregiver agrees  with Plan of Care.     Evaluation Time:     PT Eval, Low Complexity Minutes (43457): 25    Signing Clinician: LEENA Mckeon [facility]                                                                                   OUTPATIENT PHYSICAL THERAPY      PLAN OF TREATMENT FOR OUTPATIENT REHABILITATION   Patient's Last Name, First Name, Lala De La Torre YOB: 1955   Provider's Name   [facility]   Medical Record No.  [id number]     Onset Date: 01/29/25  Start of Care Date: 02/06/25     Medical Diagnosis:  Orthopedic aftercare for joint replacement      PT Treatment Diagnosis:  L knee pain Plan of Treatment  Frequency/Duration: Twice per week for four weeks, then once per week for four weeks.  However, pt indicates she will be limited by transportation availability to likely once per week in the short term/ see above    Certification date from 02/06/25 to 04/03/25         See note for plan of treatment details and functional goals     Yoni Degroot, PT                         I CERTIFY THE NEED FOR THESE SERVICES FURNISHED UNDER        THIS PLAN OF TREATMENT AND WHILE UNDER MY CARE .             Physician Signature               Date    X_____________________________________________________                  Referring Provider:  Melo Mckeon    Initial Assessment  See Epic Evaluation- Start of Care Date: 02/06/25

## 2025-02-06 NOTE — Clinical Note
Followups are with you.  Schedule is once a week based upon her availability and we did talk about potentially upping that if not progressing well.  Natalia (Chang's student) was with me today.  If the schedule works out, I could see the benefit to having her with you for some continuity.  -- Yoni

## 2025-02-12 ENCOUNTER — PATIENT OUTREACH (OUTPATIENT)
Dept: CARE COORDINATION | Facility: CLINIC | Age: 70
End: 2025-02-12
Payer: COMMERCIAL

## 2025-02-18 ENCOUNTER — TRANSFERRED RECORDS (OUTPATIENT)
Dept: HEALTH INFORMATION MANAGEMENT | Facility: CLINIC | Age: 70
End: 2025-02-18
Payer: COMMERCIAL

## 2025-02-26 ENCOUNTER — PATIENT OUTREACH (OUTPATIENT)
Dept: CARE COORDINATION | Facility: CLINIC | Age: 70
End: 2025-02-26
Payer: COMMERCIAL

## 2025-02-27 DIAGNOSIS — F32.3 MAJOR DEPRESSIVE DISORDER, SINGLE EPISODE, SEVERE WITH PSYCHOTIC FEATURES (H): ICD-10-CM

## 2025-02-27 DIAGNOSIS — F41.9 ANXIETY: ICD-10-CM

## 2025-03-25 ENCOUNTER — TRANSFERRED RECORDS (OUTPATIENT)
Dept: HEALTH INFORMATION MANAGEMENT | Facility: CLINIC | Age: 70
End: 2025-03-25

## 2025-04-01 ENCOUNTER — VIRTUAL VISIT (OUTPATIENT)
Dept: EDUCATION SERVICES | Facility: CLINIC | Age: 70
End: 2025-04-01
Payer: COMMERCIAL

## 2025-04-01 DIAGNOSIS — R73.03 PREDIABETES: Primary | ICD-10-CM

## 2025-04-01 PROCEDURE — 99207 PR NO CHARGE LOS: CPT | Mod: 93 | Performed by: DIETITIAN, REGISTERED

## 2025-04-01 NOTE — LETTER
4/1/2025         RE: Lala Yuen  [address]  [facility] [address]        Dear Colleague,    Thank you for referring your patient, Lala Yuen, to the [facility]. Please see a copy of my visit note below.      Called patient. Pt was scheduled for phone visit for pre diabetes nutrition. Writer informed patient about Medicare services:ABN and compliance/system guidelines. Dicussed options: to either reschedule to an in person visit if they are willing to pay out of pocket or could attend one of our prediabetes classes at [facility] or [place] locations - she expressed understanding and will consider scheduling for a group class at the [place] location. Encouraged pt to call with any further questions.     Fabiana Peterson RDN, MIGUELINA, [facility]  Diabetes Care and Education  [phone] (Triage)

## 2025-04-01 NOTE — PROGRESS NOTES
Called patient. Pt was scheduled for phone visit for pre diabetes nutrition. Writer informed patient about Medicare services:ABN and compliance/system guidelines. Dicussed options: to either reschedule to an in person visit if they are willing to pay out of pocket or could attend one of our prediabetes classes at [facility] or [place] locations - she expressed understanding and will consider scheduling for a group class at the [place] location. Encouraged pt to call with any further questions.     Fabiana Peterson RDN, MIGUELINA, [facility]  Diabetes Care and Education  [phone] (Triage)

## 2025-04-22 DIAGNOSIS — F41.9 ANXIETY: ICD-10-CM

## 2025-04-22 DIAGNOSIS — F32.3 MAJOR DEPRESSIVE DISORDER, SINGLE EPISODE, SEVERE WITH PSYCHOTIC FEATURES (H): ICD-10-CM

## 2025-04-22 RX ORDER — ARIPIPRAZOLE 5 MG/1
5 TABLET ORAL EVERY MORNING
Qty: 90 TABLET | Refills: 1 | Status: SHIPPED | OUTPATIENT
Start: 2025-04-22

## 2025-04-28 DIAGNOSIS — F41.9 ANXIETY: ICD-10-CM

## 2025-04-28 DIAGNOSIS — F32.3 MAJOR DEPRESSIVE DISORDER, SINGLE EPISODE, SEVERE WITH PSYCHOTIC FEATURES (H): ICD-10-CM

## 2025-04-28 RX ORDER — GABAPENTIN 300 MG/1
300 CAPSULE ORAL 3 TIMES DAILY
Qty: 270 CAPSULE | Refills: 1 | Status: SHIPPED | OUTPATIENT
Start: 2025-04-28

## 2025-05-01 ENCOUNTER — PATIENT OUTREACH (OUTPATIENT)
Dept: CARE COORDINATION | Facility: CLINIC | Age: 70
End: 2025-05-01
Payer: COMMERCIAL

## 2025-05-06 ENCOUNTER — TRANSFERRED RECORDS (OUTPATIENT)
Dept: HEALTH INFORMATION MANAGEMENT | Facility: CLINIC | Age: 70
End: 2025-05-06
Payer: COMMERCIAL

## 2025-05-08 ENCOUNTER — LAB (OUTPATIENT)
Dept: LAB | Facility: CLINIC | Age: 70
End: 2025-05-08
Payer: COMMERCIAL

## 2025-05-08 DIAGNOSIS — I50.32 CHRONIC HEART FAILURE WITH PRESERVED EJECTION FRACTION (H): ICD-10-CM

## 2025-05-08 LAB
ALBUMIN SERPL BCG-MCNC: 4.2 G/DL (ref 3.5–5.2)
ALP SERPL-CCNC: 93 U/L (ref 40–150)
ALT SERPL W P-5'-P-CCNC: ABNORMAL U/L
ANION GAP SERPL CALCULATED.3IONS-SCNC: 12 MMOL/L (ref 7–15)
AST SERPL W P-5'-P-CCNC: 40 U/L (ref 0–45)
BILIRUB SERPL-MCNC: 0.3 MG/DL
BUN SERPL-MCNC: 15.9 MG/DL (ref 8–23)
CALCIUM SERPL-MCNC: 9.9 MG/DL (ref 8.8–10.4)
CHLORIDE SERPL-SCNC: 104 MMOL/L (ref 98–107)
CHOLEST SERPL-MCNC: 167 MG/DL
CREAT SERPL-MCNC: 0.6 MG/DL (ref 0.51–0.95)
EGFRCR SERPLBLD CKD-EPI 2021: >90 ML/MIN/1.73M2
FASTING STATUS PATIENT QL REPORTED: YES
FASTING STATUS PATIENT QL REPORTED: YES
GLUCOSE SERPL-MCNC: 117 MG/DL (ref 70–99)
HCO3 SERPL-SCNC: 23 MMOL/L (ref 22–29)
HDLC SERPL-MCNC: 48 MG/DL
LDLC SERPL CALC-MCNC: 89 MG/DL
NONHDLC SERPL-MCNC: 119 MG/DL
POTASSIUM SERPL-SCNC: 4.6 MMOL/L (ref 3.4–5.3)
PROT SERPL-MCNC: 8 G/DL (ref 6.4–8.3)
SODIUM SERPL-SCNC: 139 MMOL/L (ref 135–145)
TRIGL SERPL-MCNC: 151 MG/DL

## 2025-05-19 ENCOUNTER — RESULTS FOLLOW-UP (OUTPATIENT)
Dept: FAMILY MEDICINE | Facility: CLINIC | Age: 70
End: 2025-05-19

## 2025-05-19 ENCOUNTER — OFFICE VISIT (OUTPATIENT)
Dept: FAMILY MEDICINE | Facility: CLINIC | Age: 70
End: 2025-05-19
Payer: COMMERCIAL

## 2025-05-19 VITALS
SYSTOLIC BLOOD PRESSURE: 132 MMHG | WEIGHT: 238 LBS | HEIGHT: 64 IN | RESPIRATION RATE: 16 BRPM | OXYGEN SATURATION: 96 % | TEMPERATURE: 98.4 F | BODY MASS INDEX: 40.63 KG/M2 | HEART RATE: 68 BPM | DIASTOLIC BLOOD PRESSURE: 80 MMHG

## 2025-05-19 DIAGNOSIS — E66.813 CLASS 3 SEVERE OBESITY DUE TO EXCESS CALORIES WITH SERIOUS COMORBIDITY AND BODY MASS INDEX (BMI) OF 40.0 TO 44.9 IN ADULT (H): ICD-10-CM

## 2025-05-19 DIAGNOSIS — E78.5 HYPERLIPIDEMIA WITH TARGET LDL LESS THAN 130: ICD-10-CM

## 2025-05-19 DIAGNOSIS — F41.9 ANXIETY: ICD-10-CM

## 2025-05-19 DIAGNOSIS — F32.3 MAJOR DEPRESSIVE DISORDER, SINGLE EPISODE, SEVERE WITH PSYCHOTIC FEATURES (H): ICD-10-CM

## 2025-05-19 DIAGNOSIS — Z00.00 ENCOUNTER FOR MEDICARE ANNUAL WELLNESS EXAM: Primary | ICD-10-CM

## 2025-05-19 DIAGNOSIS — I10 HYPERTENSION GOAL BP (BLOOD PRESSURE) < 140/90: ICD-10-CM

## 2025-05-19 DIAGNOSIS — I50.32 CHRONIC HEART FAILURE WITH PRESERVED EJECTION FRACTION (H): ICD-10-CM

## 2025-05-19 PROBLEM — N95.0 POST-MENOPAUSE BLEEDING: Status: RESOLVED | Noted: 2021-02-04 | Resolved: 2025-05-19

## 2025-05-19 PROBLEM — R73.03 PREDIABETES: Status: RESOLVED | Noted: 2025-05-19 | Resolved: 2025-05-19

## 2025-05-19 PROBLEM — R73.03 PREDIABETES: Status: ACTIVE | Noted: 2025-05-19

## 2025-05-19 LAB
EST. AVERAGE GLUCOSE BLD GHB EST-MCNC: 117 MG/DL
HBA1C MFR BLD: 5.7 % (ref 0–5.6)

## 2025-05-19 PROCEDURE — 3079F DIAST BP 80-89 MM HG: CPT | Performed by: FAMILY MEDICINE

## 2025-05-19 PROCEDURE — G0439 PPPS, SUBSEQ VISIT: HCPCS | Performed by: FAMILY MEDICINE

## 2025-05-19 PROCEDURE — G2211 COMPLEX E/M VISIT ADD ON: HCPCS | Performed by: FAMILY MEDICINE

## 2025-05-19 PROCEDURE — 83036 HEMOGLOBIN GLYCOSYLATED A1C: CPT | Mod: GZ | Performed by: FAMILY MEDICINE

## 2025-05-19 PROCEDURE — 99214 OFFICE O/P EST MOD 30 MIN: CPT | Mod: 25 | Performed by: FAMILY MEDICINE

## 2025-05-19 PROCEDURE — 1126F AMNT PAIN NOTED NONE PRSNT: CPT | Performed by: FAMILY MEDICINE

## 2025-05-19 PROCEDURE — 3075F SYST BP GE 130 - 139MM HG: CPT | Performed by: FAMILY MEDICINE

## 2025-05-19 PROCEDURE — 36415 COLL VENOUS BLD VENIPUNCTURE: CPT | Performed by: FAMILY MEDICINE

## 2025-05-19 RX ORDER — ATORVASTATIN CALCIUM 10 MG/1
10 TABLET, FILM COATED ORAL DAILY
Qty: 90 TABLET | Refills: 3 | Status: SHIPPED | OUTPATIENT
Start: 2025-05-19

## 2025-05-19 SDOH — HEALTH STABILITY: PHYSICAL HEALTH: ON AVERAGE, HOW MANY DAYS PER WEEK DO YOU ENGAGE IN MODERATE TO STRENUOUS EXERCISE (LIKE A BRISK WALK)?: 7 DAYS

## 2025-05-19 ASSESSMENT — PATIENT HEALTH QUESTIONNAIRE - PHQ9
10. IF YOU CHECKED OFF ANY PROBLEMS, HOW DIFFICULT HAVE THESE PROBLEMS MADE IT FOR YOU TO DO YOUR WORK, TAKE CARE OF THINGS AT HOME, OR GET ALONG WITH OTHER PEOPLE: NOT DIFFICULT AT ALL
SUM OF ALL RESPONSES TO PHQ QUESTIONS 1-9: 0
SUM OF ALL RESPONSES TO PHQ QUESTIONS 1-9: 0

## 2025-05-19 ASSESSMENT — SOCIAL DETERMINANTS OF HEALTH (SDOH): HOW OFTEN DO YOU GET TOGETHER WITH FRIENDS OR RELATIVES?: ONCE A WEEK

## 2025-05-19 ASSESSMENT — PAIN SCALES - GENERAL: PAINLEVEL_OUTOF10: NO PAIN (0)

## 2025-05-19 NOTE — PROGRESS NOTES
"Preventive Care Visit  [facility]  Colleen Marroquin MD, Family Medicine  May 19, 2025      Assessment & Plan     (Z00.00) Encounter for Medicare annual wellness exam  (primary encounter diagnosis)  Comment: preventive needs reviewed   Plan: Hemoglobin A1c - elevated glucose on previsit labs        see orders in Epic.     (I50.32) Chronic heart failure with preserved ejection fraction (H)  Comment: no symptoms, doing well  Plan: empagliflozin (JARDIANCE) 10 MG TABS tablet,         Basic metabolic panel  (Ca, Cl, CO2, Creat,         Gluc, K, Na, BUN), TSH with free T4 reflex         Refill x 6 months     (I10) Hypertension goal BP (blood pressure) < 140/90  Comment: to goal  Plan: recent refill, due for labs in 6 months    (E78.5) Hyperlipidemia with target LDL less than 130  Comment: to goal  Plan: atorvastatin (LIPITOR) 10 MG tablet        Refill x 1 yr     (F32.3) Major depressive disorder, single episode, severe with psychotic features (H)  (F41.9) Anxiety  Comment: doing well  Plan: sertraline (ZOLOFT) 50 MG tablet        Also on abilify, recent refill   Refill x 6 months for sertraline    (E66.813,  Z68.41) Class 3 severe obesity due to excess calories with serious comorbidity and body mass index (BMI) of 40.0 to 44.9 in adult (H)  Comment: stable  Plan: likely due to abilify, discussed wt loss strategies.        The longitudinal plan of care for the diagnosis(es)/condition(s) as documented were addressed during this visit. Due to the added complexity in care, I will continue to support Lindsay in the subsequent management and with ongoing continuity of care.    BMI  Estimated body mass index is 40.85 kg/m  as calculated from the following:    Height as of this encounter: 1.626 m (5' 4\").    Weight as of this encounter: 108 kg (238 lb).   Weight management plan: Discussed healthy diet and exercise guidelines    Counseling  Appropriate preventive services were addressed with this patient via " screening, questionnaire, or discussion as appropriate for fall prevention, nutrition, physical activity, Tobacco-use cessation, social engagement, weight loss and cognition.  Checklist reviewing preventive services available has been given to the patient.  Reviewed patient's diet, addressing concerns and/or questions.   The patient was instructed to see the dentist every 6 months.   Information on urinary incontinence and treatment options given to patient.           Christoph Montoya is a 70 year old, presenting for the following:  Medicare Visit (Fasting )        5/19/2025     9:44 AM   Additional Questions   Roomed by Sangeetha JAMES   Accompanied by Self        HPI     Advance Care Planning    Discussed advance care planning with patient; informed AVS has link to [employer].        5/19/2025   General Health   How would you rate your overall physical health? Good   Feel stress (tense, anxious, or unable to sleep) Not at all         5/19/2025   Nutrition   Diet: Low salt    Carbohydrate counting       Multiple values from one day are sorted in reverse-chronological order         5/19/2025   Exercise   Days per week of moderate/strenous exercise 7 days         5/19/2025   Social Factors   Frequency of gathering with friends or relatives Once a week   Worry food won't last until get money to buy more No   Food not last or not have enough money for food? No   Do you have housing? (Housing is defined as stable permanent housing and does not include staying outside in a car, in a tent, in an abandoned building, in an overnight shelter, or couch-surfing.) Yes   Are you worried about losing your housing? No   Lack of transportation? No   Unable to get utilities (heat,electricity)? No         5/19/2025   Fall Risk   Fallen 2 or more times in the past year? No   Trouble with walking or balance? Yes   Gait Speed Test Interpretation Greater than 5.01 seconds - ABNORMAL           5/19/2025   Activities of Daily Living-  Home Safety   Needs help with the following daily activites None of the above   Safety concerns in the home None of the above         5/19/2025   Dental   Dentist two times every year? (!) NO         5/19/2025   Hearing Screening   Hearing concerns? None of the above         5/19/2025   Driving Risk Screening   Patient/family members have concerns about driving No         5/19/2025   General Alertness/Fatigue Screening   Have you been more tired than usual lately? No         5/19/2025   Urinary Incontinence Screening   Bothered by leaking urine in past 6 months Yes       Today's PHQ-9 Score:       5/19/2025     9:33 AM   PHQ-9 SCORE   PHQ-9 Total Score MyChart 0   PHQ-9 Total Score 0        Patient-reported         5/19/2025   Substance Use   Alcohol more than 3/day or more than 7/wk Not Applicable   Do you have a current opioid prescription? No   How severe/bad is pain from 1 to 10? 5/10   Do you use any other substances recreationally? No     Social History     Tobacco Use    Smoking status: Never    Smokeless tobacco: Never    Tobacco comments:     no smokers in the [facility]   Vaping Use    Vaping status: Never Used   Substance Use Topics    Alcohol use: Yes     Comment: occasional    Drug use: No           3/14/2024   LAST FHS-7 RESULTS   1st degree relative breast or ovarian cancer Yes   Any relative bilateral breast cancer No   Any male have breast cancer No   Any ONE woman have BOTH breast AND ovarian cancer No   Any woman with breast cancer before 50yrs No   2 or more relatives with breast AND/OR ovarian cancer Yes   2 or more relatives with breast AND/OR bowel cancer No        Mammogram Screening - Mammogram every 1-2 years updated in Health Maintenance based on mutual decision making    G 2 P 2   No LMP recorded. Patient is postmenopausal.     Fasting: No   Td: tdap 1/2019       Flu: 10/2024      Covid: 10/2024      Shingrix: done      PPV: done       RSV: 12/2023               Cholesterol:   Lab Results    Component Value Date    CHOL 167 05/08/2025    CHOL 185 03/15/2021     Lab Results   Component Value Date    HDL 48 05/08/2025    HDL 69 03/15/2021     Lab Results   Component Value Date    LDL 89 05/08/2025    LDL 99 03/15/2021     Lab Results   Component Value Date    TRIG 151 05/08/2025    TRIG 87 03/15/2021     Lab Results   Component Value Date    CHOLHDLRATIO 2.5 09/16/2015         MMG: 3/2024  Dexa:  10/2022 q5y     Flex/colo: 3/2021 q10y scope      Seat Belt: Yes    Sunscreen use: Yes   Calcium Intake: adeq  Health Care Directive: Yes   Sexually Active: No    Current contraception: none  History of abnormal Pap smear: No  Family history of colon/breast/ovarian cancer: Yes: see [facility]  Regular self breast exam: Yes  History of abnormal mammogram: No        ASCVD Risk   The 10-year ASCVD risk score (Abdelrahman MICHELLE, et al., 2019) is: 14.4%    Values used to calculate the score:      Age: 70 years      Sex: Female      Is Non- : No      Diabetic: No      Tobacco smoker: No      Systolic Blood Pressure: 132 mmHg      Is BP treated: Yes      HDL Cholesterol: 48 mg/dL      Total Cholesterol: 167 mg/dL            Reviewed and updated as needed this visit by Provider   Tobacco  Allergies  Meds  Problems  Med Hx  Surg Hx  Fam Hx            Labs reviewed in EPIC  BP Readings from Last 3 Encounters:   05/19/25 132/80   01/13/25 130/68   11/18/24 139/84    Wt Readings from Last 3 Encounters:   05/19/25 108 kg (238 lb)   01/13/25 106.6 kg (235 lb)   11/18/24 102.4 kg (225 lb 12.8 oz)                  Patient Active Problem List   Diagnosis    Hyperlipidemia with target LDL less than 130    Hypertension goal BP (blood pressure) < 140/90    Class 3 severe obesity due to excess calories with serious comorbidity and body mass index (BMI) of 40.0 to 44.9 in adult (H)    Major depressive disorder, single episode, severe with psychotic features (H)    Abnormal uterine bleeding due to endometrial  polyp    Chronic heart failure with preserved ejection fraction (H)    Chronic pain of left knee     Past Surgical History:   Procedure Laterality Date    CHOLECYSTECTOMY      CHOLECYSTECTOMY, LAPOROSCOPIC           OPERATIVE HYSTEROSCOPY WITH MORCELLATOR N/A 03/23/2021    Procedure: Diagnostic Hysteroscopy with biopsy;  Surgeon: Deny Tapia MD;  Location:  OR    OTHER SURGICAL HISTORY      uterine polypectomy     SHOULDER SURGERY Right 03/29/2021    Procedure: CLOSED REDUCTION, SHOULDER;  Surgeon: Melo Mckeon MD;  Location: [facility];  Service: Orthopedics       Social History     Tobacco Use    Smoking status: Never    Smokeless tobacco: Never    Tobacco comments:     no smokers in the [facility]   Substance Use Topics    Alcohol use: Yes     Comment: occasional     Family History   Problem Relation Age of Onset    Breast Cancer Mother     Hypertension Mother     Lipids Mother     Cancer Father         kidney cancer    Diabetes Paternal Grandfather         adult    Breast Cancer Sister     Cancer Sister     Heart Disease Maternal Aunt     Cancer Sister         pancreatic         Current Outpatient Medications   Medication Sig Dispense Refill    ARIPiprazole (ABILIFY) 5 MG tablet TAKE 1 TABLET (5 MG) BY MOUTH EVERY MORNING 90 tablet 1    aspirin 81 MG EC tablet Take 81 mg by mouth daily With a meal       atorvastatin (LIPITOR) 10 MG tablet Take 1 tablet (10 mg) by mouth daily. 90 tablet 3    CALCIUM 600 + D 600-200 MG-UNIT OR TABS 2 pill a day  3    CENTRUM SILVER OR TABS ONE DAILY 0 0    empagliflozin (JARDIANCE) 10 MG TABS tablet Take 1 tablet (10 mg) by mouth daily. 90 tablet 1    furosemide (LASIX) 40 MG tablet Take 1 tablet (40 mg) by mouth daily 90 tablet 1    gabapentin (NEURONTIN) 300 MG capsule Take 1 capsule (300 mg) by mouth 3 times daily 270 capsule 1    latanoprost (XALATAN) 0.005 % ophthalmic solution Instill 1 drop Both Eyes every evening.*      losartan (COZAAR) 50 MG tablet  "Take 1 tablet (50 mg) by mouth daily. 90 tablet 1    sertraline (ZOLOFT) 50 MG tablet Take 2.5 tablets (125 mg) by mouth daily. 225 tablet 1     Current providers sharing in care for this patient include:  Patient Care Team:  Colleen Marroquin MD as PCP - General (Family Medicine)  Colleen Marroquin MD as Assigned PCP  Colleen Marroquin MD as MD (Family Medicine)    The following health maintenance items are reviewed in Epic and correct as of today:  Health Maintenance   Topic Date Due    HF ACTION PLAN  Never done    ANNUAL REVIEW OF HM ORDERS  Never done    Medicare Annual MTM Pharmacist Visit (once per calendar year)  Never done    ALT  03/11/2025    COVID-19 Vaccine (7 - 2024-25 season) 04/12/2025    BMP  11/08/2025    PHQ-9  11/19/2025    CBC  01/13/2026    MAMMO SCREENING  03/14/2026    LIPID  05/08/2026    MEDICARE ANNUAL WELLNESS VISIT  05/19/2026    A1C  05/19/2026    FALL RISK ASSESSMENT  05/19/2026    DEXA  10/11/2027    DIABETES SCREENING  05/19/2028    DTAP/TDAP/TD IMMUNIZATION (4 - Td or Tdap) 01/30/2029    ADVANCE CARE PLANNING  05/19/2030    COLORECTAL CANCER SCREENING  03/08/2031    TSH W/FREE T4 REFLEX  Completed    HEPATITIS C SCREENING  Completed    DEPRESSION ACTION PLAN  Completed    INFLUENZA VACCINE  Completed    Pneumococcal Vaccine: 50+ Years  Completed    ZOSTER IMMUNIZATION  Completed    RSV VACCINE  Completed    HPV IMMUNIZATION  Aged Out    MENINGITIS IMMUNIZATION  Aged Out         Review of Systems  Constitutional, neuro, ENT, endocrine, pulmonary, cardiac, gastrointestinal, genitourinary, musculoskeletal, integument and psychiatric systems are negative, except as otherwise noted.     Objective    Exam  /80   Pulse 68   Temp 98.4  F (36.9  C) (Tympanic)   Resp 16   Ht 1.626 m (5' 4\")   Wt 108 kg (238 lb)   SpO2 96%   BMI 40.85 kg/m     Estimated body mass index is 40.85 kg/m  as calculated from the following:    Height as of this encounter: 1.626 m (5' 4\").    " Weight as of this encounter: 108 kg (238 lb).    Physical Exam  GENERAL: alert and no distress  EYES: Eyes grossly normal to inspection, PERRL and conjunctivae and sclerae normal  HENT: ear canals and TM's normal, nose and mouth without ulcers or lesions  NECK: no adenopathy, no asymmetry, masses, or scars  RESP: lungs clear to auscultation - no rales, rhonchi or wheezes  CV: regular rate and rhythm, normal S1 S2, no S3 or S4, no murmur, click or rub, no peripheral edema  ABDOMEN: soft, nontender, no hepatosplenomegaly, no masses and bowel sounds normal  MS: no gross musculoskeletal defects noted, no edema  SKIN: no suspicious lesions or rashes  NEURO: Normal strength and tone, mentation intact and speech normal  PSYCH: mentation appears normal, affect normal/bright        5/19/2025   Mini Cog   Mini-Cog Not Completed (choose reason) Patient declines   Normal cognition based on my direct observation during interview and exam.     Patient declines, there are NO concerns for cognitive deficits.        Signed Electronically by: Colleen Marroquin MD

## 2025-05-19 NOTE — PATIENT INSTRUCTIONS
Patient Education   Preventive Care Advice   This is general advice given by our system to help you stay healthy. However, your care team may have specific advice just for you. Please talk to your care team about your preventive care needs.  Nutrition  Eat 5 or more servings of fruits and vegetables each day.  Try wheat bread, brown rice and whole grain pasta (instead of white bread, rice, and pasta).  Get enough calcium and vitamin D. Check the label on foods and aim for 100% of the RDA (recommended daily allowance).  Lifestyle  Exercise at least 150 minutes each week  (30 minutes a day, 5 days a week).  Do muscle strengthening activities 2 days a week. These help control your weight and prevent disease.  No smoking.  Wear sunscreen to prevent skin cancer.  Have a dental exam and cleaning every 6 months.  Yearly exams  See your health care team every year to talk about:  Any changes in your health.  Any medicines your care team has prescribed.  Preventive care, family planning, and ways to prevent chronic diseases.  Shots (vaccines)   HPV shots (up to age 26), if you've never had them before.  Hepatitis B shots (up to age 59), if you've never had them before.  COVID-19 shot: Get this shot when it's due.  Flu shot: Get a flu shot every year.  Tetanus shot: Get a tetanus shot every 10 years.  Pneumococcal, hepatitis A, and RSV shots: Ask your care team if you need these based on your risk.  Shingles shot (for age 50 and up)  General health tests  Diabetes screening:  Starting at age 35, Get screened for diabetes at least every 3 years.  If you are younger than age 35, ask your care team if you should be screened for diabetes.  Cholesterol test: At age 39, start having a cholesterol test every 5 years, or more often if advised.  Bone density scan (DEXA): At age 50, ask your care team if you should have this scan for osteoporosis (brittle bones).  Hepatitis C: Get tested at least once in your life.  STIs (sexually  transmitted infections)  Before age 24: Ask your care team if you should be screened for STIs.  After age 24: Get screened for STIs if you're at risk. You are at risk for STIs (including HIV) if:  You are sexually active with more than one person.  You don't use condoms every time.  You or a partner was diagnosed with a sexually transmitted infection.  If you are at risk for HIV, ask about PrEP medicine to prevent HIV.  Get tested for HIV at least once in your life, whether you are at risk for HIV or not.  Cancer screening tests  Cervical cancer screening: If you have a cervix, begin getting regular cervical cancer screening tests starting at age 21.  Breast cancer scan (mammogram): If you've ever had breasts, begin having regular mammograms starting at age 40. This is a scan to check for breast cancer.  Colon cancer screening: It is important to start screening for colon cancer at age 45.  Have a colonoscopy test every 10 years (or more often if you're at risk) Or, ask your provider about stool tests like a FIT test every year or Cologuard test every 3 years.  To learn more about your testing options, visit:   .  For help making a decision, visit:   https://bit.ly/tg34633.  Prostate cancer screening test: If you have a prostate, ask your care team if a prostate cancer screening test (PSA) at age 55 is right for you.  Lung cancer screening: If you are a current or former smoker ages 50 to 80, ask your care team if ongoing lung cancer screenings are right for you.  For informational purposes only. Not to replace the advice of your health care provider. Copyright   2023 [facility]. All rights reserved. Clinically reviewed by the [employer] [facility]. Healios K.K 842972 - REV 01/24.  Preventing Falls: Care Instructions  Injuries and health problems such as trouble walking or poor eyesight can increase your risk of falling. So can some medicines. But there are things you can do to help  "prevent falls. You can exercise to get stronger. You can also arrange your home to make it safer.    Talk to your doctor about the medicines you take. Ask if any of them increase the risk of falls and whether they can be changed or stopped.   Try to exercise regularly. It can help improve your strength and balance. This can help lower your risk of falling.         Practice fall safety and prevention.   Wear low-heeled shoes that fit well and give your feet good support. Talk to your doctor if you have foot problems that make this hard.  Carry a cellphone or wear a medical alert device that you can use to call for help.  Use stepladders instead of chairs to reach high objects. Don't climb if you're at risk for falls. Ask for help, if needed.  Wear the correct eyeglasses, if you need them.        Make your home safer.   Remove rugs, cords, clutter, and furniture from walkways.  Keep your house well lit. Use night-lights in hallways and bathrooms.  Install and use sturdy handrails on stairways.  Wear nonskid footwear, even inside. Don't walk barefoot or in socks without shoes.        Be safe outside.   Use handrails, curb cuts, and ramps whenever possible.  Keep your hands free by using a shoulder bag or backpack.  Try to walk in well-lit areas. Watch out for uneven ground, changes in pavement, and debris.  Be careful in the winter. Walk on the grass or gravel when sidewalks are slippery. Use de-icer on steps and walkways. Add non-slip devices to shoes.    Put grab bars and nonskid mats in your shower or tub and near the toilet. Try to use a shower chair or bath bench when bathing.   Get into a tub or shower by putting in your weaker leg first. Get out with your strong side first. Have a phone or medical alert device in the bathroom with you.   Where can you learn more?  Go to https://www.[employer].net/patiented  Enter G117 in the search box to learn more about \"Preventing Falls: Care Instructions.\"  Current as of: " July 31, 2024  Content Version: 14.4    2000-0927 [employer].   Care instructions adapted under license by your healthcare professional. If you have questions about a medical condition or this instruction, always ask your healthcare professional. [employer] disclaims any warranty or liability for your use of this information.    Bladder Training: Care Instructions  Your Care Instructions     Bladder training is used to treat urge incontinence and stress incontinence. Urge incontinence means that the need to urinate comes on so fast that you can't get to a toilet in time. Stress incontinence means that you leak urine because of pressure on your bladder. For example, it may happen when you laugh, cough, or lift something heavy.  Bladder training can increase how long you can wait before you have to urinate. It can also help your bladder hold more urine. And it can give you better control over the urge to urinate.  It is important to remember that bladder training takes a few weeks to a few months to make a difference. You may not see results right away, but don't give up.  Follow-up care is a key part of your treatment and safety. Be sure to make and go to all appointments, and call your doctor if you are having problems. It's also a good idea to know your test results and keep a list of the medicines you take.  How can you care for yourself at home?  Work with your doctor to come up with a bladder training program that is right for you. You may use one or more of the following methods.  Delayed urination  In the beginning, try to keep from urinating for 5 minutes after you first feel the need to go.  While you wait, take deep, slow breaths to relax. Kegel exercises can also help you delay the need to go to the bathroom.  After some practice, when you can easily wait 5 minutes to urinate, try to wait 10 minutes before you urinate.  Slowly increase the waiting period until you are able to  "control when you have to urinate.  Scheduled urination  Empty your bladder when you first wake up in the morning.  Schedule times throughout the day when you will urinate.  Start by going to the bathroom every hour, even if you don't need to go.  Slowly increase the time between trips to the bathroom.  When you have found a schedule that works well for you, keep doing it.  If you wake up during the night and have to urinate, do it. Apply your schedule to waking hours only.  Kegel exercises  These tighten and strengthen pelvic muscles, which can help you control the flow of urine. (If doing these exercises causes pain, stop doing them and talk with your doctor.) To do Kegel exercises:  Squeeze your muscles as if you were trying not to pass gas. Or squeeze your muscles as if you were stopping the flow of urine. Your belly, legs, and buttocks shouldn't move.  Hold the squeeze for 3 seconds, then relax for 5 to 10 seconds.  Start with 3 seconds, then add 1 second each week until you are able to squeeze for 10 seconds.  Repeat the exercise 10 times a session. Do 3 to 8 sessions a day.  When should you call for help?  Watch closely for changes in your health, and be sure to contact your doctor if:    Your incontinence is getting worse.     You do not get better as expected.   Where can you learn more?  Go to https://www.[employer].net/patiented  Enter V684 in the search box to learn more about \"Bladder Training: Care Instructions.\"  Current as of: April 30, 2024  Content Version: 14.4    9734-2214 [employer].   Care instructions adapted under license by your healthcare professional. If you have questions about a medical condition or this instruction, always ask your healthcare professional. [employer] disclaims any warranty or liability for your use of this information.       "

## 2025-05-30 PROBLEM — M25.562 CHRONIC PAIN OF LEFT KNEE: Status: RESOLVED | Noted: 2025-02-06 | Resolved: 2025-05-30

## 2025-05-30 PROBLEM — G89.29 CHRONIC PAIN OF LEFT KNEE: Status: RESOLVED | Noted: 2025-02-06 | Resolved: 2025-05-30

## 2025-07-04 DIAGNOSIS — I10 HYPERTENSION GOAL BP (BLOOD PRESSURE) < 140/90: ICD-10-CM

## 2025-07-04 DIAGNOSIS — I50.32 CHRONIC HEART FAILURE WITH PRESERVED EJECTION FRACTION (H): ICD-10-CM

## 2025-07-06 RX ORDER — FUROSEMIDE 40 MG/1
40 TABLET ORAL DAILY
Qty: 90 TABLET | Refills: 1 | Status: SHIPPED | OUTPATIENT
Start: 2025-07-06

## 2025-07-10 ENCOUNTER — TELEPHONE (OUTPATIENT)
Dept: PHARMACY | Facility: OTHER | Age: 70
End: 2025-07-10
Payer: COMMERCIAL

## 2025-07-10 NOTE — TELEPHONE ENCOUNTER
[facility] Recruitment: [employer] insurance     Referral outreach attempt #1 on July 10, 2025      Outcome: left voicemail- Call back number [phone]    Bee Metzger - [facility]    [phone]      
[[email or web address]]

## 2025-08-04 ENCOUNTER — TELEPHONE (OUTPATIENT)
Dept: PHARMACY | Facility: CLINIC | Age: 70
End: 2025-08-04
Payer: COMMERCIAL

## 2025-08-21 DIAGNOSIS — I10 HYPERTENSION GOAL BP (BLOOD PRESSURE) < 140/90: ICD-10-CM

## 2025-08-21 RX ORDER — LOSARTAN POTASSIUM 50 MG/1
50 TABLET ORAL DAILY
Qty: 90 TABLET | Refills: 1 | Status: SHIPPED | OUTPATIENT
Start: 2025-08-21

## (undated) DEVICE — DRAPE GYN/UROLOGY FLUID POUCH TUR 29455

## (undated) DEVICE — KIT ENDO FIRST STEP DISINFECTANT 200ML W/POUCH EP-4

## (undated) DEVICE — SUCTION CANISTER DOLPHIN 3000ML

## (undated) DEVICE — TUBING SYS AQUILEX BLUE INFLOW AQL-110 YLW OUTFLOW AQL-111

## (undated) DEVICE — SYR 50ML LL W/O NDL 309653

## (undated) DEVICE — PACK MINOR SBA15MIFSE

## (undated) DEVICE — PREP SKIN SCRUB TRAY 4461A

## (undated) DEVICE — SOL WATER IRRIG 1000ML BOTTLE 07139-09

## (undated) DEVICE — SOL NACL 0.9% INJ 1000ML BAG 07983-09

## (undated) DEVICE — PAD PERI W/WINGS 1580A

## (undated) DEVICE — NDL 19GA 1.5"

## (undated) DEVICE — GLOVE PROTEXIS W/NEU-THERA 7.5  2D73TE75

## (undated) DEVICE — NDL SPINAL 22GA 3.5" QUINCKE 405181

## (undated) DEVICE — SEAL SET MYOSURE ROD LENS SCOPE SINGLE USE 40-902

## (undated) RX ORDER — BUPIVACAINE HYDROCHLORIDE 5 MG/ML
INJECTION, SOLUTION PERINEURAL
Status: DISPENSED
Start: 2021-03-23

## (undated) RX ORDER — BUPIVACAINE HYDROCHLORIDE AND EPINEPHRINE 2.5; 5 MG/ML; UG/ML
INJECTION, SOLUTION INFILTRATION; PERINEURAL
Status: DISPENSED
Start: 2021-03-23

## (undated) RX ORDER — LIDOCAINE HYDROCHLORIDE 20 MG/ML
INJECTION, SOLUTION INFILTRATION; PERINEURAL
Status: DISPENSED
Start: 2021-03-23

## (undated) RX ORDER — FENTANYL CITRATE 50 UG/ML
INJECTION, SOLUTION INTRAMUSCULAR; INTRAVENOUS
Status: DISPENSED
Start: 2021-03-23

## (undated) RX ORDER — EPINEPHRINE 1 MG/ML
INJECTION, SOLUTION INTRAMUSCULAR; SUBCUTANEOUS
Status: DISPENSED
Start: 2021-03-23

## (undated) RX ORDER — ACETAMINOPHEN 325 MG/1
TABLET ORAL
Status: DISPENSED
Start: 2021-03-23

## (undated) RX ORDER — KETOROLAC TROMETHAMINE 30 MG/ML
INJECTION, SOLUTION INTRAMUSCULAR; INTRAVENOUS
Status: DISPENSED
Start: 2021-03-23

## (undated) RX ORDER — PROPOFOL 10 MG/ML
INJECTION, EMULSION INTRAVENOUS
Status: DISPENSED
Start: 2021-03-23

## (undated) RX ORDER — ONDANSETRON 2 MG/ML
INJECTION INTRAMUSCULAR; INTRAVENOUS
Status: DISPENSED
Start: 2021-03-23